# Patient Record
Sex: MALE | Race: BLACK OR AFRICAN AMERICAN | NOT HISPANIC OR LATINO | Employment: UNEMPLOYED | URBAN - METROPOLITAN AREA
[De-identification: names, ages, dates, MRNs, and addresses within clinical notes are randomized per-mention and may not be internally consistent; named-entity substitution may affect disease eponyms.]

---

## 2022-12-06 ENCOUNTER — HOSPITAL ENCOUNTER (EMERGENCY)
Facility: HOSPITAL | Age: 27
Discharge: HOME | End: 2022-12-07
Attending: FAMILY MEDICINE
Payer: MEDICAID

## 2022-12-06 DIAGNOSIS — B34.9 VIRAL ILLNESS: Primary | ICD-10-CM

## 2022-12-06 DIAGNOSIS — K13.79 OTHER LESIONS OF ORAL MUCOSA: ICD-10-CM

## 2022-12-06 DIAGNOSIS — R05.9 COUGH: ICD-10-CM

## 2022-12-06 PROCEDURE — 25000003 PHARM REV CODE 250: Performed by: PHYSICIAN ASSISTANT

## 2022-12-06 PROCEDURE — 0240U COVID/FLU A&B PCR: CPT | Performed by: PHYSICIAN ASSISTANT

## 2022-12-06 PROCEDURE — 99283 EMERGENCY DEPT VISIT LOW MDM: CPT | Mod: 25

## 2022-12-06 RX ORDER — ACETAMINOPHEN 500 MG
1000 TABLET ORAL
Status: COMPLETED | OUTPATIENT
Start: 2022-12-06 | End: 2022-12-06

## 2022-12-06 RX ADMIN — ACETAMINOPHEN 1000 MG: 500 TABLET ORAL at 11:12

## 2022-12-06 NOTE — Clinical Note
Blake Hassan accompanied their child to the emergency department on 12/6/2022. They may return to work on 12/08/2022.      If you have any questions or concerns, please don't hesitate to call.      DAVE Cuba

## 2022-12-07 VITALS
OXYGEN SATURATION: 97 % | SYSTOLIC BLOOD PRESSURE: 123 MMHG | BODY MASS INDEX: 15.81 KG/M2 | WEIGHT: 110.44 LBS | DIASTOLIC BLOOD PRESSURE: 75 MMHG | RESPIRATION RATE: 20 BRPM | HEART RATE: 102 BPM | HEIGHT: 70 IN | TEMPERATURE: 99 F

## 2022-12-07 LAB
FLUAV AG UPPER RESP QL IA.RAPID: NOT DETECTED
FLUBV AG UPPER RESP QL IA.RAPID: NOT DETECTED
SARS-COV-2 RNA RESP QL NAA+PROBE: NOT DETECTED

## 2022-12-07 PROCEDURE — 25000003 PHARM REV CODE 250: Performed by: PHYSICIAN ASSISTANT

## 2022-12-07 RX ORDER — SUCRALFATE 1 G/10ML
1 SUSPENSION ORAL 4 TIMES DAILY
Qty: 560 ML | Refills: 0 | Status: SHIPPED | OUTPATIENT
Start: 2022-12-07 | End: 2022-12-21

## 2022-12-07 RX ORDER — IBUPROFEN 400 MG/1
800 TABLET ORAL
Status: COMPLETED | OUTPATIENT
Start: 2022-12-07 | End: 2022-12-07

## 2022-12-07 RX ADMIN — IBUPROFEN 800 MG: 400 TABLET ORAL at 12:12

## 2022-12-07 NOTE — DISCHARGE INSTRUCTIONS
Take all medications as prescribed.     Use OTC Tylenol or ibuprofen for pain and/or fever.     Use Maalox/Mylanta and liquid benadryl for comfort swallowing.     Follow up with a PCP in 1 week.     Return to ER for any changes or worsening of symptoms.

## 2022-12-07 NOTE — ED PROVIDER NOTES
Encounter Date: 12/6/2022       History     Chief Complaint   Patient presents with    Pleurisy     Chest pain, fever, cough     28 YO AAM in ER with mother at bedside with complaints of cough, sore throat and now he is having pain with eating and drinking X several days. No known sick contacts. No PMHx and takes no meds. Denies chills, SOB, abdominal pain, N/V/D, HA or dizziness. No other complaints.     The history is provided by the patient.   Review of patient's allergies indicates:   Allergen Reactions    Latex, natural rubber      History reviewed. No pertinent past medical history.  History reviewed. No pertinent surgical history.  History reviewed. No pertinent family history.     Review of Systems   Constitutional:  Positive for fever. Negative for chills.   HENT:  Positive for sore throat. Negative for congestion and trouble swallowing (painful).    Respiratory:  Positive for cough. Negative for shortness of breath and wheezing.    Cardiovascular:  Negative for chest pain and leg swelling.   Gastrointestinal:  Negative for abdominal pain, diarrhea, nausea and vomiting.   Musculoskeletal:  Negative for joint swelling.   Skin:  Negative for rash.   Neurological:  Negative for dizziness, weakness and headaches.   All other systems reviewed and are negative.    Physical Exam     Initial Vitals [12/06/22 2249]   BP Pulse Resp Temp SpO2   119/74 (!) 123 20 99.9 °F (37.7 °C) 97 %      MAP       --         Physical Exam    Nursing note and vitals reviewed.  Constitutional: He appears well-developed and well-nourished.   HENT:   Head: Normocephalic and atraumatic.   Nose: Mucosal edema and rhinorrhea present. No epistaxis.   Mouth/Throat: Mucous membranes are normal. Oral lesions present.   Several oral lesions noted to soft palate, bilateral cerumen impactions   Eyes: Conjunctivae are normal.   Neck: Neck supple.   Cardiovascular:  Normal rate and regular rhythm.           Pulmonary/Chest: Breath sounds normal. He  has no wheezes. He has no rhonchi.   Musculoskeletal:         General: Normal range of motion.      Cervical back: Neck supple.     Neurological: He is alert and oriented to person, place, and time.   Skin: Skin is dry.   Psychiatric: He has a normal mood and affect.       ED Course   Procedures  Labs Reviewed   COVID/FLU A&B PCR - Normal    Narrative:     The Xpert Xpress SARS-CoV-2/FLU/RSV plus is a rapid, multiplexed real-time PCR test intended for the simultaneous qualitative detection and differentiation of SARS-CoV-2, Influenza A, Influenza B, and respiratory syncytial virus (RSV) viral RNA in either nasopharyngeal swab or nasal swab specimens.                Imaging Results              X-Ray Chest 1 View (Preliminary result)  Result time 12/06/22 23:46:45      ED Interpretation by DAVE Cuba (12/06/22 23:46:45, [facility], Emergency Medicine)    No acute cardiopulmonary findings                                     Medications   acetaminophen tablet 1,000 mg (1,000 mg Oral Given 12/6/22 2305)   ibuprofen tablet 800 mg (800 mg Oral Given 12/7/22 0038)                 ED Course as of 12/07/22 0139   Wed Dec 07, 2022   0018 VSS, NAD, pt is non-toxic or ill appearing, labs and imaging reviewed with pt, counseled pt on getting tested for HIV at the health unit, he verbalized understanding, will treat at viral illness at this time, treatment plan and discharge instructions including follow up discussed, pt verbalized understanding, all questions answered, pt is stable and ready for discharge  [TT]      ED Course User Index  [TT] DAVE Cuba                 Clinical Impression:   Final diagnoses:  [R05.9] Cough  [B34.9] Viral illness (Primary)  [K13.79] Other lesions of oral mucosa        ED Disposition Condition    Discharge Stable          ED Prescriptions       Medication Sig Dispense Start Date End Date Auth. Provider    sucralfate (CARAFATE) 100 mg/mL suspension Take 10 mLs  (1 g total) by mouth 4 (four) times daily. for 14 days 560 mL 12/7/2022 12/21/2022 DAVE Cuba          Follow-up Information       Follow up With Specialties Details Why Contact Info [facility] Emergency Medicine In 3 days As needed, If symptoms worsen [address] [facility] [phone]    Primary Care Provider  Schedule an appointment as soon as possible for a visit in 5 days  Call a PCP to make an appointment for follow up within 3-5  days.  You can all the phone number on the back of your insurance card for accepting providers as discussed.    [facility]  Go in 1 week for STD testing [address] [place] [address]   [phone]             DAVE Cuba  12/07/22 0139

## 2023-01-18 ENCOUNTER — HOSPITAL ENCOUNTER (INPATIENT)
Facility: HOSPITAL | Age: 28
LOS: 8 days | Discharge: HOME HEALTH | DRG: 975 | End: 2023-01-26
Attending: FAMILY MEDICINE | Admitting: FAMILY MEDICINE
Payer: MEDICAID

## 2023-01-18 DIAGNOSIS — J18.9 PNEUMONIA DUE TO INFECTIOUS ORGANISM, UNSPECIFIED LATERALITY, UNSPECIFIED PART OF LUNG: ICD-10-CM

## 2023-01-18 DIAGNOSIS — B20 SYMPTOMATIC HIV INFECTION: ICD-10-CM

## 2023-01-18 DIAGNOSIS — R07.9 CHEST PAIN: ICD-10-CM

## 2023-01-18 DIAGNOSIS — B20 HIV INFECTION, UNSPECIFIED SYMPTOM STATUS: Primary | ICD-10-CM

## 2023-01-18 DIAGNOSIS — D64.9 NORMOCYTIC ANEMIA: ICD-10-CM

## 2023-01-18 LAB
ABS NEUT CALC (OHS): 0.32 X10(3)/MCL (ref 2.1–9.2)
ALBUMIN SERPL-MCNC: 2.4 G/DL (ref 3.5–5)
ALBUMIN/GLOB SERPL: 0.4 RATIO (ref 1.1–2)
ALP SERPL-CCNC: 308 UNIT/L (ref 40–150)
ALT SERPL-CCNC: 20 UNIT/L (ref 0–55)
AMPHET UR QL SCN: NEGATIVE
ANISOCYTOSIS BLD QL SMEAR: SLIGHT
APPEARANCE UR: ABNORMAL
AST SERPL-CCNC: 28 UNIT/L (ref 5–34)
BACTERIA #/AREA URNS AUTO: ABNORMAL /HPF
BARBITURATE SCN PRESENT UR: NEGATIVE
BASOPHILS # BLD AUTO: 0 X10(3)/MCL (ref 0–0.2)
BASOPHILS NFR BLD AUTO: 0 %
BENZODIAZ UR QL SCN: NEGATIVE
BILIRUB UR QL STRIP.AUTO: ABNORMAL MG/DL
BILIRUBIN DIRECT+TOT PNL SERPL-MCNC: 0.9 MG/DL
BNP BLD-MCNC: 51.8 PG/ML
BUN SERPL-MCNC: 12.7 MG/DL (ref 8.9–20.6)
BURR CELLS (OLG): ABNORMAL
CALCIUM SERPL-MCNC: 8.6 MG/DL (ref 8.4–10.2)
CANNABINOIDS UR QL SCN: POSITIVE
CHLORIDE SERPL-SCNC: 96 MMOL/L (ref 98–107)
CO2 SERPL-SCNC: 21 MMOL/L (ref 22–29)
COCAINE UR QL SCN: NEGATIVE
COLOR UR AUTO: ABNORMAL
CREAT SERPL-MCNC: 1.01 MG/DL (ref 0.73–1.18)
CRYPTOC AG SER QL IA.RAPID: NEGATIVE
CRYPTOC AG TITR CSF IA: NORMAL {TITER}
EOSINOPHIL # BLD AUTO: 0 X10(3)/MCL (ref 0–0.9)
EOSINOPHIL NFR BLD AUTO: 0 %
ERYTHROCYTE [DISTWIDTH] IN BLOOD BY AUTOMATED COUNT: 15.6 % (ref 11.5–17)
ERYTHROCYTE [DISTWIDTH] IN BLOOD BY AUTOMATED COUNT: 15.6 % (ref 11.5–17)
EST. AVERAGE GLUCOSE BLD GHB EST-MCNC: 111.2 MG/DL
FENTANYL UR QL SCN: NEGATIVE
FERRITIN SERPL-MCNC: 2325.58 NG/ML (ref 21.81–274.66)
FLUAV AG UPPER RESP QL IA.RAPID: NOT DETECTED
FLUBV AG UPPER RESP QL IA.RAPID: NOT DETECTED
GFR SERPLBLD CREATININE-BSD FMLA CKD-EPI: >60 MLS/MIN/1.73/M2
GLOBULIN SER-MCNC: 6.3 GM/DL (ref 2.4–3.5)
GLUCOSE SERPL-MCNC: 148 MG/DL (ref 74–100)
GLUCOSE UR QL STRIP.AUTO: NORMAL MG/DL
GRAN CASTS #/AREA URNS LPF: >20 /LPF
HAV IGM SERPL QL IA: NONREACTIVE
HBA1C MFR BLD: 5.5 %
HBV CORE IGM SERPL QL IA: NONREACTIVE
HBV SURFACE AG SERPL QL IA: NONREACTIVE
HCT VFR BLD AUTO: 22.9 % (ref 42–52)
HCT VFR BLD AUTO: 26.2 % (ref 42–52)
HCV AB SERPL QL IA: NONREACTIVE
HGB BLD-MCNC: 7.2 GM/DL (ref 14–18)
HGB BLD-MCNC: 8.3 GM/DL (ref 14–18)
HIV 1+2 AB+HIV1 P24 AG SERPL QL IA: REACTIVE
HYALINE CASTS #/AREA URNS LPF: >20 /LPF
HYPOCHROMIA BLD QL SMEAR: ABNORMAL
IMM GRANULOCYTES # BLD AUTO: 0.59 X10(3)/MCL (ref 0–0.04)
IMM GRANULOCYTES # BLD AUTO: 0.65 X10(3)/MCL (ref 0–0.04)
IMM GRANULOCYTES NFR BLD AUTO: 48.9 %
IMM GRANULOCYTES NFR BLD AUTO: 51.8 %
IRON SATN MFR SERPL: 6 % (ref 20–50)
IRON SERPL-MCNC: 7 UG/DL (ref 65–175)
KETONES UR QL STRIP.AUTO: NEGATIVE MG/DL
LEUKOCYTE ESTERASE UR QL STRIP.AUTO: NEGATIVE UNIT/L
LYMPHOCYTES # BLD AUTO: 0.25 X10(3)/MCL (ref 0.6–4.6)
LYMPHOCYTES NFR BLD AUTO: 18.8 %
LYMPHOCYTES NFR BLD MANUAL: 0.44 X10(3)/MCL
LYMPHOCYTES NFR BLD MANUAL: 40 % (ref 13–40)
MACROCYTES BLD QL SMEAR: SLIGHT
MAGNESIUM SERPL-MCNC: 1.8 MG/DL (ref 1.6–2.6)
MAGNESIUM SERPL-MCNC: 2 MG/DL (ref 1.6–2.6)
MCH RBC QN AUTO: 25.3 PG
MCH RBC QN AUTO: 25.5 PG
MCHC RBC AUTO-ENTMCNC: 31.4 MG/DL (ref 33–36)
MCHC RBC AUTO-ENTMCNC: 31.7 MG/DL (ref 33–36)
MCV RBC AUTO: 80.4 FL (ref 80–94)
MCV RBC AUTO: 80.4 FL (ref 80–94)
MDMA UR QL SCN: NEGATIVE
METAMYELOCYTES NFR BLD MANUAL: 7 %
MONOCYTES # BLD AUTO: 0.34 X10(3)/MCL (ref 0.1–1.3)
MONOCYTES NFR BLD AUTO: 25.6 %
MONOCYTES NFR BLD MANUAL: 0.34 X10(3)/MCL (ref 0.1–1.3)
MONOCYTES NFR BLD MANUAL: 31 % (ref 2–11)
MUCOUS THREADS URNS QL MICRO: ABNORMAL /LPF
MYELOCYTES NFR BLD MANUAL: 6 %
NEUTROPHILS # BLD AUTO: 0.09 X10(3)/MCL (ref 2.1–9.2)
NEUTROPHILS NFR BLD AUTO: 6.7 %
NEUTROPHILS NFR BLD MANUAL: 16 % (ref 47–80)
NITRITE UR QL STRIP.AUTO: NEGATIVE
NRBC BLD AUTO-RTO: 0 %
NRBC BLD AUTO-RTO: 0 %
OPIATES UR QL SCN: NEGATIVE
OSMOLALITY SERPL: 272 MOSM/KG (ref 280–300)
OSMOLALITY UR: 657 MOSM/KG (ref 300–1300)
OVALOCYTES (OLG): ABNORMAL
PCP UR QL: NEGATIVE
PH UR STRIP.AUTO: 6 [PH]
PH UR: 6 [PH] (ref 3–11)
PHOSPHATE SERPL-MCNC: 2.2 MG/DL (ref 2.3–4.7)
PHOSPHATE SERPL-MCNC: 3.2 MG/DL (ref 2.3–4.7)
PLATELET # BLD AUTO: 220 X10(3)/MCL (ref 130–400)
PLATELET # BLD AUTO: 268 X10(3)/MCL (ref 130–400)
PLATELET # BLD EST: NORMAL 10*3/UL
PMV BLD AUTO: 11.8 FL (ref 7.4–10.4)
PMV BLD AUTO: 12.8 FL (ref 7.4–10.4)
POIKILOCYTOSIS BLD QL SMEAR: ABNORMAL
POTASSIUM SERPL-SCNC: 4.6 MMOL/L (ref 3.5–5.1)
PROT SERPL-MCNC: 8.7 GM/DL (ref 6.4–8.3)
PROT UR QL STRIP.AUTO: ABNORMAL MG/DL
RBC # BLD AUTO: 2.85 X10(6)/MCL (ref 4.7–6.1)
RBC # BLD AUTO: 3.26 X10(6)/MCL (ref 4.7–6.1)
RBC #/AREA URNS AUTO: ABNORMAL /HPF
RBC MORPH BLD: ABNORMAL
RBC UR QL AUTO: ABNORMAL UNIT/L
RSV A 5' UTR RNA NPH QL NAA+PROBE: NOT DETECTED
SARS-COV-2 RNA RESP QL NAA+PROBE: NOT DETECTED
SODIUM SERPL-SCNC: 131 MMOL/L (ref 136–145)
SODIUM UR-SCNC: <20 MMOL/L
SP GR UR STRIP.AUTO: 1.03
SPECIFIC GRAVITY, URINE AUTO (.000) (OHS): 1.03 (ref 1–1.03)
SQUAMOUS #/AREA URNS LPF: ABNORMAL /HPF
T PALLIDUM AB SER QL: REACTIVE
TARGETS BLD QL SMEAR: ABNORMAL
TEAR DROP CELL (OLG): ABNORMAL
TIBC SERPL-MCNC: 118 UG/DL (ref 69–240)
TIBC SERPL-MCNC: 125 UG/DL (ref 250–450)
TRANSFERRIN SERPL-MCNC: 109 MG/DL (ref 174–364)
TROPONIN I SERPL-MCNC: <0.01 NG/ML (ref 0–0.04)
TSH SERPL-ACNC: 1.44 UIU/ML (ref 0.35–4.94)
TSH SERPL-ACNC: 1.47 UIU/ML (ref 0.35–4.94)
UROBILINOGEN UR STRIP-ACNC: 4 MG/DL
WBC # SPEC AUTO: 1.1 X10(3)/MCL (ref 4.5–11.5)
WBC # SPEC AUTO: 1.3 X10(3)/MCL (ref 4.5–11.5)
WBC #/AREA URNS AUTO: ABNORMAL /HPF

## 2023-01-18 PROCEDURE — 80053 COMPREHEN METABOLIC PANEL: CPT | Performed by: PHYSICIAN ASSISTANT

## 2023-01-18 PROCEDURE — 81001 URINALYSIS AUTO W/SCOPE: CPT | Performed by: PHYSICIAN ASSISTANT

## 2023-01-18 PROCEDURE — 87184 SC STD DISK METHOD PER PLATE: CPT

## 2023-01-18 PROCEDURE — 63600175 PHARM REV CODE 636 W HCPCS: Performed by: FAMILY MEDICINE

## 2023-01-18 PROCEDURE — 93005 ELECTROCARDIOGRAM TRACING: CPT

## 2023-01-18 PROCEDURE — 86780 TREPONEMA PALLIDUM: CPT

## 2023-01-18 PROCEDURE — 25500020 PHARM REV CODE 255: Performed by: FAMILY MEDICINE

## 2023-01-18 PROCEDURE — 11000001 HC ACUTE MED/SURG PRIVATE ROOM

## 2023-01-18 PROCEDURE — 83735 ASSAY OF MAGNESIUM: CPT

## 2023-01-18 PROCEDURE — 87536 HIV-1 QUANT&REVRSE TRNSCRPJ: CPT

## 2023-01-18 PROCEDURE — 86702 HIV-2 ANTIBODY: CPT | Performed by: PHYSICIAN ASSISTANT

## 2023-01-18 PROCEDURE — 83550 IRON BINDING TEST: CPT

## 2023-01-18 PROCEDURE — 0241U COVID/RSV/FLU A&B PCR: CPT

## 2023-01-18 PROCEDURE — 86920 COMPATIBILITY TEST SPIN: CPT

## 2023-01-18 PROCEDURE — 94761 N-INVAS EAR/PLS OXIMETRY MLT: CPT

## 2023-01-18 PROCEDURE — 86900 BLOOD TYPING SEROLOGIC ABO: CPT

## 2023-01-18 PROCEDURE — 96361 HYDRATE IV INFUSION ADD-ON: CPT

## 2023-01-18 PROCEDURE — 83935 ASSAY OF URINE OSMOLALITY: CPT

## 2023-01-18 PROCEDURE — 85027 COMPLETE CBC AUTOMATED: CPT | Performed by: PHYSICIAN ASSISTANT

## 2023-01-18 PROCEDURE — 63600175 PHARM REV CODE 636 W HCPCS

## 2023-01-18 PROCEDURE — S0039 INJECTION, SULFAMETHOXAZOLE: HCPCS

## 2023-01-18 PROCEDURE — 84443 ASSAY THYROID STIM HORMONE: CPT

## 2023-01-18 PROCEDURE — 25000003 PHARM REV CODE 250: Performed by: FAMILY MEDICINE

## 2023-01-18 PROCEDURE — 25000003 PHARM REV CODE 250: Performed by: PHYSICIAN ASSISTANT

## 2023-01-18 PROCEDURE — 84100 ASSAY OF PHOSPHORUS: CPT

## 2023-01-18 PROCEDURE — 84300 ASSAY OF URINE SODIUM: CPT

## 2023-01-18 PROCEDURE — 87040 BLOOD CULTURE FOR BACTERIA: CPT | Performed by: FAMILY MEDICINE

## 2023-01-18 PROCEDURE — 85060 BLOOD SMEAR INTERPRETATION: CPT | Performed by: INTERNAL MEDICINE

## 2023-01-18 PROCEDURE — 84443 ASSAY THYROID STIM HORMONE: CPT | Performed by: PHYSICIAN ASSISTANT

## 2023-01-18 PROCEDURE — 80074 ACUTE HEPATITIS PANEL: CPT

## 2023-01-18 PROCEDURE — 25000003 PHARM REV CODE 250

## 2023-01-18 PROCEDURE — 87088 URINE BACTERIA CULTURE: CPT | Performed by: PHYSICIAN ASSISTANT

## 2023-01-18 PROCEDURE — 96374 THER/PROPH/DIAG INJ IV PUSH: CPT

## 2023-01-18 PROCEDURE — 80307 DRUG TEST PRSMV CHEM ANLYZR: CPT

## 2023-01-18 PROCEDURE — 86701 HIV-1ANTIBODY: CPT | Performed by: PHYSICIAN ASSISTANT

## 2023-01-18 PROCEDURE — 83930 ASSAY OF BLOOD OSMOLALITY: CPT

## 2023-01-18 PROCEDURE — 85027 COMPLETE CBC AUTOMATED: CPT

## 2023-01-18 PROCEDURE — 87449 NOS EACH ORGANISM AG IA: CPT

## 2023-01-18 PROCEDURE — 84484 ASSAY OF TROPONIN QUANT: CPT | Performed by: PHYSICIAN ASSISTANT

## 2023-01-18 PROCEDURE — 87389 HIV-1 AG W/HIV-1&-2 AB AG IA: CPT | Performed by: PHYSICIAN ASSISTANT

## 2023-01-18 PROCEDURE — 83036 HEMOGLOBIN GLYCOSYLATED A1C: CPT

## 2023-01-18 PROCEDURE — 87899 AGENT NOS ASSAY W/OPTIC: CPT

## 2023-01-18 PROCEDURE — 99285 EMERGENCY DEPT VISIT HI MDM: CPT | Mod: 25

## 2023-01-18 PROCEDURE — 83735 ASSAY OF MAGNESIUM: CPT | Performed by: PHYSICIAN ASSISTANT

## 2023-01-18 PROCEDURE — 86592 SYPHILIS TEST NON-TREP QUAL: CPT

## 2023-01-18 PROCEDURE — 85025 COMPLETE CBC W/AUTO DIFF WBC: CPT | Performed by: PHYSICIAN ASSISTANT

## 2023-01-18 PROCEDURE — 83880 ASSAY OF NATRIURETIC PEPTIDE: CPT | Performed by: PHYSICIAN ASSISTANT

## 2023-01-18 PROCEDURE — 87070 CULTURE OTHR SPECIMN AEROBIC: CPT

## 2023-01-18 PROCEDURE — 36415 COLL VENOUS BLD VENIPUNCTURE: CPT

## 2023-01-18 PROCEDURE — 86713 LEGIONELLA ANTIBODY: CPT

## 2023-01-18 PROCEDURE — 84100 ASSAY OF PHOSPHORUS: CPT | Performed by: PHYSICIAN ASSISTANT

## 2023-01-18 PROCEDURE — 86361 T CELL ABSOLUTE COUNT: CPT

## 2023-01-18 PROCEDURE — 82728 ASSAY OF FERRITIN: CPT

## 2023-01-18 PROCEDURE — 27000207 HC ISOLATION

## 2023-01-18 RX ORDER — ACETAMINOPHEN 500 MG
1000 TABLET ORAL
Status: DISPENSED | OUTPATIENT
Start: 2023-01-18 | End: 2023-01-19

## 2023-01-18 RX ORDER — NYSTATIN 100000 [USP'U]/ML
500000 SUSPENSION ORAL 4 TIMES DAILY
Status: DISCONTINUED | OUTPATIENT
Start: 2023-01-18 | End: 2023-01-20

## 2023-01-18 RX ORDER — SODIUM CHLORIDE 9 MG/ML
1000 INJECTION, SOLUTION INTRAVENOUS
Status: COMPLETED | OUTPATIENT
Start: 2023-01-18 | End: 2023-01-18

## 2023-01-18 RX ORDER — IBUPROFEN 200 MG
24 TABLET ORAL
Status: DISCONTINUED | OUTPATIENT
Start: 2023-01-18 | End: 2023-01-26 | Stop reason: HOSPADM

## 2023-01-18 RX ORDER — SODIUM CHLORIDE, SODIUM LACTATE, POTASSIUM CHLORIDE, CALCIUM CHLORIDE 600; 310; 30; 20 MG/100ML; MG/100ML; MG/100ML; MG/100ML
INJECTION, SOLUTION INTRAVENOUS CONTINUOUS
Status: DISCONTINUED | OUTPATIENT
Start: 2023-01-18 | End: 2023-01-19

## 2023-01-18 RX ORDER — IBUPROFEN 200 MG
16 TABLET ORAL
Status: DISCONTINUED | OUTPATIENT
Start: 2023-01-18 | End: 2023-01-26 | Stop reason: HOSPADM

## 2023-01-18 RX ORDER — SODIUM CHLORIDE 0.9 % (FLUSH) 0.9 %
10 SYRINGE (ML) INJECTION EVERY 12 HOURS PRN
Status: DISCONTINUED | OUTPATIENT
Start: 2023-01-18 | End: 2023-01-26 | Stop reason: HOSPADM

## 2023-01-18 RX ORDER — GLUCAGON 1 MG
1 KIT INJECTION
Status: DISCONTINUED | OUTPATIENT
Start: 2023-01-18 | End: 2023-01-26 | Stop reason: HOSPADM

## 2023-01-18 RX ORDER — ACETAMINOPHEN 500 MG
1000 TABLET ORAL
Status: COMPLETED | OUTPATIENT
Start: 2023-01-18 | End: 2023-01-18

## 2023-01-18 RX ORDER — SULFAMETHOXAZOLE AND TRIMETHOPRIM 800; 160 MG/1; MG/1
2 TABLET ORAL 2 TIMES DAILY
Status: DISCONTINUED | OUTPATIENT
Start: 2023-01-18 | End: 2023-01-18

## 2023-01-18 RX ORDER — ACETAMINOPHEN 325 MG/1
650 TABLET ORAL EVERY 6 HOURS PRN
Status: DISCONTINUED | OUTPATIENT
Start: 2023-01-18 | End: 2023-01-26 | Stop reason: HOSPADM

## 2023-01-18 RX ORDER — NALOXONE HCL 0.4 MG/ML
0.02 VIAL (ML) INJECTION
Status: DISCONTINUED | OUTPATIENT
Start: 2023-01-18 | End: 2023-01-26 | Stop reason: HOSPADM

## 2023-01-18 RX ORDER — ENOXAPARIN SODIUM 100 MG/ML
40 INJECTION SUBCUTANEOUS EVERY 24 HOURS
Status: DISCONTINUED | OUTPATIENT
Start: 2023-01-18 | End: 2023-01-23

## 2023-01-18 RX ADMIN — VANCOMYCIN HYDROCHLORIDE 1250 MG: 1 INJECTION, POWDER, LYOPHILIZED, FOR SOLUTION INTRAVENOUS at 03:01

## 2023-01-18 RX ADMIN — SODIUM CHLORIDE, POTASSIUM CHLORIDE, SODIUM LACTATE AND CALCIUM CHLORIDE: 600; 310; 30; 20 INJECTION, SOLUTION INTRAVENOUS at 06:01

## 2023-01-18 RX ADMIN — SODIUM PHOSPHATE, MONOBASIC, MONOHYDRATE AND SODIUM PHOSPHATE, DIBASIC, ANHYDROUS 20.01 MMOL: 142; 276 INJECTION, SOLUTION INTRAVENOUS at 07:01

## 2023-01-18 RX ADMIN — ACETAMINOPHEN 1000 MG: 500 TABLET ORAL at 12:01

## 2023-01-18 RX ADMIN — SULFAMETHOXAZOLE AND TRIMETHOPRIM 333.44 MG: 80; 16 INJECTION INTRAVENOUS at 05:01

## 2023-01-18 RX ADMIN — SODIUM CHLORIDE 1000 ML: 9 INJECTION, SOLUTION INTRAVENOUS at 02:01

## 2023-01-18 RX ADMIN — PIPERACILLIN AND TAZOBACTAM 4.5 G: 4; .5 INJECTION, POWDER, LYOPHILIZED, FOR SOLUTION INTRAVENOUS; PARENTERAL at 11:01

## 2023-01-18 RX ADMIN — PIPERACILLIN AND TAZOBACTAM 4.5 G: 4; .5 INJECTION, POWDER, LYOPHILIZED, FOR SOLUTION INTRAVENOUS; PARENTERAL at 03:01

## 2023-01-18 RX ADMIN — SODIUM CHLORIDE 1500 ML: 9 INJECTION, SOLUTION INTRAVENOUS at 12:01

## 2023-01-18 RX ADMIN — NYSTATIN 500000 UNITS: 100000 SUSPENSION ORAL at 11:01

## 2023-01-18 RX ADMIN — ENOXAPARIN SODIUM 40 MG: 40 INJECTION SUBCUTANEOUS at 05:01

## 2023-01-18 RX ADMIN — IOPAMIDOL 100 ML: 755 INJECTION, SOLUTION INTRAVENOUS at 02:01

## 2023-01-18 RX ADMIN — NYSTATIN 500000 UNITS: 100000 SUSPENSION ORAL at 05:01

## 2023-01-18 NOTE — ED PROVIDER NOTES
Name: Hanh Hassan   Age: 27 y.o.  Sex: male    Chief complaint:   Chief Complaint   Patient presents with    Fatigue    Weight Loss     Pt reports generalized weakness, fatigue, and weight loss over the past month.       Patient arrived with: Private  History obtained from: Patient    Subjective:   27-year-old male with no previous medical history that presents to emergency department for generalized weakness and fatigue.  Patient said his symptoms have been going on for approximately 1 month, they have not improved which is why he came into the emergency department today.  Complains of generalized weakness and fatigue.  Denies any falls or trauma.  Complains of subjective fevers and sweats.  Has intermittent cough but no current cough.  Denies chest pain or shortness of breath.  Complains of intermittent nausea.  Denies abdominal pain, vomiting, diarrhea, dysuria, hematuria.    History reviewed. No pertinent past medical history.  History reviewed. No pertinent surgical history.  Social History     Socioeconomic History    Marital status: Single   Tobacco Use    Smoking status: Never    Smokeless tobacco: Never   Substance and Sexual Activity    Drug use: Never    Sexual activity: Not Currently   Social History Narrative    ** Merged History Encounter **          Review of patient's allergies indicates:   Allergen Reactions    Latex      Other reaction(s): rash    Latex, natural rubber         Review of Systems   Constitutional:  Positive for malaise/fatigue. Negative for diaphoresis and fever.   HENT:  Negative for congestion and sore throat.    Eyes:  Negative for pain and discharge.   Respiratory:  Negative for cough and shortness of breath.    Cardiovascular:  Negative for chest pain and palpitations.   Gastrointestinal:  Negative for diarrhea and vomiting.   Genitourinary:  Negative for dysuria and hematuria.   Musculoskeletal:  Negative for back pain and myalgias.   Skin:  Negative for itching and rash.    Neurological:  Positive for weakness. Negative for headaches.        Objective:     Vitals:    01/18/23 1412   BP: 112/70   Pulse: (!) 140   Resp: 15   Temp:         Physical Exam  Constitutional:       Appearance: He is underweight. He is not toxic-appearing.   HENT:      Head: Normocephalic and atraumatic.   Cardiovascular:      Rate and Rhythm: Regular rhythm.      Pulses: Normal pulses.   Pulmonary:      Effort: Pulmonary effort is normal.      Breath sounds: Normal breath sounds.   Abdominal:      General: Abdomen is flat. Bowel sounds are normal.      Palpations: Abdomen is soft.   Musculoskeletal:         General: No deformity. Normal range of motion.      Cervical back: Normal range of motion and neck supple.   Skin:     General: Skin is warm and dry.   Neurological:      General: No focal deficit present.      Mental Status: He is alert and oriented to person, place, and time.   Psychiatric:         Mood and Affect: Mood normal.         Behavior: Behavior normal.        Records:  Nursing records and triage records reviewed  Prior records reviewed    Medical decision making:   Presents to emergency department for generalized weakness and fatigue.  Patient is nontoxic appearing.  Febrile 39.3, will receive acetaminophen.  Tachycardic 168.  Normotensive, saturating well on room air.  Physical exam within normal limits.  Patient triggered SIRS criteria, will order lactic acid.  Patient received 30 cc/kg of IV fluids.    ED Course as of 01/18/23 1453   Wed Jan 18, 2023   1218 Nurse dropped the Tylenol by mistake.  Will reorder.    EKG shows sinus tachycardia, no signs of ischemia [RK]   1244 Leukopenia with a glucose of 1.3.  Anemia with hemoglobin 8.3.  No thrombocytopenia.  Patient has mild electrolyte abnormalities but no severe changes.  No AYDEN hyperglycemia.  Alk-phos elevated at 308, otherwise liver enzymes are within normal limits.  Phosphorus 2.2, magnesium normal. [RK]   1250 Troponin negative [RK]    1330 UA shows no obvious signs of urinary tract infection.  Does have occult blood.  With right CVA tenderness will get CT abdomen pelvis for further evaluation. [RK]   1356 My interpretation of chest x-ray shows signs of pneumonia in the right lower lobe, will wait for CT abd/pelvis prior to starting abx [RK]   1453 CT abdomen pelvis shows no acute intra-abdominal process, confirms pneumonia.  Patient will be started on antibiotics.  Will be admitted to the hospital for further management.  Discussed with internal medicine team. [RK]      ED Course User Index  [RK] Delfino Mishra MD          EKG:  ECG Results              EKG 12-lead (Chest Pain) Age >30 (Preliminary result)  Result time 01/18/23 12:18:37      ED Interpretation by Delfino Mishra MD (01/18/23 12:18:37, [facility], Emergency Medicine)    Sinus tachycardia rate of 168, no signs of ST elevation or depression, right axis deviation, normal intervals with a QTC of 484                      In process by Interface, Lab In [facility] (01/18/23 11:41:35)                   Narrative:    Test Reason : R07.9,    Vent. Rate : 168 BPM     Atrial Rate : 166 BPM     P-R Int : 000 ms          QRS Dur : 066 ms      QT Int : 290 ms       P-R-T Axes : 000 090 049 degrees     QTc Int : 484 ms    Supraventricular tachycardia  Rightward axis  T wave abnormality, consider anterolateral ischemia  Abnormal ECG  No previous ECGs available    Referred By:             Confirmed By:                                      Critical Care    Date/Time: 1/18/2023 2:53 PM  Performed by: Delfino Mishra MD  Authorized by: Delfino Mishra MD   Total critical care time (exclusive of procedural time) : 35 minutes  Comments: Upon my evaluation, this patient had a high probability of imminent or life-threatening deterioration due to SIRS, which required my direct attention, intervention, and personal management.    I have personally provided 35  minutes of critical care time exclusive of time spent on separately billed procedures. Time includes review of chart, history and physical exam of the patient, review of laboratory data, review of radiology results, discussion with consultants, and monitoring for potential decompensation. Interventions were performed as documented above.              Diagnosis:  Final diagnoses:  [R07.9] Chest pain  [B20] HIV infection, unspecified symptom status (Primary)  [J18.9] Pneumonia due to infectious organism, unspecified laterality, unspecified part of lung          Delfino Mishra M.D.  Emergency Medicine Physician     (Please note that this chart was completed via voice to text dictation. There may be typographical errors or substitutions that are unintentional, or uncorrected. Every attempt was made to proofread the chart prior to completion. If there are any questions, please contact the physician for final clarification).         Delfino Mishra MD  01/18/23 7417

## 2023-01-18 NOTE — H&P
[facility] Medicine Wards History & Physical Note     Resident Team: [facility] Medicine List 1  Attending Physician: Evelyn Kc MD  Resident: Will   Intern: Hema      Date of Admit: 1/18/2023    Chief Complaint     Fatigue and Weight Loss (Pt reports generalized weakness, fatigue, and weight loss over the past month. )   for 1 month     Subjective:      History of Present Illness:  Hanh Hassan is a 27 y.o. male with no significant medical history who presented to [facility] ED on 1/18/2023  for blood work following recent ED visit in December.  In December, patient was seen in [facility] ED for sore throat, cough, and pain when swallowing solids and liquids.  He was also noted to have several oral lesions on soft palate. At that time, he was treated as viral illness with supportive care and discharged home  from ED with Carafate.  He was also advised to get tested for HIV at the health unit, which he was unable to do so. During this time, he started experiencing weight loss, decreased appetite, nausea, vomiting, fatigue, and night sweats.  Within the last 2 weeks, patient reports fevers, rhinorrhea, and cough productive of white sputum.  Patient denies hemoptysis, chest pain, palpitations, abdominal pain, shortness of breath.  He denies constipation or diarrhea however reports occasional mucus and bright red blood in stools.  He also reports recent bilateral lower extremity weakness requiring assistance ambulating.     His last sexual encounter was in July 2022.  He has multiple sexual partners (men) and he is the receptive partner.  He denies any dysuria, urinary frequency, discharge, lesions, or rash.  He denies tobacco use, current alcohol use, or drug use.  He does not take any daily medications and he does not have a PCP.     In the ED, he is febrile, normotensive, tachycardic (-168), and satting 97% on room air.  Lab significant for leukopenia WBC 1.3, normocytic anemia, hyponatremia, hypophosphatemia,  "elevated ALP, and positive HIV.  Chest x-ray revealed patchy bilateral lung infiltrates, with dense consolidations in right middle lobe.  In the ED, he was given 1.5 L of normal saline, Zosyn, and Vancomycin.  Internal Medicine was consulted for new HIV diagnosis and pneumonia.    Past Medical History:  History reviewed. No pertinent past medical history.    Past Surgical History:  History reviewed. No pertinent surgical history.    Family History:  History reviewed. No pertinent family history.    Social History:  Social History     Tobacco Use    Smoking status: Never    Smokeless tobacco: Never   Substance Use Topics    Drug use: Never       Allergies:  Review of patient's allergies indicates:   Allergen Reactions    Latex      Other reaction(s): rash    Latex, natural rubber        Home Medications:  Prior to Admission medications    Not on File         Review of Systems:  Constitutional: fever, fatigue, weakness  Eye: no vision loss, eye redness, drainage, or pain  ENMT: rhinorrhea sore throat, no ear pain, no sinus pain/congestion  Respiratory: cough, no wheezing, no shortness of breath  Cardiovascular: no chest pain, no palpitations, no edema  Gastrointestinal: nausea, vomiting, no diarrhea. No abdominal pain  Genitourinary: no dysuria, no urinary frequency or urgency, no hematuria  Hema/Lymph: no abnormal bruising or bleeding  Endocrine: no heat or cold intolerance, no excessive thirst or excessive urination  Musculoskeletal: no muscle or joint pain, no joint swelling  Integumentary: no skin rash or abnormal lesion  Neurologic: weakness, no headache, no dizziness           Objective:   Last 24 Hour Vital Signs:  BP  Min: 112/70  Max: 142/98  Temp  Av.3 °F (38.5 °C)  Min: 98.2 °F (36.8 °C)  Max: 103.1 °F (39.5 °C)  Pulse  Av.2  Min: 138  Max: 168  Resp  Av.8  Min: 15  Max: 29  SpO2  Av.8 %  Min: 97 %  Max: 100 %  Height  Av' 6.14" (168 cm)  Min: 5' 6.14" (168 cm)  Max: 5' 6.14" (168 " cm)  Weight  Av kg (110 lb 3.7 oz)  Min: 50 kg (110 lb 3.7 oz)  Max: 50 kg (110 lb 3.7 oz)  Body mass index is 17.72 kg/m².  No intake/output data recorded.    Physical Examination:  General: Patient resting comfortably in bed, ill-appearing, cachectic   Eye: PERRLA, EOMI, clear conjunctiva, eyelids normal  HENT: Head-normocephalic and atraumatic. Oral well defined white plaques located on soft palate.   Neck: full range of motion, no thyromegaly or lymphadenopathy, trachea midline, supple, no palpable thyroid nodules  Respiratory: crackles in bilateral lung bases, without wheezing, rales, rhonchi   Cardiovascular: tachycardic and regular rhythm without murmurs.  No gallops or rubs no JVD.  Capillary refill within normal limits.  Gastrointestinal: soft, non-tender, non-distended with normal bowel sounds, without masses to palpation  Genitourinary: no CVA tenderness to palpation  Musculoskeletal: full range of motion of all extremities/spine without limitation or discomfort  Integumentary: hypopigmented patches located on face/scalp. Diffuse non-scarring hair loss.   Neurologic: no signs of peripheral neurological deficit, motor/sensory function intact  Psychiatric:  alert and oriented, cognitive function intact, cooperative with exam, good eye contact, judgement and insight altered, mood and affect flat.      Laboratory:  Most Recent Data:  CBC:   Lab Results   Component Value Date    WBC 1.3 (LL) 2023    HGB 8.3 (L) 2023    HCT 26.2 (L) 2023     2023    MCV 80.4 2023    RDW 15.6 2023     WBC Differential:   Recent Labs   Lab 23  1216   WBC 1.3*   HGB 8.3*   HCT 26.2*      MCV 80.4     BMP:   Lab Results   Component Value Date     (L) 2023    K 4.6 2023    CO2 21 (L) 2023    BUN 12.7 2023    CREATININE 1.01 2023    CALCIUM 8.6 2023    MG 2.00 2023    PHOS 2.2 (L) 2023     LFTs:   Lab Results   Component  Value Date    ALBUMIN 2.4 (L) 01/18/2023    BILITOT 0.9 01/18/2023    AST 28 01/18/2023    ALKPHOS 308 (H) 01/18/2023    ALT 20 01/18/2023     Coags: No results found for: INR, PROTIME, PTT  FLP: No results found for: CHOL, HDL, LDLCALC, TRIG, CHOLHDL  DM:   Lab Results   Component Value Date    CREATININE 1.01 01/18/2023     Thyroid:   Lab Results   Component Value Date    TSH 1.465 01/18/2023      Anemia: No results found for: IRON, TIBC, FERRITIN, SATURATEDIRO  No results found for: [id number]  No results found for: FOLATE     Cardiac:   Lab Results   Component Value Date    TROPONINI <0.010 01/18/2023    BNP 51.8 01/18/2023     Urinalysis:   Lab Results   Component Value Date    PHUA 6.0 01/18/2023    UROBILINOGEN +4 (A) 01/18/2023    WBCUA 11-20 (A) 01/18/2023       Trended Lab Data:  Recent Labs   Lab 01/18/23  1216   WBC 1.3*   HGB 8.3*   HCT 26.2*      MCV 80.4   RDW 15.6   *   K 4.6   CO2 21*   BUN 12.7   CREATININE 1.01   ALBUMIN 2.4*   BILITOT 0.9   AST 28   ALKPHOS 308*   ALT 20       Trended Cardiac Data:  Recent Labs   Lab 01/18/23  1216   TROPONINI <0.010   BNP 51.8       Microbiology Data:  Microbiology Results (last 7 days)       Procedure Component Value Units Date/Time    Respiratory Culture [[id number]] Collected: 01/18/23 1608    Order Status: Sent Specimen: Sputum, Expectorated Updated: 01/18/23 1627    Blood Culture (site 1) [[id number]]     Order Status: Sent Specimen: Blood     Blood Culture (site 2) [[id number]]     Order Status: Sent Specimen: Blood     Stool Culture [[id number]]     Order Status: Sent Specimen: Stool     Blood Culture [[id number]] Collected: 01/18/23 1425    Order Status: Sent Specimen: Blood from Forearm, Right Updated: 01/18/23 1427    Blood Culture [[id number]] Collected: 01/18/23 1402    Order Status: Sent Specimen: Blood from Arm, Right Updated: 01/18/23 1403    Urine culture [[id number]] Collected: 01/18/23 1232    Order Status: Sent Specimen: Urine Updated:  01/18/23 1314             Other Results:  EKG (my interpretation): sinus tachycardia     Radiology:  Imaging Results              CT Abdomen Pelvis With Contrast (Final result)  Result time 01/18/23 14:34:11      Final result by Amairnai Cruz MD (01/18/23 14:34:11)                   Impression:      1. Findings concerning for multifocal pneumonia in the lung bases.  2. No acute abnormality of the abdomen or pelvis.      Electronically signed by: Amairani Cruz  Date:    01/18/2023  Time:    14:34               Narrative:    EXAMINATION:  CT ABDOMEN PELVIS WITH CONTRAST    CLINICAL HISTORY:  Flank pain, kidney stone suspected;    TECHNIQUE:  CT imaging was performed of the abdomen and pelvis after the administration of intravenous contrast. Dose length product is 157 mGycm. Automatic exposure control, adjustment of mA/kV or iterative reconstruction technique was used to limit radiation dose.    COMPARISON:  None    FINDINGS:  Liver: Normal.    Gallbladder and biliary tree: No calcified gallstones. No intra or extrahepatic biliary ductal dilation.    Pancreas: Normal.    Spleen: Normal.    Adrenals: Normal.    Kidneys and ureters: There is no obstructing urinary calculus.  There is no hydronephrosis.    Bladder: Normal.    Reproductive organs: No pelvic masses.    Stomach/bowel: No evidence of bowel obstruction. The appendix is not clearly identified.  No discernible bowel inflammation.    Lymph nodes: No pathologically enlarged lymph node identified.    Peritoneum: No ascites or free air. No fluid collection.    Vessels: No abdominal aortic aneurysm.    Abdominal wall: Normal.    Lung bases: There are consolidative changes in the right lower and middle lobes with additional scattered bibasilar airspace opacities.    Bones: No acute osseous findings.                                       X-Ray Chest PA And Lateral (Final result)  Result time 01/18/23 13:59:35      Final result by Tom Quezada MD (01/18/23  13:59:35)                   Impression:      Bilateral lungs infiltrates.      Electronically signed by: Tom Quezada  Date:    01/18/2023  Time:    13:59               Narrative:    EXAMINATION:  XR CHEST PA AND LATERAL    CLINICAL HISTORY:  SV;    TECHNIQUE:  Two views    COMPARISON:  December 6, 2022.    FINDINGS:  Cardiopericardial silhouette is within normal limits.  There is dense consolidation which involves the right middle lung lobe.  There are additional patchy infiltrates which involve the right lower lung lobe and to a lesser degree the left lower lung lobe.  No pulmonary edema or pneumothorax.  No fluid accumulation within the pleural spaces.                        ED Interpretation by Delfino Mishra MD (01/18/23 13:56:26, [facility], Emergency Medicine)    Signs of pneumonia in the right lower lobe.  No cardiomegaly or pneumothorax                                         Assessment & Plan:     HIV   Oral candidiasis   - HIV 1/2 Ag/Ab reactive   - Ordered CD4 count, HIV PCR, HIV 1/2 Antibody Diff   - Ordered PJP PCR, fungitell, Syphilis Ab, hepatitis panel  - Started nystatin suspension 100 mg qid   - Will consider opportunistic infection prophylaxis pending CD4 count     Community Acquired PNA   Suspected PJP   Sepsis   - SIRS 3/4: leukopenia, tachycardia, fever with respiratory source   - WBC 1.3   - Chest xray revealed patchy bilateral infiltrates with dense consolidation  in right middle lobe   - Given 1.5 L NS in ED and started on IV Zosyn and Vancomycin   - Will continue broad spectrum antibiotics with Zosyn and Vancomycin   - Will start Bactrim for suspected PJP infection   - Continue IVF with  mL/hr   - Ordered blood cultures   - Ordered respiratory cultures, COVID/FLU PCR   - Ordered PJP PCR, Fungitell      Hyponatremia   Hypophosphatemia   - Sodium 131   - Ordered serum osmolality, urine osmolality, and urine Na   - Stool studies,  Legoinella,Giardia/Cryptosporidium ordered   - Repleted with NaPhos   - Will continue to monitor and replete as necessary     Normocytic anemia   - H/H of 8.3/ 26.2   - Ordered iron studies and ferritin   - Type & screen ordered   - Continue to monitor H/H for further drop and need for intervention    CODE STATUS: FULL   Access: PIV   Antibiotics: zosyn, vancomycin, bactrim   Diet: regular   DVT Prophylaxis: lovenox   GI Prophylaxis:   Fluids:  ml/hr       Disposition: Admitted to IM for pneumonia and new HIV dx. Will continue treatment with IV abx and further work-up          Ronel Garrido MD  [facility] Internal Medicine HO-1

## 2023-01-18 NOTE — MEDICAL/APP STUDENT
[facility] Medicine Wards History & Physical Note     Resident Team: [facility] Medicine List 1  Attending Physician: Delfino Mishra MD  Resident: ***  Intern: ***     Date of Admit: 1/18/2023    Chief Complaint     Fatigue and Weight Loss (Pt reports generalized weakness, fatigue, and weight loss over the past month. )    Subjective:      History of Present Illness:  Hanh Hassan is a 27 y.o. male with no PMHx who presented to the ED on 1/18/2023  with a primary complaint of Fatigue and Weight Loss (Pt reports generalized weakness, fatigue, and weight loss over the past month. )    Pt last evaluated in ED (12/6/22) for oral lesions (tx w/sulfacrate) and counseled to acquire HIV testing. He states he was prompted by family members to come to the ED today for blood-work. He reports subjective fevers (pt did not use thermometer), night sweats, fatigue, weight loss (pt unsure of exact amount), vomiting of liquid (gatorade) NBNB, BL leg weakness (needs family members to help him walk), occasional blood and mucus in toilet bowl after BMs, rhinorrhea, nonproductive cough, and sore throat x1 m.o.    In the ED, patient febrile to 39.3, tachycardic to 140, all other VSS. CBC revealed H/H 8.3/26.2. BNP revealed Phos 2.2, Na 131. EKG showed sinus tachycardia. CXR revealed bilateral lung infiltrates. Pt received zosyn x1, vancomycin x1, tylenol x1, 30 cc/kg IV NS. Admit to floor.           Past Medical History:  History reviewed. No pertinent past medical history.    Past Surgical History:  History reviewed. No pertinent surgical history.    Family History:  History reviewed. No pertinent family history.    Social History:  Social History     Tobacco Use    Smoking status: Never    Smokeless tobacco: Never   Substance Use Topics    Drug use: Never     Sexual Activity: Last encounter (07/2022); multiple male partners    Allergies:  Review of patient's allergies indicates:   Allergen Reactions    Latex      Other reaction(s): rash     "Latex, natural rubber        Home Medications:  Prior to Admission medications    Not on File       Review of Systems:  Constitutional: subjective fever, fatigue  Eye: no vision loss, eye redness, drainage, or pain  ENMT: sore throat, rhinorrhea  Respiratory: cough, shortness of breath, no wheezing  Cardiovascular: no chest pain, no palpitations, no edema  Gastrointestinal: vomiting, no diarrhea, no abdominal pain  Genitourinary: no dysuria, no urinary frequency or urgency, no hematuria  Hema/Lymph: no abnormal bruising or bleeding  Musculoskeletal: no muscle or joint pain, no joint swelling  Integumentary: no skin rash or abnormal lesion  Neurologic: BL leg weakness, no headache, no dizziness, no numbness       Objective:   Last 24 Hour Vital Signs:  BP  Min: 112/70  Max: 142/98  Temp  Av.3 °F (38.5 °C)  Min: 98.2 °F (36.8 °C)  Max: 103.1 °F (39.5 °C)  Pulse  Av.2  Min: 138  Max: 168  Resp  Av.8  Min: 15  Max: 29  SpO2  Av.8 %  Min: 97 %  Max: 100 %  Height  Av' 6.14" (168 cm)  Min: 5' 6.14" (168 cm)  Max: 5' 6.14" (168 cm)  Weight  Av kg (110 lb 3.7 oz)  Min: 50 kg (110 lb 3.7 oz)  Max: 50 kg (110 lb 3.7 oz)  Body mass index is 17.72 kg/m².  No intake/output data recorded.    Physical Examination:  General: underweight, in no acute distress  Eye: clear conjunctiva, eyelids normal  HENT: several white plaques present in mouth, moist mucus membranes  Respiratory: crackles in LLs BL, respirations non-labored  Cardiovascular: fast rate, regular rhythm without murmurs, gallops or rubs  Gastrointestinal: soft, non-tender, non-distended with normal bowel sounds, without masses to palpation  Musculoskeletal: no obvious deformities  Integumentary: no rashes or skin lesions present  Extremities: no LE edema    Laboratory:  Most Recent Data:  CBC:   Lab Results   Component Value Date    WBC 1.3 (LL) 2023    HGB 8.3 (L) 2023    HCT 26.2 (L) 2023     2023    MCV " 80.4 01/18/2023    RDW 15.6 01/18/2023     WBC Differential:   Recent Labs   Lab 01/18/23  1216   WBC 1.3*   HGB 8.3*   HCT 26.2*      MCV 80.4     BMP:   Lab Results   Component Value Date     (L) 01/18/2023    K 4.6 01/18/2023    CO2 21 (L) 01/18/2023    BUN 12.7 01/18/2023    CREATININE 1.01 01/18/2023    CALCIUM 8.6 01/18/2023    MG 2.00 01/18/2023    PHOS 2.2 (L) 01/18/2023     LFTs:   Lab Results   Component Value Date    ALBUMIN 2.4 (L) 01/18/2023    BILITOT 0.9 01/18/2023    AST 28 01/18/2023    ALKPHOS 308 (H) 01/18/2023    ALT 20 01/18/2023     Coags: No results found for: INR, PROTIME, PTT  FLP: No results found for: CHOL, HDL, LDLCALC, TRIG, CHOLHDL  DM:   Lab Results   Component Value Date    CREATININE 1.01 01/18/2023     Thyroid:   Lab Results   Component Value Date    TSH 1.465 01/18/2023     Anemia: No results found for: IRON, TIBC, FERRITIN, [id number], FOLATE  Cardiac:   Lab Results   Component Value Date    TROPONINI <0.010 01/18/2023    BNP 51.8 01/18/2023     Urinalysis:   Lab Results   Component Value Date    PHUA 6.0 01/18/2023    UROBILINOGEN +4 (A) 01/18/2023    WBCUA 11-20 (A) 01/18/2023       Trended Lab Data:  Recent Labs   Lab 01/18/23  1216   WBC 1.3*   HGB 8.3*   HCT 26.2*      MCV 80.4   RDW 15.6   *   K 4.6   CO2 21*   BUN 12.7   CREATININE 1.01   ALBUMIN 2.4*   BILITOT 0.9   AST 28   ALKPHOS 308*   ALT 20       Trended Cardiac Data:  Recent Labs   Lab 01/18/23  1216   TROPONINI <0.010   BNP 51.8       Microbiology Data:   BCx (1/18/23) - Pending   UCx (1/18/23) - Pending    Other Results:  EKG (my interpretation): there are no previous tracings available for comparison, normal sinus rhythm, sinus tachycardia.    Radiology:  Imaging Results              CT Abdomen Pelvis With Contrast (Final result)  Result time 01/18/23 14:34:11      Final result by Amairani Cruz MD (01/18/23 14:34:11)                   Impression:      1. Findings concerning for  multifocal pneumonia in the lung bases.  2. No acute abnormality of the abdomen or pelvis.      Electronically signed by: Amairani Cruz  Date:    01/18/2023  Time:    14:34               Narrative:    EXAMINATION:  CT ABDOMEN PELVIS WITH CONTRAST    CLINICAL HISTORY:  Flank pain, kidney stone suspected;    TECHNIQUE:  CT imaging was performed of the abdomen and pelvis after the administration of intravenous contrast. Dose length product is 157 mGycm. Automatic exposure control, adjustment of mA/kV or iterative reconstruction technique was used to limit radiation dose.    COMPARISON:  None    FINDINGS:  Liver: Normal.    Gallbladder and biliary tree: No calcified gallstones. No intra or extrahepatic biliary ductal dilation.    Pancreas: Normal.    Spleen: Normal.    Adrenals: Normal.    Kidneys and ureters: There is no obstructing urinary calculus.  There is no hydronephrosis.    Bladder: Normal.    Reproductive organs: No pelvic masses.    Stomach/bowel: No evidence of bowel obstruction. The appendix is not clearly identified.  No discernible bowel inflammation.    Lymph nodes: No pathologically enlarged lymph node identified.    Peritoneum: No ascites or free air. No fluid collection.    Vessels: No abdominal aortic aneurysm.    Abdominal wall: Normal.    Lung bases: There are consolidative changes in the right lower and middle lobes with additional scattered bibasilar airspace opacities.    Bones: No acute osseous findings.                                       X-Ray Chest PA And Lateral (Final result)  Result time 01/18/23 13:59:35      Final result by Tom Quezada MD (01/18/23 13:59:35)                   Impression:      Bilateral lungs infiltrates.      Electronically signed by: Tom Quezada  Date:    01/18/2023  Time:    13:59               Narrative:    EXAMINATION:  XR CHEST PA AND LATERAL    CLINICAL HISTORY:  SV;    TECHNIQUE:  Two views    COMPARISON:  December 6,  2022.    FINDINGS:  Cardiopericardial silhouette is within normal limits.  There is dense consolidation which involves the right middle lung lobe.  There are additional patchy infiltrates which involve the right lower lung lobe and to a lesser degree the left lower lung lobe.  No pulmonary edema or pneumothorax.  No fluid accumulation within the pleural spaces.                        ED Interpretation by Delfino Mishra MD (01/18/23 13:56:26, [facility], Emergency Medicine)    Signs of pneumonia in the right lower lobe.  No cardiomegaly or pneumothorax                                     Assessment & Plan:     1) HIV       - HIV 1/2 Ag/Ab - Reactive in ED       - CD4, HIV PCR, HIV - 1/2 Antibody Differentiation pending       - Stool O&P, Syphilis Ab pending    2) Oral thrush       - Scattered white plaques present in oral cavity on physical exam       - Nystatin 4x daily    3) PNA       - CXR revealed bilateral lung infiltrates in ED       - Started on broad spectrum Abx with zosyn and vancomycin in ED       - Will continue IV zosyn 4.5 g TID       - Will continue IV vancomycin 750 mg BID       - Started on IV bactrim TID for presumed PJP       - Respiratory cultures, BCx, Fungitell, P. jirovecii PCR pending       - COVID/Flu PCR ordered    4) Normocytic anemia       - H/H 8.3/26.2 in ED       - Ordered Type & Screen        - Ordered iron studies and ferritin       - Will continue to monitor    5) Hypophosphatemia       - Phos 2.2 in ED       - Ordered IV sodium phosphate x1       - Will continue to monitor and replete as necessary    4) Hyponatremia       - Na 131 in ED       - Legionella Ab pending       - Ordered serum osmolality, urine osmolality, and urine sodium       - Will continue to monitor      CODE STATUS: Full Code  Access: PIV  Antibiotics: IV zosyn 4.5 g TID, IV vancomycin 750 mg BID, IV bactrim TID  Diet: Regular Diet  DVT Prophylaxis: SubQ enoxaparin 40mg QD  GI  Prophylaxis: ***  Fluids: IV  mL/hr      Disposition: Admitted to Internal Medicine for pneumonia and new HIV diagnosis. Will continue treatment with IV antibiotics and further work-up.        Phoenix Hwaung, MS4  [facility] Medical Student

## 2023-01-19 LAB
ABO + RH BLD: NORMAL
ABO + RH BLD: NORMAL
ABS NEUT CALC (OHS): 0.64 X10(3)/MCL (ref 2.1–9.2)
ABS NEUT CALC (OHS): 0.76 X10(3)/MCL (ref 2.1–9.2)
ALBUMIN SERPL-MCNC: 1.6 G/DL (ref 3.5–5)
ALBUMIN/GLOB SERPL: 0.3 RATIO (ref 1.1–2)
ALP SERPL-CCNC: 215 UNIT/L (ref 40–150)
ALT SERPL-CCNC: 13 UNIT/L (ref 0–55)
ANISOCYTOSIS BLD QL SMEAR: ABNORMAL
AST SERPL-CCNC: 21 UNIT/L (ref 5–34)
BILIRUBIN DIRECT+TOT PNL SERPL-MCNC: 0.4 MG/DL
BLD PROD TYP BPU: NORMAL
BLD PROD TYP BPU: NORMAL
BLOOD UNIT EXPIRATION DATE: NORMAL
BLOOD UNIT EXPIRATION DATE: NORMAL
BLOOD UNIT TYPE CODE: 7300
BLOOD UNIT TYPE CODE: 7300
BUN SERPL-MCNC: 8.2 MG/DL (ref 8.9–20.6)
BURR CELLS (OLG): ABNORMAL
CALCIUM SERPL-MCNC: 7.6 MG/DL (ref 8.4–10.2)
CHLORIDE SERPL-SCNC: 100 MMOL/L (ref 98–107)
CHOLEST SERPL-MCNC: 56 MG/DL
CHOLEST/HDLC SERPL: 5 {RATIO} (ref 0–5)
CO2 SERPL-SCNC: 24 MMOL/L (ref 22–29)
CREAT SERPL-MCNC: 0.67 MG/DL (ref 0.73–1.18)
CROSSMATCH INTERPRETATION: NORMAL
CROSSMATCH INTERPRETATION: NORMAL
DISPENSE STATUS: NORMAL
DISPENSE STATUS: NORMAL
EOSINOPHIL NFR BLD MANUAL: 0.04 X10(3)/MCL (ref 0–0.9)
EOSINOPHIL NFR BLD MANUAL: 4 % (ref 0–8)
ERYTHROCYTE [DISTWIDTH] IN BLOOD BY AUTOMATED COUNT: 16.1 % (ref 11.5–17)
ERYTHROCYTE [DISTWIDTH] IN BLOOD BY AUTOMATED COUNT: 16.5 % (ref 11.5–17)
GFR SERPLBLD CREATININE-BSD FMLA CKD-EPI: >60 MLS/MIN/1.73/M2
GLOBULIN SER-MCNC: 5.1 GM/DL (ref 2.4–3.5)
GLUCOSE SERPL-MCNC: 126 MG/DL (ref 74–100)
GROUP & RH: NORMAL
HCT VFR BLD AUTO: 20.7 % (ref 42–52)
HCT VFR BLD AUTO: 32.7 % (ref 42–52)
HDLC SERPL-MCNC: 11 MG/DL (ref 35–60)
HGB BLD-MCNC: 11 GM/DL (ref 14–18)
HGB BLD-MCNC: 6.7 GM/DL (ref 14–18)
HYPOCHROMIA BLD QL SMEAR: SLIGHT
IMM GRANULOCYTES # BLD AUTO: 0.42 X10(3)/MCL (ref 0–0.04)
IMM GRANULOCYTES # BLD AUTO: 0.59 X10(3)/MCL (ref 0–0.04)
IMM GRANULOCYTES NFR BLD AUTO: 20.8 %
IMM GRANULOCYTES NFR BLD AUTO: 56.2 %
INDIRECT COOMBS GEL: NORMAL
LDLC SERPL CALC-MCNC: 31 MG/DL (ref 50–140)
LYMPHOCYTES NFR BLD MANUAL: 0.32 X10(3)/MCL
LYMPHOCYTES NFR BLD MANUAL: 0.68 X10(3)/MCL
LYMPHOCYTES NFR BLD MANUAL: 29 % (ref 13–40)
LYMPHOCYTES NFR BLD MANUAL: 34 % (ref 13–40)
MAGNESIUM SERPL-MCNC: 2 MG/DL (ref 1.6–2.6)
MCH RBC QN AUTO: 25.5 PG
MCH RBC QN AUTO: 26.6 PG
MCHC RBC AUTO-ENTMCNC: 32.4 MG/DL (ref 33–36)
MCHC RBC AUTO-ENTMCNC: 33.6 MG/DL (ref 33–36)
MCV RBC AUTO: 78.7 FL (ref 80–94)
MCV RBC AUTO: 79.2 FL (ref 80–94)
METAMYELOCYTES NFR BLD MANUAL: 7 %
METAMYELOCYTES NFR BLD MANUAL: 8 %
MICROCYTES BLD QL SMEAR: ABNORMAL
MONOCYTES NFR BLD MANUAL: 0.1 X10(3)/MCL (ref 0.1–1.3)
MONOCYTES NFR BLD MANUAL: 0.56 X10(3)/MCL (ref 0.1–1.3)
MONOCYTES NFR BLD MANUAL: 28 % (ref 2–11)
MONOCYTES NFR BLD MANUAL: 9 % (ref 2–11)
NEUTROPHILS NFR BLD MANUAL: 31 % (ref 47–80)
NEUTROPHILS NFR BLD MANUAL: 49 % (ref 47–80)
NRBC BLD AUTO-RTO: 0 %
NRBC BLD AUTO-RTO: 0 %
OVALOCYTES (OLG): ABNORMAL
PHOSPHATE SERPL-MCNC: 3.2 MG/DL (ref 2.3–4.7)
PLATELET # BLD AUTO: 192 X10(3)/MCL (ref 130–400)
PLATELET # BLD AUTO: 199 X10(3)/MCL (ref 130–400)
PLATELET # BLD EST: NORMAL 10*3/UL
PLATELET # BLD EST: NORMAL 10*3/UL
PMV BLD AUTO: 11.7 FL (ref 7.4–10.4)
PMV BLD AUTO: 12 FL (ref 7.4–10.4)
POIKILOCYTOSIS BLD QL SMEAR: ABNORMAL
POLYCHROMASIA BLD QL SMEAR: SLIGHT
POTASSIUM SERPL-SCNC: 3.1 MMOL/L (ref 3.5–5.1)
PROMYELOCYTES # BLD MANUAL: 1 %
PROT SERPL-MCNC: 6.7 GM/DL (ref 6.4–8.3)
RBC # BLD AUTO: 2.63 X10(6)/MCL (ref 4.7–6.1)
RBC # BLD AUTO: 4.13 X10(6)/MCL (ref 4.7–6.1)
RBC MORPH BLD: ABNORMAL
RBC MORPH BLD: NORMAL
RPR SER QL: REACTIVE
RPR SER-TITR: ABNORMAL {TITER}
SODIUM SERPL-SCNC: 133 MMOL/L (ref 136–145)
TARGETS BLD QL SMEAR: SLIGHT
TEAR DROP CELL (OLG): ABNORMAL
TRIGL SERPL-MCNC: 72 MG/DL (ref 34–140)
UNIT NUMBER: NORMAL
UNIT NUMBER: NORMAL
VANCOMYCIN TROUGH SERPL-MCNC: 12.8 UG/ML (ref 15–20)
VLDLC SERPL CALC-MCNC: 14 MG/DL
WBC # SPEC AUTO: 1.1 X10(3)/MCL (ref 4.5–11.5)
WBC # SPEC AUTO: 2 X10(3)/MCL (ref 4.5–11.5)

## 2023-01-19 PROCEDURE — 36415 COLL VENOUS BLD VENIPUNCTURE: CPT

## 2023-01-19 PROCEDURE — S0039 INJECTION, SULFAMETHOXAZOLE: HCPCS

## 2023-01-19 PROCEDURE — 86900 BLOOD TYPING SEROLOGIC ABO: CPT

## 2023-01-19 PROCEDURE — 11000001 HC ACUTE MED/SURG PRIVATE ROOM

## 2023-01-19 PROCEDURE — 85027 COMPLETE CBC AUTOMATED: CPT

## 2023-01-19 PROCEDURE — 25000003 PHARM REV CODE 250

## 2023-01-19 PROCEDURE — 97165 OT EVAL LOW COMPLEX 30 MIN: CPT

## 2023-01-19 PROCEDURE — 25000003 PHARM REV CODE 250: Performed by: FAMILY MEDICINE

## 2023-01-19 PROCEDURE — 80053 COMPREHEN METABOLIC PANEL: CPT

## 2023-01-19 PROCEDURE — 83735 ASSAY OF MAGNESIUM: CPT

## 2023-01-19 PROCEDURE — 63600175 PHARM REV CODE 636 W HCPCS: Performed by: FAMILY MEDICINE

## 2023-01-19 PROCEDURE — 63600175 PHARM REV CODE 636 W HCPCS

## 2023-01-19 PROCEDURE — 84100 ASSAY OF PHOSPHORUS: CPT

## 2023-01-19 PROCEDURE — A4216 STERILE WATER/SALINE, 10 ML: HCPCS

## 2023-01-19 PROCEDURE — 97162 PT EVAL MOD COMPLEX 30 MIN: CPT

## 2023-01-19 PROCEDURE — 27000207 HC ISOLATION

## 2023-01-19 PROCEDURE — 80202 ASSAY OF VANCOMYCIN: CPT | Performed by: FAMILY MEDICINE

## 2023-01-19 PROCEDURE — 36415 COLL VENOUS BLD VENIPUNCTURE: CPT | Performed by: FAMILY MEDICINE

## 2023-01-19 PROCEDURE — 94761 N-INVAS EAR/PLS OXIMETRY MLT: CPT

## 2023-01-19 PROCEDURE — 80061 LIPID PANEL: CPT

## 2023-01-19 PROCEDURE — 36430 TRANSFUSION BLD/BLD COMPNT: CPT

## 2023-01-19 PROCEDURE — 21400001 HC TELEMETRY ROOM

## 2023-01-19 PROCEDURE — 87591 N.GONORRHOEAE DNA AMP PROB: CPT

## 2023-01-19 PROCEDURE — 85007 BL SMEAR W/DIFF WBC COUNT: CPT

## 2023-01-19 PROCEDURE — P9016 RBC LEUKOCYTES REDUCED: HCPCS

## 2023-01-19 RX ORDER — POTASSIUM CHLORIDE 750 MG/1
30 CAPSULE, EXTENDED RELEASE ORAL ONCE
Status: COMPLETED | OUTPATIENT
Start: 2023-01-19 | End: 2023-01-19

## 2023-01-19 RX ORDER — ALUMINUM HYDROXIDE, MAGNESIUM HYDROXIDE, AND SIMETHICONE 2400; 240; 2400 MG/30ML; MG/30ML; MG/30ML
10 SUSPENSION ORAL EVERY 6 HOURS PRN
COMMUNITY
End: 2023-02-23

## 2023-01-19 RX ORDER — ONDANSETRON 4 MG/1
8 TABLET, ORALLY DISINTEGRATING ORAL EVERY 8 HOURS PRN
Status: DISCONTINUED | OUTPATIENT
Start: 2023-01-19 | End: 2023-01-22

## 2023-01-19 RX ORDER — HYDROCODONE BITARTRATE AND ACETAMINOPHEN 500; 5 MG/1; MG/1
TABLET ORAL
Status: DISCONTINUED | OUTPATIENT
Start: 2023-01-19 | End: 2023-01-26 | Stop reason: HOSPADM

## 2023-01-19 RX ADMIN — NYSTATIN 500000 UNITS: 100000 SUSPENSION ORAL at 05:01

## 2023-01-19 RX ADMIN — PIPERACILLIN AND TAZOBACTAM 4.5 G: 4; .5 INJECTION, POWDER, LYOPHILIZED, FOR SOLUTION INTRAVENOUS; PARENTERAL at 04:01

## 2023-01-19 RX ADMIN — POTASSIUM CHLORIDE 30 MEQ: 10 CAPSULE, COATED, EXTENDED RELEASE ORAL at 08:01

## 2023-01-19 RX ADMIN — PIPERACILLIN AND TAZOBACTAM 4.5 G: 4; .5 INJECTION, POWDER, LYOPHILIZED, FOR SOLUTION INTRAVENOUS; PARENTERAL at 11:01

## 2023-01-19 RX ADMIN — ONDANSETRON 8 MG: 4 TABLET, ORALLY DISINTEGRATING ORAL at 05:01

## 2023-01-19 RX ADMIN — SULFAMETHOXAZOLE AND TRIMETHOPRIM 333.44 MG: 80; 16 INJECTION INTRAVENOUS at 02:01

## 2023-01-19 RX ADMIN — VANCOMYCIN HYDROCHLORIDE 750 MG: 750 INJECTION, POWDER, LYOPHILIZED, FOR SOLUTION INTRAVENOUS at 04:01

## 2023-01-19 RX ADMIN — PIPERACILLIN AND TAZOBACTAM 4.5 G: 4; .5 INJECTION, POWDER, LYOPHILIZED, FOR SOLUTION INTRAVENOUS; PARENTERAL at 06:01

## 2023-01-19 RX ADMIN — NYSTATIN 500000 UNITS: 100000 SUSPENSION ORAL at 12:01

## 2023-01-19 RX ADMIN — ONDANSETRON 8 MG: 4 TABLET, ORALLY DISINTEGRATING ORAL at 04:01

## 2023-01-19 RX ADMIN — VANCOMYCIN HYDROCHLORIDE 750 MG: 750 INJECTION, POWDER, LYOPHILIZED, FOR SOLUTION INTRAVENOUS at 05:01

## 2023-01-19 RX ADMIN — SULFAMETHOXAZOLE AND TRIMETHOPRIM 333.44 MG: 80; 16 INJECTION INTRAVENOUS at 10:01

## 2023-01-19 RX ADMIN — SODIUM CHLORIDE, POTASSIUM CHLORIDE, SODIUM LACTATE AND CALCIUM CHLORIDE: 600; 310; 30; 20 INJECTION, SOLUTION INTRAVENOUS at 06:01

## 2023-01-19 RX ADMIN — Medication 10 ML: at 10:01

## 2023-01-19 RX ADMIN — SULFAMETHOXAZOLE AND TRIMETHOPRIM 333.44 MG: 80; 16 INJECTION INTRAVENOUS at 06:01

## 2023-01-19 RX ADMIN — NYSTATIN 500000 UNITS: 100000 SUSPENSION ORAL at 09:01

## 2023-01-19 RX ADMIN — NYSTATIN 500000 UNITS: 100000 SUSPENSION ORAL at 08:01

## 2023-01-19 NOTE — PT/OT/SLP EVAL
Occupational Therapy   Evaluation    Name: Hanh Hassan  MRN: [id number]  Admitting Diagnosis: HIV infection, pneumonia, tachycardia  Recent Surgery: * No surgery found *      Recommendations:     Discharge Recommendations: home, home health PT  Discharge Equipment Recommendations:  none  Barriers to discharge:  None    Assessment:     Hanh Hassan is a 27 y.o. male with a medical diagnosis as noted above.  He presents with functional decline, recent nausea/vomiting limiting appetite. Performance deficits affecting function: weakness, impaired endurance, impaired functional mobility, impaired balance, impaired cognition, decreased coordination.      Rehab Prognosis: Good; patient would benefit from acute skilled OT services to address these deficits and reach maximum level of function.       Plan:     Patient to be seen 2 x/week, 5 x/week (M-F) to address the above listed problems via self-care/home management, therapeutic activities, therapeutic exercises  Plan of Care Expires:    Plan of Care Reviewed with: patient, family    Subjective     Chief Complaint: weakness, nausea/vomiting, deconditioning, weight loss  Patient/Family Comments/goals: regain strength, return home at PLOF (independent)    Occupational Profile:  Living Environment: [facility] with his mother, 1 step to enter  Previous level of function: independent with ADLs and mobility  Roles and Routines: none stated, pt does not drive or work  Equipment Used at Home: none  Assistance upon Discharge: no anticipated need    Pain/Comfort:  Pain Rating 1: 0/10    Patients cultural, spiritual, Jainism conflicts given the current situation: no    Objective:     Communicated with: nurse Carroll prior to session.  Patient found HOB elevated with peripheral IV, pulse ox (continuous), telemetry, blood pressure cuff upon OT entry to room.    General Precautions: Standard, contact, fall, neutropenic  Orthopedic Precautions:    Braces:    Respiratory Status: Room  air  Initial vital signs:  139/75, O2 sat 95%, HR 94  Final vital signs:  119/73, O2 sat 98%,     Occupational Performance:    Bed Mobility:    Patient completed Supine to Sit with contact guard assistance  Patient completed Sit to Supine with contact guard assistance    Functional Mobility/Transfers:  Patient completed Sit <> Stand Transfer with contact guard assistance and minimum assistance  with  hand-held assist   Functional Mobility: CGA sidestepping 6 steps using RW    Activities of Daily Living:  Feeding:  NT d/t pt not eating d/t c/o nausea/vomiting this am, improved at this time but pt has not yet eaten today  Grooming: contact guard assistance in bed with HOB elevated to brush teeth  Bathing: maximal assistance .  Upper Body Dressing: minimum assistance seated EOB  Lower Body Dressing: minimum assistance seated EOB for assist to maintain sitting balance during task  Toileting: minimum assistance using urinal    Cognitive/Visual Perceptual:  Cognitive/Psychosocial Skills:     -       Oriented to: Person, Place, and Situation   -       Follows Commands/attention:Follows one-step commands  -       Safety awareness/insight to disability: intact     Physical Exam:  BUE AROM WFL, strength 3+/5    AMPAC 6 Click ADL:  AMPAC Total Score:      Treatment & Education:  OT goals and POC, use of call bell for assist with all mobility OOB d/t fall risk    Patient left HOB elevated with all lines intact, call button in reach, and pt's aunt present    GOALS:   Multidisciplinary Problems       Occupational Therapy Goals          Problem: Occupational Therapy    Goal Priority Disciplines Outcome Interventions   Occupational Therapy Goal     OT, PT/OT Ongoing, Progressing    Description: Patient will perform feeding with mod I      Patient will perform grooming with mod I while standing at sink    Patient will perform toileting with mod I    Patient will perform toilet transfer with mod I with use of RW or grab  bars    Patient will perform upper body dressing with mod I while seated EOB     Patient will perform lower body dressing with mod I while seated EOB                          History:     History reviewed. No pertinent past medical history.    History reviewed. No pertinent surgical history.    Time Tracking:     OT Date of Treatment: 01/19/23  OT Start Time: 1037  OT Stop Time: 1105  OT Total Time (min): 28 min    Billable Minutes:Evaluation 28    1/19/2023

## 2023-01-19 NOTE — PROGRESS NOTES
Pharmacokinetic Assessment Follow Up: IV Vancomycin    Vancomycin serum concentration assessment(s):    The trough level was drawn correctly and can be used to guide therapy at this time. The measurement is below the desired definitive target range of 15 to 20 mcg/mL.    Vancomycin Regimen Plan:    Continue regimen to Vancomycin 750 mg IV every 12 hours with next serum trough concentration measured at 1500 prior to 3rd dose on 1/20/2023    Drug levels (last 3 results):  Recent Labs   Lab Result Units 01/19/23  1509   Vancomycin Trough ug/ml 12.8*       Pharmacy will continue to follow and monitor vancomycin.    Please contact pharmacy at extension [phone] for questions regarding this assessment.    Thank you for the consult,   Lacy Magallanes       Patient brief summary:  Hanh Hassan is a 27 y.o. male initiated on antimicrobial therapy with IV Vancomycin for treatment of lower respiratory infection    The patient's current regimen is 750 mg IV q12h    Drug Allergies:   Review of patient's allergies indicates:   Allergen Reactions    Latex      Other reaction(s): rash    Latex, natural rubber        Actual Body Weight:   50 kg    Renal Function:   Estimated Creatinine Clearance: 117.1 mL/min (A) (based on SCr of 0.67 mg/dL (L)).,     Dialysis Method (if applicable):  N/A    CBC (last 72 hours):  Recent Labs   Lab Result Units 01/18/23  1216 01/18/23  1643 01/18/23  1804 01/19/23  0251   WBC x10(3)/mcL 1.3*  --  1.1* 1.1*   Hgb gm/dL 8.3*  --  7.2* 6.7*   Hemoglobin A1c %  --  5.5  --   --    Hct % 26.2*  --  22.9* 20.7*   Platelet x10(3)/mcL 268  --  220 192   Mono % % 25.6  --   --   --    Monocyte Man %  --   --  31* 9   Eos % % 0.0  --   --   --    Eos Man %  --   --   --  4   Basophil % % 0.0  --   --   --        Metabolic Panel (last 72 hours):  Recent Labs   Lab Result Units 01/18/23  1216 01/18/23  1232 01/18/23  1643 01/19/23  0251   Sodium Level mmol/L 131*  --   --  133*   Urine Sodium mmol/L  --  <20.0  --   --     Potassium Level mmol/L 4.6  --   --  3.1*   Chloride mmol/L 96*  --   --  100   Carbon Dioxide mmol/L 21*  --   --  24   Glucose Level mg/dL 148*  --   --  126*   Glucose, UA mg/dL  --  Normal  --   --    Blood Urea Nitrogen mg/dL 12.7  --   --  8.2*   Creatinine mg/dL 1.01  --   --  0.67*   Albumin Level g/dL 2.4*  --   --  1.6*   Bilirubin Total mg/dL 0.9  --   --  0.4   Alkaline Phosphatase unit/L 308*  --   --  215*   Aspartate Aminotransferase unit/L 28  --   --  21   Alanine Aminotransferase unit/L 20  --   --  13   Magnesium Level mg/dL 2.00  --  1.80 2.00   Phosphorus Level mg/dL 2.2*  --  3.2 3.2       Vancomycin Administrations:  vancomycin given in the last 96 hours                     vancomycin 750 mg in sodium chloride 0.9% 250 mL IVPB (mg) 750 mg New Bag 01/19/23 0430    vancomycin (VANCOCIN) 1,250 mg in sodium chloride 0.9% 250 mL IVPB (mg) 1,250 mg New Bag 01/18/23 1551                    Microbiologic Results:  Microbiology Results (last 7 days)       Procedure Component Value Units Date/Time    Respiratory Culture [[id number]] Collected: 01/18/23 1608    Order Status: Completed Specimen: Sputum, Expectorated Updated: 01/19/23 0709     Respiratory Culture No Growth At 24 Hours    Urine culture [[id number]] Collected: 01/18/23 1232    Order Status: Completed Specimen: Urine Updated: 01/19/23 0641     Urine Culture No Growth At 24 Hours    Cryptococcal antigen, blood [[id number]] Collected: 01/18/23 1916    Order Status: Completed Specimen: Blood, Venous Updated: 01/18/23 1937     CRYPTOCOCCAL ANTIGEN, SERUM ([facility]) Negative     CRYPTOCOCCAL ANTIGEN TITER ([facility]) --    Blood Culture (site 1) [[id number]]     Order Status: Canceled Specimen: Blood     Blood Culture (site 2) [[id number]]     Order Status: Canceled Specimen: Blood     Stool Culture [[id number]]     Order Status: Sent Specimen: Stool     Blood Culture [[id number]] Collected: 01/18/23 1425    Order Status: Resulted Specimen: Blood from Forearm,  Right Updated: 01/18/23 1427    Blood Culture [[id number]] Collected: 01/18/23 1402    Order Status: Resulted Specimen: Blood from Arm, Right Updated: 01/18/23 4141

## 2023-01-19 NOTE — PLAN OF CARE
Problem: Occupational Therapy  Goal: Occupational Therapy Goal  Description: Patient will perform feeding with mod I      Patient will perform grooming with mod I while standing at sink    Patient will perform toileting with mod I    Patient will perform toilet transfer with mod I with use of RW or grab bars    Patient will perform upper body dressing with mod I while seated EOB     Patient will perform lower body dressing with mod I while seated EOB     Outcome: Ongoing, Progressing

## 2023-01-19 NOTE — PT/OT/SLP EVAL
"Physical Therapy Evaluation    Patient Name:  Hanh Hassan   MRN:  [id number]    Recommendations:     Discharge Recommendations: home with home health   Discharge Equipment Recommendations: walker, rolling   Barriers to discharge:  fall risk, increased comorbidities, increased weakness, decreased ambulation distances, decreased strength    Assessment:     Hanh Hassan is a 27 y.o. male admitted with a medical diagnosis of: HIV infection, enhanced respiratory, unspecified symptoms. He presents with the following impairments/functional limitations: weakness, impaired endurance, impaired self care skills, impaired functional mobility, gait instability, decreased coordination, decreased lower extremity function, impaired cardiopulmonary response to activity. Pt currently receiving blood during evaluation, Pt displayed increased mild tremors in B Ue's and BLE's throughout session. Performed Co-eval with OT. Pt's aunt present during session.     Rehab Prognosis: Fair; patient would benefit from acute skilled PT services to address these deficits and reach maximum level of function.    Recent Surgery: * No surgery found *      Plan:     During this hospitalization, patient to be seen  (3-5 times per week) to address the identified rehab impairments via gait training, therapeutic activities, therapeutic exercises, neuromuscular re-education and progress toward the following goals:    Plan of Care Expires:  02/16/23    Subjective     Chief Complaint: "I am kind of cold."  Patient/Family Comments/goals: "to return home."  Pain/Comfort:  Pain Rating 1: 0/10  Pain Rating Post-Intervention 1: 0/10    Patients cultural, spiritual, Denominational conflicts given the current situation: no    Living Environment:  Pt states that he lives with   Prior to admission, patients level of function was Pt notes that he lives with his mother in single story home with 1 step with no hand rails. Pt notes that he does not drive and does not work " at this time. He states that he was previously independent except for the past 2 weeks he noticed increased weakness in B LE's.  Equipment used at home: none.  DME owned (not currently used): none.  Upon discharge, patient will have assistance from Mother.    Objective:     Communicated with RN prior to session.  Patient found HOB elevated with peripheral IV, PICC line, telemetry, blood pressure cuff, pulse ox (continuous)  upon PT entry to room.    General Precautions: Standard, fall; contact  Orthopedic Precautions:N/A   Braces: N/A  Respiratory Status: Room air    Exams:  Cognitive Exam:  Patient is oriented to Person, Place, Time, and Situation  Gross Motor Coordination:  WFL  Sensation:    -       Intact  light/touch B LE's grossly  RLE ROM: WFL  RLE Strength: Deficits: 4/5 MMT strength grossly; 3+/5 Mmt strength hip flexion  LLE ROM: WFL  LLE Strength: Deficits: 4/5 MMT strength grossly; 3+/5 MMT strength hip flexion     Functional Mobility:  Bed Mobility:     Supine to Sit: contact guard assistance  Sit to Supine: contact guard assistance  Transfers:     Sit to Stand:  contact guard assistance with rolling walker (Sp02 87-93%)  Gait: R and L side steps along bedside with RW at CGA-Min A (mild decreased balance upon standing) decreased R and L hip flexion and decreased HEIDI)  Balance: static standing: CGA (increased postural sway; use of RW (Fair-);   Dynamic standing: CGA-Min A (increased postural sway possibly due to weakness) (Poor + to Fair-)      Treatment & Education:  Pt educated about pursed lips breathing and decreasing valsalva maneuver to increase SP02 during activities.     Patient left HOB elevated with all lines intact, call button in reach, RN notified, and Pt's aunt present.     Vitals   Vitals at Rest  BP  139/75   HR  94   O2 Sat  95%   Pain        Vitals after activity: in long sitting  BP  119/73   HR  117   O2 Sat  98%   Pain           GOALS:   Multidisciplinary Problems       Physical  Therapy Goals          Problem: Physical Therapy    Goal Priority Disciplines Outcome Goal Variances Interventions   Physical Therapy Goal     PT, PT/OT Ongoing, Progressing     Description: Goals to be met by: Discharge     Patient will increase functional independence with mobility by performing:    -. Supine to sit with Modified [place]  -. Sit to supine with Modified [place]  -. Sit to stand transfer with Supervision   -. Gait  x 260 feet with Stand-by Assistance using Rolling Walker.                          History:     History reviewed. No pertinent past medical history.    History reviewed. No pertinent surgical history.    Time Tracking:     PT Received On: 01/19/23  PT Start Time: 1037     PT Stop Time: 1101  PT Total Time (min): 24 min     Billable Minutes: Evaluation 24 01/19/2023

## 2023-01-19 NOTE — MEDICAL/APP STUDENT
[facility] Medicine Wards Progress Note     Resident Team: [facility] Medicine List 1  Attending Physician: Evelyn Kc MD  Resident: ***  Intern: ***       Subjective:      Brief HPI:  Latasha Hassan is a 27 y.o. M pt with no significant PMHx who presented to [facility] ED on 23 for blood-work following recent ED visit in December for sore throat, cough, and pain when swallowing both solids and liquids. He was also noted to have several oral lesions on soft palate and was advised to get tested for HIV at the health unit. Over the last month, patient reports experiencing fevers, rhinorrhea, productive cough, weight loss, decreased appetite, nausea, vomiting, fatigue, and night sweats. He also reports recent bilateral lower extremity weakness requiring assistance ambulating.    In the ED, he was febrile (Tmax 103), normotensive, tachycardic (140-168), and satting 97% on room air. Labs were significant for leukopenia (WBC 1.3), anemia, hyponatremia, hypophosphatemia, elevated ALP, and positive HIV. Chest XR revealed patchy bilateral lung infiltrates with dense consolidations in the right middle lobe. Internal Medicine was consulted for new HIV diagnosis and pneumonia.    Interval History: AFVSS. Patient ate around 7AM and reports feeling nauseated later and vomiting liquid x1, NBNB. Otherwise feeling well.      Review of Systems:  ROS completed and negative except as indicated above.     Objective:     Last 24 Hour Vital Signs:  BP  Min: 100/72  Max: 142/98  Temp  Av.5 °F (37.5 °C)  Min: 97.2 °F (36.2 °C)  Max: 103.1 °F (39.5 °C)  Pulse  Av.2  Min: 63  Max: 144  Resp  Av.7  Min: 15  Max: 29  SpO2  Av %  Min: 96 %  Max: 100 %  I/O last 3 completed shifts:  In: 1487.7 [I.V.:966.7; IV Piggyback:521]  Out: 350 [Urine:350]    Physical Examination:  General: underweight, in no acute distress  Eye: clear conjunctiva, eyelids normal  HENT: well-defined white plaques located on soft palate of oral cavity  Neck:  full range of motion, no thyromegaly or lymphadenopathy  Respiratory: crackles in bilateral lung bases, respirations non-labored  Cardiovascular: regular rate and rhythm without murmurs, gallops or rubs  Gastrointestinal: soft, non-tender, non-distended with normal bowel sounds, without masses to palpation  Musculoskeletal: no obvious deformities, delayed movement of extremities  Integumentary: no rashes or skin lesions present  Extremities: no LE edema  Neurologic: no signs of peripheral neurological deficit    Laboratory:  Most Recent Data:  CBC:   Lab Results   Component Value Date    WBC 1.1 (LL) 01/19/2023    HGB 6.7 (L) 01/19/2023    HCT 20.7 (L) 01/19/2023     01/19/2023    MCV 78.7 (L) 01/19/2023    RDW 16.1 01/19/2023     WBC Differential:   Recent Labs   Lab 01/18/23  1216 01/18/23  1804 01/19/23  0251   WBC 1.3* 1.1* 1.1*   HGB 8.3* 7.2* 6.7*   HCT 26.2* 22.9* 20.7*    220 192   MCV 80.4 80.4 78.7*     BMP:   Lab Results   Component Value Date     (L) 01/19/2023    K 3.1 (L) 01/19/2023    CO2 24 01/19/2023    BUN 8.2 (L) 01/19/2023    CREATININE 0.67 (L) 01/19/2023    CALCIUM 7.6 (L) 01/19/2023    MG 2.00 01/19/2023    PHOS 3.2 01/19/2023     LFTs:   Lab Results   Component Value Date    ALBUMIN 1.6 (L) 01/19/2023    BILITOT 0.4 01/19/2023    AST 21 01/19/2023    ALKPHOS 215 (H) 01/19/2023    ALT 13 01/19/2023     Coags: No results found for: INR, PROTIME, PTT  FLP:   Lab Results   Component Value Date    CHOL 56 01/19/2023    HDL 11 (L) 01/19/2023    TRIG 72 01/19/2023     DM:   Lab Results   Component Value Date    HGBA1C 5.5 01/18/2023    CREATININE 0.67 (L) 01/19/2023     Thyroid:   Lab Results   Component Value Date    TSH 1.435 01/18/2023     Anemia:   Lab Results   Component Value Date    IRON 7 (L) 01/18/2023    TIBC 125 (L) 01/18/2023    FERRITIN 2,325.58 (H) 01/18/2023     Cardiac:   Lab Results   Component Value Date    TROPONINI <0.010 01/18/2023    BNP 51.8 01/18/2023        Microbiology Data Reviewed: yes  Pertinent Findings:  1/18/23 Cryptococcal antigen, serum - Negative  1/18/23 Respiratory Culture - No growth at 24 hrs  1/18/23 Stool Culture - Needs to be collected  1/18/23 Blood Culture x2 - Pending  1/18/23 Urine Culture - No growth at 24 hrs    Other Results:  Radiology:  Imaging Results              CT Abdomen Pelvis With Contrast (Final result)  Result time 01/18/23 14:34:11      Final result by Amairani Cruz MD (01/18/23 14:34:11)                   Impression:      1. Findings concerning for multifocal pneumonia in the lung bases.  2. No acute abnormality of the abdomen or pelvis.      Electronically signed by: Amairani Cruz  Date:    01/18/2023  Time:    14:34               Narrative:    EXAMINATION:  CT ABDOMEN PELVIS WITH CONTRAST    CLINICAL HISTORY:  Flank pain, kidney stone suspected;    TECHNIQUE:  CT imaging was performed of the abdomen and pelvis after the administration of intravenous contrast. Dose length product is 157 mGycm. Automatic exposure control, adjustment of mA/kV or iterative reconstruction technique was used to limit radiation dose.    COMPARISON:  None    FINDINGS:  Liver: Normal.    Gallbladder and biliary tree: No calcified gallstones. No intra or extrahepatic biliary ductal dilation.    Pancreas: Normal.    Spleen: Normal.    Adrenals: Normal.    Kidneys and ureters: There is no obstructing urinary calculus.  There is no hydronephrosis.    Bladder: Normal.    Reproductive organs: No pelvic masses.    Stomach/bowel: No evidence of bowel obstruction. The appendix is not clearly identified.  No discernible bowel inflammation.    Lymph nodes: No pathologically enlarged lymph node identified.    Peritoneum: No ascites or free air. No fluid collection.    Vessels: No abdominal aortic aneurysm.    Abdominal wall: Normal.    Lung bases: There are consolidative changes in the right lower and middle lobes with additional scattered bibasilar  airspace opacities.    Bones: No acute osseous findings.                                       X-Ray Chest PA And Lateral (Final result)  Result time 01/18/23 13:59:35      Final result by Tom Quezada MD (01/18/23 13:59:35)                   Impression:      Bilateral lungs infiltrates.      Electronically signed by: Tom Quezada  Date:    01/18/2023  Time:    13:59               Narrative:    EXAMINATION:  XR CHEST PA AND LATERAL    CLINICAL HISTORY:  SV;    TECHNIQUE:  Two views    COMPARISON:  December 6, 2022.    FINDINGS:  Cardiopericardial silhouette is within normal limits.  There is dense consolidation which involves the right middle lung lobe.  There are additional patchy infiltrates which involve the right lower lung lobe and to a lesser degree the left lower lung lobe.  No pulmonary edema or pneumothorax.  No fluid accumulation within the pleural spaces.                        ED Interpretation by Delfino Mishra MD (01/18/23 13:56:26, [facility], Emergency Medicine)    Signs of pneumonia in the right lower lobe.  No cardiomegaly or pneumothorax                                    Current Medications:     Infusions: None         Scheduled:   enoxaparin  40 mg Subcutaneous Daily    nystatin  500,000 Units Oral QID    piperacillin-tazobactam (ZOSYN) IVPB  4.5 g Intravenous Q8H    trimethoprim-sulfamethoxasole (BACTRIM) IVPB (for non fluid restricted pts) (fixed ratio)  20 mg/kg/day Intravenous Q8H    vancomycin (VANCOCIN) IVPB  750 mg Intravenous Q12H        PRN:  sodium chloride, acetaminophen, dextrose 10%, dextrose 10%, glucagon (human recombinant), glucose, glucose, naloxone, ondansetron, sodium chloride 0.9%, Pharmacy to dose Vancomycin consult **AND** vancomycin - pharmacy to dose    Antibiotics and Day Number of Therapy:  IV zosyn 4.5 g TID - Day 2  IV vancomycin 750 mg BID - Day 2  IV bactrim 20 mg/kg/d TID - Day 2  Nystatin suspension QID - Day 2    Lines and Day  Number of Therapy:  None    Assessment & Plan:     1) HIV      Oral candidiasis       - 1/18/23 - HIV 1/2 Ag/Ab reactive       - 1/19/23 - Hepatitis panel negative       - CD4 count, HIV PCR, HIV 1/2 Ab Diff pending       - Continue nystatin suspension 100mg QID    2) CAP      Sepsis       - SIRS 3/4: leukopenia, tachycardia, fever with respiratory source       - 1/19/23 - WBC 1.1; 1/18/23 - WBC 1.3       - 1/18/23 - CXR revealed patchy bilateral infiltrates with dense consolidation in right middle lobe       - 1/19/23 - COVID/RSV/Flu A&B PCR negative       - PJP PCR, fungitell pending       - BCx x2, Respiratory Cx pending       - Continue IV bactrim for suspected PJP infection       - Continue IV zosyn       - Continue IV vancomycin       - Continue IVF with  mL/hr         3) Syphilis       - 1/19/23 - Syphilis Ab reactive       - 1/19/23 - RPR reactive; 1/19/23 RPR Titer - 64 dils; no previous RPR titer in chart       - F/u health clinic    4)  Microcytic anemia       AoCD       - 1/19/23 H/H - 6.7/20.7; 1/18/23 H/H - 8.3/26.2       - 1/18/23 Ferritin - 2325.58, 1/18/23 Iron - 7, 1/18/23 Transferrin - 109, 1/18/23 TIBC - 125, 1/18/23 - Iron Saturation - 6       - 1/19/23 Type & Screen - B Pos       - Transfused RBC 2U       - Ordered Occult Blood Stool, CA Screen       - Ordered Occult Blood, Stool Screening (1-3)       - Continue to monitor H/H    5)  Hyponatremia       Hypokalemia       - 1/19/23 Na - 133; 1/18/23 Na - 131       - 1/19/23 K - 3.1       - 1/18/23 Serum osmolality - 272, Urine osmolality - 657, Urine Na - <20       - 1/19/23 Phos - 3.2 normalized from 2.2 (1/18/23)       - Stool O&P, giardia/cryptosporidium antigen pending       - Continue to monitor and replete as necessary      CODE STATUS: Full Code  Access: PIV  Antibiotics: zosyn, vancomycin, bactrim  Diet: Neutropenic diet  DVT Prophylaxis: lovenox  GI Prophylaxis: N/A  Fluids:  mL/hr      Disposition: Admitted to IM for  pneumonia and new HIV dx. Will continue tx with IV abx and further work-up.    Phoenix Hwaung, MS4  [facility] [employer]

## 2023-01-19 NOTE — CONSULTS
Inpatient Nutrition Assessment    Admit Date: 1/18/2023   Total duration of encounter: 1 day     Nutrition Recommendation/Prescription     Continue Neutropenic diet (encourage oral intake)  Will send chocolate boost plus tid--360kcal; 14 gm protein per serving  Suggest megace to stimulate appetite  MVI/fe  Biweekly wt  If pt remains with poor po intake--? Need supplemental PPN to help meet nutrient needs: Clinimix 4.25/5 @ 70ml/hr with lipids 20% 250 ml every other day to provide 821 calories, 71 gm protein.     Communication of Recommendations: reviewed with patient/caregiver and reviewed with nurse    Nutrition Assessment     Malnutrition Assessment/Nutrition-Focused Physical Exam    Malnutrition in the context of acute illness or injury  Degree of Malnutrition: severe malnutrition  Energy Intake: </= 50% of estimated energy requirement for >/= 5 days  Interpretation of Weight Loss: >7.5% in 3 months  Body Fat:moderate depletion  Area of Body Fat Loss: orbital region  and upper arm region - triceps / biceps  Muscle Mass Loss: moderate depletion  Area of Muscle Mass Loss: temple region - temporalis muscle, clavicle bone region - pectoralis major, deltoid, trapezius muscles, clavicle and acromion bone region - deltoid muscle, and scapular bone region - trapezius, supraspinus, infraspinus muscles  Fluid Accumulation: does not meet criteria  Edema: no edema present   Reduced  Strength: unable to obtain  A minimum of two characteristics is recommended for diagnosis of either severe or non-severe malnutrition.    Chart Review    Reason Seen: continuous nutrition monitoring and physician consult for eval/treat     Malnutrition Screening Tool Results                Diagnosis:  HIV, oral candidiasis, PNA, suspected PJP, sepsis, hyponatremia, hypophosphatemia, anemia     Relevant Medical History: none     Nutrition-Related Medications: IVF LR @ 100ml/hr, nystatin, zosyn, bactrim, vancomycin   Calorie Containing IV  "Medications: no significant kcals from medications at this time    Nutrition-Related Labs:  (1-19) H/H 6.7/20.7(L) Gluc 126(H) Bun 8.2 Cr 0.6 Alk Phos 215(H) K 3.1(L)     Diet/PN Order: Diet Neutropenic  Oral Supplement Order: none  Tube Feeding Order: none  Appetite/Oral Intake: poor/25-50% of meals  Factors Affecting Nutritional Intake: decreased appetite, nausea, sore mouth, and vomiting  Food/[employer]/Cultural Preferences:  chocolate boost   Food Allergies: none reported    Skin Integrity: abrasion  Wound(s):   as above     Comments    Received  consult for eval; pt reported has not been eating well approx month; had mouth/throat ulcers; currently on nystatin for candidiasis; pt with + reported wt loss--approx 35# over couple months; pt with + severe muscle/fat wasting; new Dx HIV. Will order oral supplement; also suggest megace to stimulate appetite; PPN recs included if pt unable to tolerate oral diet.     Anthropometrics    Height: 5' 6.14" (168 cm) Height Method: Stated  Last Weight: 50 kg (110 lb 3.7 oz) (23) Weight Method: Standard Scale  BMI (Calculated): 17.7  BMI Classification: underweight (BMI less than 18.5)        Ideal Body Weight (IBW), Male: 142.84 lb     % Ideal Body Weight, Male (lb): 77.17 %                 Usual Body Weight (UBW), k.9 kg (pt reported UBW 65.9kg; + wt loss --unsure exact timeframe--? couple months)  % Usual Body Weight: 76.03     Usual Weight Provided By: patient and EMR weight history    Wt Readings from Last 5 Encounters:   23 50 kg (110 lb 3.7 oz)   22 50.1 kg (110 lb 7.2 oz)     Weight Change(s) Since Admission:  Admit Weight: 50 kg (110 lb 3.7 oz) (23 112)  Pt stated #     Estimated Needs    Weight Used For Calorie Calculations: 50 kg (110 lb 3.7 oz)  Energy Calorie Requirements (kcal): 1750 kcal/d; 35 arpit/kg wt gain  Energy Need Method: Kcal/kg  Weight Used For Protein Calculations: 50 kg (110 lb 3.7 oz)  Protein Requirements: " 70 gm protein/d; 1.4 gm/kg  Fluid Requirements (mL): 1750ml/d; 1ml/arpit  Temp: 97.7 °F (36.5 °C)       Enteral Nutrition    Patient not receiving enteral nutrition at this time.    Parenteral Nutrition    Patient not receiving parenteral nutrition support at this time.    Evaluation of Received Nutrient Intake    Calories: not meeting estimated needs  Protein: not meeting estimated needs    Patient Education    Not applicable.    Nutrition Diagnosis     PES: Malnutrition related to new Dx HIV  as evidenced by eating < 50% meals several weeks; 45# reported wt loss; + severe muscle/fat wasting; BMI 17. . (new)    Interventions/Goals     Intervention(s): general/healthful diet, commercial beverage, multivitamin/mineral supplement therapy, prescription medication, and collaboration with other providers  Goal: Meet greater than 75% of nutritional needs by follow-up. (new)    Monitoring & Evaluation     Dietitian will monitor food and beverage intake, weight, and glucose/endocrine profile.  Nutrition Risk/Follow-Up: high (follow-up in 1-4 days)   Please consult if re-assessment needed sooner.

## 2023-01-20 LAB
AGE: 27
ALBUMIN SERPL-MCNC: 1.7 G/DL (ref 3.5–5)
ALBUMIN/GLOB SERPL: 0.3 RATIO (ref 1.1–2)
ALP SERPL-CCNC: 208 UNIT/L (ref 40–150)
ALT SERPL-CCNC: 13 UNIT/L (ref 0–55)
AST SERPL-CCNC: 20 UNIT/L (ref 5–34)
BACTERIA UR CULT: NORMAL
BASOPHILS # BLD AUTO: 0.01 X10(3)/MCL (ref 0–0.2)
BASOPHILS NFR BLD AUTO: 0.7 %
BILIRUBIN DIRECT+TOT PNL SERPL-MCNC: 0.3 MG/DL
BUN SERPL-MCNC: 6 MG/DL (ref 8.9–20.6)
C TRACH DNA SPEC QL NAA+PROBE: NOT DETECTED
CALCIUM SERPL-MCNC: 8 MG/DL (ref 8.4–10.2)
CD3+CD4+ CELLS # BLD: 29 % (ref 28–48)
CD3+CD4+ CELLS # SPEC: 7.97 UNIT/L (ref 589–1505)
CD3+CD4+ CELLS NFR BLD: 2.5 %
CHLORIDE SERPL-SCNC: 99 MMOL/L (ref 98–107)
CO2 SERPL-SCNC: 22 MMOL/L (ref 22–29)
CREAT SERPL-MCNC: 0.79 MG/DL (ref 0.73–1.18)
EOSINOPHIL # BLD AUTO: 0 X10(3)/MCL (ref 0–0.9)
EOSINOPHIL NFR BLD AUTO: 0 %
ERYTHROCYTE [DISTWIDTH] IN BLOOD BY AUTOMATED COUNT: 16.2 % (ref 11.5–17)
GFR SERPLBLD CREATININE-BSD FMLA CKD-EPI: >60 MLS/MIN/1.73/M2
GLOBULIN SER-MCNC: 5.3 GM/DL (ref 2.4–3.5)
GLUCOSE SERPL-MCNC: 124 MG/DL (ref 74–100)
HCT VFR BLD AUTO: 31.8 % (ref 42–52)
HGB BLD-MCNC: 10.6 GM/DL (ref 14–18)
HIV 2 AB SERPLBLD QL IA.RAPID: NEGATIVE
HIV1 AB SERPLBLD QL IA.RAPID: POSITIVE
HIV1 RNA # PLAS NAA DL=20: ABNORMAL COPIES/ML
IMM GRANULOCYTES # BLD AUTO: 0.02 X10(3)/MCL (ref 0–0.04)
IMM GRANULOCYTES NFR BLD AUTO: 1.3 %
LYMPHOCYTES # BLD AUTO: 0.22 X10(3)/MCL (ref 0.6–4.6)
LYMPHOCYTES # BLD AUTO: 319 X10(3)/MCL (ref 1260–5520)
LYMPHOCYTES NFR BLD AUTO: 14.6 %
LYMPHOMA - T-CELL MARKERS SPEC-IMP: ABNORMAL
MAGNESIUM SERPL-MCNC: 2 MG/DL (ref 1.6–2.6)
MCH RBC QN AUTO: 26.2 PG
MCHC RBC AUTO-ENTMCNC: 33.3 MG/DL (ref 33–36)
MCV RBC AUTO: 78.5 FL (ref 80–94)
MONOCYTES # BLD AUTO: 0.33 X10(3)/MCL (ref 0.1–1.3)
MONOCYTES NFR BLD AUTO: 21.9 %
N GONORRHOEA DNA SPEC QL NAA+PROBE: NOT DETECTED
NEUTROPHILS # BLD AUTO: 0.93 X10(3)/MCL (ref 2.1–9.2)
NEUTROPHILS NFR BLD AUTO: 61.5 %
NRBC BLD AUTO-RTO: 0 %
PATH REV: NORMAL
PHOSPHATE SERPL-MCNC: 2.1 MG/DL (ref 2.3–4.7)
PLATELET # BLD AUTO: 182 X10(3)/MCL (ref 130–400)
PMV BLD AUTO: 11.8 FL (ref 7.4–10.4)
POTASSIUM SERPL-SCNC: 2.9 MMOL/L (ref 3.5–5.1)
PROT SERPL-MCNC: 7 GM/DL (ref 6.4–8.3)
RBC # BLD AUTO: 4.05 X10(6)/MCL (ref 4.7–6.1)
SODIUM SERPL-SCNC: 132 MMOL/L (ref 136–145)
VANCOMYCIN TROUGH SERPL-MCNC: 13.6 UG/ML (ref 15–20)
WBC # BLD AUTO: 1100 /MM3 (ref 4500–11500)
WBC # SPEC AUTO: 1.5 X10(3)/MCL (ref 4.5–11.5)

## 2023-01-20 PROCEDURE — 25000003 PHARM REV CODE 250: Performed by: INTERNAL MEDICINE

## 2023-01-20 PROCEDURE — S0039 INJECTION, SULFAMETHOXAZOLE: HCPCS

## 2023-01-20 PROCEDURE — A4216 STERILE WATER/SALINE, 10 ML: HCPCS

## 2023-01-20 PROCEDURE — 86777 TOXOPLASMA ANTIBODY: CPT

## 2023-01-20 PROCEDURE — 63600175 PHARM REV CODE 636 W HCPCS: Performed by: STUDENT IN AN ORGANIZED HEALTH CARE EDUCATION/TRAINING PROGRAM

## 2023-01-20 PROCEDURE — 25000003 PHARM REV CODE 250

## 2023-01-20 PROCEDURE — 92610 EVALUATE SWALLOWING FUNCTION: CPT

## 2023-01-20 PROCEDURE — 84100 ASSAY OF PHOSPHORUS: CPT

## 2023-01-20 PROCEDURE — 63600175 PHARM REV CODE 636 W HCPCS

## 2023-01-20 PROCEDURE — 83735 ASSAY OF MAGNESIUM: CPT

## 2023-01-20 PROCEDURE — 85025 COMPLETE CBC W/AUTO DIFF WBC: CPT

## 2023-01-20 PROCEDURE — 94761 N-INVAS EAR/PLS OXIMETRY MLT: CPT

## 2023-01-20 PROCEDURE — 80202 ASSAY OF VANCOMYCIN: CPT

## 2023-01-20 PROCEDURE — 27000207 HC ISOLATION

## 2023-01-20 PROCEDURE — 36415 COLL VENOUS BLD VENIPUNCTURE: CPT

## 2023-01-20 PROCEDURE — 11000001 HC ACUTE MED/SURG PRIVATE ROOM

## 2023-01-20 PROCEDURE — 21400001 HC TELEMETRY ROOM

## 2023-01-20 PROCEDURE — 86778 TOXOPLASMA ANTIBODY IGM: CPT

## 2023-01-20 PROCEDURE — 97116 GAIT TRAINING THERAPY: CPT

## 2023-01-20 PROCEDURE — 80053 COMPREHEN METABOLIC PANEL: CPT

## 2023-01-20 PROCEDURE — 25000003 PHARM REV CODE 250: Performed by: FAMILY MEDICINE

## 2023-01-20 PROCEDURE — 63600175 PHARM REV CODE 636 W HCPCS: Performed by: INTERNAL MEDICINE

## 2023-01-20 PROCEDURE — 63600175 PHARM REV CODE 636 W HCPCS: Performed by: FAMILY MEDICINE

## 2023-01-20 RX ORDER — FLUCONAZOLE 2 MG/ML
200 INJECTION, SOLUTION INTRAVENOUS
Status: DISCONTINUED | OUTPATIENT
Start: 2023-01-21 | End: 2023-01-26 | Stop reason: HOSPADM

## 2023-01-20 RX ORDER — MEGESTROL ACETATE 40 MG/1
40 TABLET ORAL DAILY
Status: DISCONTINUED | OUTPATIENT
Start: 2023-01-20 | End: 2023-01-23

## 2023-01-20 RX ORDER — FLUCONAZOLE 2 MG/ML
400 INJECTION, SOLUTION INTRAVENOUS ONCE
Status: COMPLETED | OUTPATIENT
Start: 2023-01-20 | End: 2023-01-20

## 2023-01-20 RX ADMIN — VANCOMYCIN HYDROCHLORIDE 750 MG: 750 INJECTION, POWDER, LYOPHILIZED, FOR SOLUTION INTRAVENOUS at 04:01

## 2023-01-20 RX ADMIN — FLUCONAZOLE 400 MG: 2 INJECTION, SOLUTION INTRAVENOUS at 01:01

## 2023-01-20 RX ADMIN — SULFAMETHOXAZOLE AND TRIMETHOPRIM 333.44 MG: 80; 16 INJECTION INTRAVENOUS at 11:01

## 2023-01-20 RX ADMIN — PIPERACILLIN AND TAZOBACTAM 4.5 G: 4; .5 INJECTION, POWDER, LYOPHILIZED, FOR SOLUTION INTRAVENOUS; PARENTERAL at 11:01

## 2023-01-20 RX ADMIN — POTASSIUM BICARBONATE 50 MEQ: 978 TABLET, EFFERVESCENT ORAL at 08:01

## 2023-01-20 RX ADMIN — ENOXAPARIN SODIUM 40 MG: 40 INJECTION SUBCUTANEOUS at 05:01

## 2023-01-20 RX ADMIN — PIPERACILLIN AND TAZOBACTAM 4.5 G: 4; .5 INJECTION, POWDER, LYOPHILIZED, FOR SOLUTION INTRAVENOUS; PARENTERAL at 06:01

## 2023-01-20 RX ADMIN — NYSTATIN 500000 UNITS: 100000 SUSPENSION ORAL at 08:01

## 2023-01-20 RX ADMIN — SULFAMETHOXAZOLE AND TRIMETHOPRIM 333.44 MG: 80; 16 INJECTION INTRAVENOUS at 10:01

## 2023-01-20 RX ADMIN — MEGESTROL ACETATE 40 MG: 40 TABLET ORAL at 03:01

## 2023-01-20 RX ADMIN — SULFAMETHOXAZOLE AND TRIMETHOPRIM 333.44 MG: 80; 16 INJECTION INTRAVENOUS at 01:01

## 2023-01-20 RX ADMIN — ONDANSETRON 8 MG: 4 TABLET, ORALLY DISINTEGRATING ORAL at 02:01

## 2023-01-20 RX ADMIN — PIPERACILLIN AND TAZOBACTAM 4.5 G: 4; .5 INJECTION, POWDER, LYOPHILIZED, FOR SOLUTION INTRAVENOUS; PARENTERAL at 03:01

## 2023-01-20 RX ADMIN — Medication 10 ML: at 01:01

## 2023-01-20 RX ADMIN — VANCOMYCIN HYDROCHLORIDE 1000 MG: 500 INJECTION, POWDER, LYOPHILIZED, FOR SOLUTION INTRAVENOUS at 06:01

## 2023-01-20 NOTE — MEDICAL/APP STUDENT
[facility] Medicine Wards Progress Note     Resident Team: [facility] Medicine List 1  Attending Physician: Evelyn Kc MD  Resident: ***  Intern: ***       Subjective:      Brief HPI:  aHnh Hassan is a 27 y.o. male with no significant medical history who presented to [facility] ED on 2023  for blood work following recent ED visit in December.  In December, patient was seen in [facility] ED for sore throat, cough, and pain when swallowing solids and liquids. He was discharged and advised to get tested for HIV at the health unit, which he was unable to do so. During this time, he started experiencing weight loss, decreased appetite, nausea, vomiting, fatigue, and night sweats.  Within the last 2 weeks, patient reports fevers, rhinorrhea, and cough productive of white sputum.  Patient denies hemoptysis, chest pain, palpitations, abdominal pain, shortness of breath.  He denies constipation or diarrhea however reports occasional mucus and bright red blood in stools.  He also reports recent bilateral lower extremity weakness requiring assistance ambulating.  Lab significant for leukopenia WBC 1.3, normocytic anemia, hyponatremia, hypophosphatemia, elevated ALP, and positive HIV.  Chest x-ray revealed patchy bilateral lung infiltrates, with dense consolidations in right middle lobe.  In the ED, he was given 1.5 L of normal saline, Zosyn, and Vancomycin.  Internal Medicine was consulted for new HIV diagnosis and pneumonia.    Interval History: Overnight, nursing staff called to room x2 d/t pt feeling SOB and anxious, 2L NC initiated with some improvement of sx. Later, pt reported involuntary movement of his tongue and feeling like his throat was closing up. AFVSS. Pt feeling better this AM. Denies CP, diarrhea/constipation, f/c.       Review of Systems:  ROS completed and negative except as indicated above.     Objective:     Last 24 Hour Vital Signs:  BP  Min: 100/72  Max: 139/75  Temp  Av °F (36.7 °C)  Min: 97.6 °F (36.4 °C)   Max: 98.2 °F (36.8 °C)  Pulse  Av.3  Min: 63  Max: 102  Resp  Av.2  Min: 18  Max: 24  SpO2  Av.9 %  Min: 96 %  Max: 100 %  I/O last 3 completed shifts:  In: 3167.3 [P.O.:410; I.V.:966.7; Blood:419.6; IV Piggyback:1371]  Out: 550 [Urine:550]    Physical Examination:  General: cachectic, in no acute distress  Eye: PERRLA, EOMI, clear conjunctiva, eyelids normal  HENT: NC/AT, well-defined white plaques located on soft palate  Neck: full range of motion, no palpable lymphadenopathy  Respiratory: crackles in bilateral lung bases, respirations non-labored  Cardiovascular: regular rate and rhythm without murmurs, gallops or rubs  Gastrointestinal: soft, non-tender, non-distended with normal bowel sounds, without masses to palpation  Musculoskeletal: no obvious deformities, full range of motion of all extremities  Integumentary: hypopigmented patches located on face/scalp, patchy hair loss  Extremities: no LE edema  Neurologic: no signs of peripheral neurological deficit, motor/sensory function intact    Laboratory:  Most Recent Data:  CBC:   Lab Results   Component Value Date    WBC 1.5 (LL) 2023    HGB 10.6 (L) 2023    HCT 31.8 (L) 2023     2023    MCV 78.5 (L) 2023    RDW 16.2 2023     WBC Differential:   Recent Labs   Lab 23  1216 23  1804 23  0251 23  1811 23  0323   WBC 1.3* 1.1* 1.1* 2.0* 1.5*   HGB 8.3* 7.2* 6.7* 11.0* 10.6*   HCT 26.2* 22.9* 20.7* 32.7* 31.8*    220 192 199 182   MCV 80.4 80.4 78.7* 79.2* 78.5*     BMP:   Lab Results   Component Value Date     (L) 2023    K 2.9 (L) 2023    CO2 22 2023    BUN 6.0 (L) 2023    CREATININE 0.79 2023    CALCIUM 8.0 (L) 2023    MG 2.00 2023    PHOS 2.1 (L) 2023     LFTs:   Lab Results   Component Value Date    ALBUMIN 1.7 (L) 2023    BILITOT 0.3 2023    AST 20 2023    ALKPHOS 208 (H) 2023    ALT 13  01/20/2023     Coags: No results found for: INR, PROTIME, PTT  FLP:   Lab Results   Component Value Date    CHOL 56 01/19/2023    HDL 11 (L) 01/19/2023    TRIG 72 01/19/2023     DM:   Lab Results   Component Value Date    HGBA1C 5.5 01/18/2023    CREATININE 0.79 01/20/2023     Thyroid:   Lab Results   Component Value Date    TSH 1.435 01/18/2023     Anemia:   Lab Results   Component Value Date    IRON 7 (L) 01/18/2023    TIBC 125 (L) 01/18/2023    FERRITIN 2,325.58 (H) 01/18/2023     Cardiac:   Lab Results   Component Value Date    TROPONINI <0.010 01/18/2023    BNP 51.8 01/18/2023       Microbiology Data Reviewed: yes  Pertinent Findings:  Microbiology Results (last 7 days)       Procedure Component Value Units Date/Time    Chlamydia/GC, PCR [[id number]]  (Normal) Collected: 01/19/23 2235    Order Status: Completed Specimen: Urine Updated: 01/20/23 0215     Chlamydia trachomatis PCR Not Detected     N. gonorrhea PCR Not Detected    Narrative:      The Xpert CT/NG test, performed on the GeneXpert system is a qualitative in vitro real-time polymerase chain reaction (PCR) test for the automated detected and differentiation for genomic DNA from Chlamydia trachomatis (CT) and/or Neisseria gonorrhoeae (NG).    Blood Culture [[id number]]  (Normal) Collected: 01/18/23 1425    Order Status: Completed Specimen: Blood from Forearm, Right Updated: 01/19/23 1901     CULTURE, BLOOD ([facility]) No Growth At 24 Hours    Blood Culture [[id number]]  (Normal) Collected: 01/18/23 1402    Order Status: Completed Specimen: Blood from Arm, Right Updated: 01/19/23 1901     CULTURE, BLOOD ([facility]) No Growth At 24 Hours    Respiratory Culture [[id number]] Collected: 01/18/23 1608    Order Status: Completed Specimen: Sputum, Expectorated Updated: 01/19/23 0709     Respiratory Culture No Growth At 24 Hours    Urine culture [[id number]] Collected: 01/18/23 1232    Order Status: Completed Specimen: Urine Updated: 01/19/23 0641     Urine Culture No Growth At  24 Hours    Cryptococcal antigen, blood [[id number]] Collected: 01/18/23 1916    Order Status: Completed Specimen: Blood, Venous Updated: 01/18/23 1937     CRYPTOCOCCAL ANTIGEN, SERUM ([facility]) Negative     CRYPTOCOCCAL ANTIGEN TITER ([facility]) --    Blood Culture (site 1) [[id number]]     Order Status: Canceled Specimen: Blood     Blood Culture (site 2) [[id number]]     Order Status: Canceled Specimen: Blood     Stool Culture [[id number]]     Order Status: Sent Specimen: Stool              Other Results:  EKG (my interpretation): unchanged from previous tracings.    Radiology:  Imaging Results              CT Abdomen Pelvis With Contrast (Final result)  Result time 01/18/23 14:34:11      Final result by Amairani Cruz MD (01/18/23 14:34:11)                   Impression:      1. Findings concerning for multifocal pneumonia in the lung bases.  2. No acute abnormality of the abdomen or pelvis.      Electronically signed by: Amairani Cruz  Date:    01/18/2023  Time:    14:34               Narrative:    EXAMINATION:  CT ABDOMEN PELVIS WITH CONTRAST    CLINICAL HISTORY:  Flank pain, kidney stone suspected;    TECHNIQUE:  CT imaging was performed of the abdomen and pelvis after the administration of intravenous contrast. Dose length product is 157 mGycm. Automatic exposure control, adjustment of mA/kV or iterative reconstruction technique was used to limit radiation dose.    COMPARISON:  None    FINDINGS:  Liver: Normal.    Gallbladder and biliary tree: No calcified gallstones. No intra or extrahepatic biliary ductal dilation.    Pancreas: Normal.    Spleen: Normal.    Adrenals: Normal.    Kidneys and ureters: There is no obstructing urinary calculus.  There is no hydronephrosis.    Bladder: Normal.    Reproductive organs: No pelvic masses.    Stomach/bowel: No evidence of bowel obstruction. The appendix is not clearly identified.  No discernible bowel inflammation.    Lymph nodes: No pathologically enlarged lymph node  identified.    Peritoneum: No ascites or free air. No fluid collection.    Vessels: No abdominal aortic aneurysm.    Abdominal wall: Normal.    Lung bases: There are consolidative changes in the right lower and middle lobes with additional scattered bibasilar airspace opacities.    Bones: No acute osseous findings.                                       X-Ray Chest PA And Lateral (Final result)  Result time 01/18/23 13:59:35      Final result by Tom Quezada MD (01/18/23 13:59:35)                   Impression:      Bilateral lungs infiltrates.      Electronically signed by: Tom Quezada  Date:    01/18/2023  Time:    13:59               Narrative:    EXAMINATION:  XR CHEST PA AND LATERAL    CLINICAL HISTORY:  SV;    TECHNIQUE:  Two views    COMPARISON:  December 6, 2022.    FINDINGS:  Cardiopericardial silhouette is within normal limits.  There is dense consolidation which involves the right middle lung lobe.  There are additional patchy infiltrates which involve the right lower lung lobe and to a lesser degree the left lower lung lobe.  No pulmonary edema or pneumothorax.  No fluid accumulation within the pleural spaces.                        ED Interpretation by Delfino Mishra MD (01/18/23 13:56:26, [facility], Emergency Medicine)    Signs of pneumonia in the right lower lobe.  No cardiomegaly or pneumothorax                                    Current Medications:     Infusions:       Scheduled:   enoxaparin  40 mg Subcutaneous Daily    nystatin  500,000 Units Oral QID    piperacillin-tazobactam (ZOSYN) IVPB  4.5 g Intravenous Q8H    potassium bicarbonate  50 mEq Oral Once    potassium bicarbonate  50 mEq Oral Once    trimethoprim-sulfamethoxasole (BACTRIM) IVPB (for non fluid restricted pts) (fixed ratio)  20 mg/kg/day Intravenous Q8H    vancomycin (VANCOCIN) IVPB  750 mg Intravenous Q12H        PRN:  sodium chloride, acetaminophen, dextrose 10%, dextrose 10%, glucagon (human  recombinant), glucose, glucose, naloxone, ondansetron, sodium chloride 0.9%, Pharmacy to dose Vancomycin consult **AND** vancomycin - pharmacy to dose    Antibiotics and Day Number of Therapy:  Vancomycin Day 2  Zosyn Day 2  Bactrim Day 2    Lines and Day Number of Therapy:  PIV    Assessment & Plan:     HIV  Oral candidiasis  - HIV 1/2 Ag/Ab reactive   - Hepatitis panel negative, Cryptococcus negative   - Syphilis Ab reactive, RPR reactive. 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  - CD4 count, HIV PCR, HIV 1/2 Antibody Diff pending  - Changed to IV fluconazole 200mg QD from nystatin suspension 100 mg qid for possible esophagitis b/c pt has difficulty swallowing  - Ordered SLP swallow study  - Will consider opportunistic infection prophylaxis pending CD4 count     CAP  Suspected PJP  Sepsis  - SIRS 3/4: leukopenia, tachycardia, fever with respiratory source   - CXR revealed patchy bilateral infiltrates with dense consolidation in right middle lobe   - COVID/FLU PCR negative  - Respiratory cultures no growth at 24 hrs   - Blood cultures no growth at 24 hrs  - PJP PCR, Fungitell pending   - WBC 1.5 this AM  - Neutropenic precautions   - Continue broad spectrum abx with IV Zosyn and IV vancomycin  - Continue IV Bactrim for suspected PJP infection   - Stopped IVF   - Continue 2L NC    Hyponatremia   Hypophosphatemia  Hypokalemia  - Na 132 this AM   - Phos 2.1 this AM, decreased from 3.2  - K 2.9 this AM from 3.1  - Serum osmolality 272, urine osmolality wnl, urine Na <20  - Stool studies, Legionella, Giardia/Cryptosporidium pending    - Repleted with potassium bicarb 50 mEq x2, potassium chloride 30 mEq x1 this AM  - Replete with NaPhos?  - Will continue to monitor and replete as necessary    Microcytic anemia  AoCD   - H/H of 11/32.7 from 6.7/20.7 post-transfusion 2U pRBCs  - H/H 10.6/31.8 this AM  - MCV 78.5 this AM  - Plt 182 this AM from 220 at time of admission  - Fe 7, Ferritin 2325.58, Transferrin 109,  TIBC 125, Fe Sat 6  - FOBT pending  - Continue to monitor H/H for further drop and need for intervention     Malnutrition   - Nutrition consulted  - Continue neutropenic diet and encourage oral intake   - Continue chocolate boost TID   - Consider megace to stimulate appetite    CODE STATUS: FULL   Access: PIV   Antibiotics: zosyn, vancomycin, bactrim   Diet: neutropenic  DVT Prophylaxis: lovenox   GI Prophylaxis: N/A  Fluids: N/A    Disposition: Continue IV abx for CAP and presumed PJP. F/u infectious disease work-up for newly-diagnosed HIV.    Phoenix Hwaung, MS4  [facility]-[employer] Medical Student

## 2023-01-20 NOTE — PROGRESS NOTES
[facility] Medicine Wards Progress Note     Resident Team: [facility] Medicine List 1  Attending Physician: Evelyn Kc MD  Resident: Will   Intern: Hema        Subjective:      Brief HPI:  Hanh Hassan is a 27 y.o. male with no significant medical history who presented to [facility] ED on 1/18/2023  for blood work following recent ED visit in December.  In December, patient was seen in [facility] ED for sore throat, cough, and pain when swallowing solids and liquids. He was discharged and advised to get tested for HIV at the health unit, which he was unable to do so. During this time, he started experiencing weight loss, decreased appetite, nausea, vomiting, fatigue, and night sweats.  Within the last 2 weeks, patient reports fevers, rhinorrhea, and cough productive of white sputum.  Patient denies hemoptysis, chest pain, palpitations, abdominal pain, shortness of breath.  He denies constipation or diarrhea however reports occasional mucus and bright red blood in stools.  He also reports recent bilateral lower extremity weakness requiring assistance ambulating.  Lab significant for leukopenia WBC 1.3, normocytic anemia, hyponatremia, hypophosphatemia, elevated ALP, and positive HIV.  Chest x-ray revealed patchy bilateral lung infiltrates, with dense consolidations in right middle lobe.  In the ED, he was given 1.5 L of normal saline, Zosyn, and Vancomycin.  Internal Medicine was consulted for new HIV diagnosis and pneumonia.    Interval History: Overnight, nursing staff called to room x2 d/t pt feeling SOB and anxious, 2L NC initiated with some improvement of sx. Later, pt reported involuntary movement of his tongue and feeling like his throat was closing up. AFVSS. Pt feeling better this AM. Denies fever, chills, weakness, fatigue, CP, palpitations, SOB, diarrhea/constipation, but admits to nausea.     Review of Systems:  ROS completed and negative except as indicated above.     Objective:     Last 24 Hour Vital Signs:  BP   Min: 101/82  Max: 133/81  Temp  Av °F (36.7 °C)  Min: 97.5 °F (36.4 °C)  Max: 98.2 °F (36.8 °C)  Pulse  Av.8  Min: 63  Max: 98  Resp  Av.4  Min: 18  Max: 24  SpO2  Av.9 %  Min: 96 %  Max: 100 %  I/O last 3 completed shifts:  In: 3167.3 [P.O.:410; I.V.:966.7; Blood:419.6; IV Piggyback:1371]  Out: 550 [Urine:550]    Physical Examination:  General: Patient resting comfortably in bed, ill-appearing, cachectic   Eye: PERRLA, EOMI, clear conjunctiva, eyelids normal  HENT: Head-normocephalic and atraumatic. Oral well defined white plaques located on soft palate.   Neck: full range of motion, no thyromegaly or lymphadenopathy, trachea midline, supple, no palpable thyroid nodules  Respiratory: crackles in bilateral lung bases, without wheezing, rales, rhonchi   Cardiovascular: tachycardic and regular rhythm without murmurs.  No gallops or rubs no JVD.  Capillary refill within normal limits.  Gastrointestinal: soft, non-tender, non-distended with normal bowel sounds, without masses to palpation  Genitourinary: no CVA tenderness to palpation  Musculoskeletal: full range of motion of all extremities/spine without limitation or discomfort  Integumentary: hypopigmented patches located on face/scalp. Diffuse non-scarring hair loss.   Neurologic: no signs of peripheral neurological deficit, motor/sensory function intact  Psychiatric:  alert and oriented, cognitive function intact, cooperative with exam, good eye contact, judgement and insight altered, mood and affect flat.     Laboratory:  Most Recent Data:  CBC:   Lab Results   Component Value Date    WBC 1.5 (LL) 2023    HGB 10.6 (L) 2023    HCT 31.8 (L) 2023     2023    MCV 78.5 (L) 2023    RDW 16.2 2023     WBC Differential:   Recent Labs   Lab 23  1216 23  1804 23  0251 23  1811 23  0323   WBC 1.3* 1.1* 1.1* 2.0* 1.5*   HGB 8.3* 7.2* 6.7* 11.0* 10.6*   HCT 26.2* 22.9* 20.7* 32.7*  31.8*    220 192 199 182   MCV 80.4 80.4 78.7* 79.2* 78.5*     BMP:   Lab Results   Component Value Date     (L) 01/20/2023    K 2.9 (L) 01/20/2023    CO2 22 01/20/2023    BUN 6.0 (L) 01/20/2023    CREATININE 0.79 01/20/2023    CALCIUM 8.0 (L) 01/20/2023    MG 2.00 01/20/2023    PHOS 2.1 (L) 01/20/2023     LFTs:   Lab Results   Component Value Date    ALBUMIN 1.7 (L) 01/20/2023    BILITOT 0.3 01/20/2023    AST 20 01/20/2023    ALKPHOS 208 (H) 01/20/2023    ALT 13 01/20/2023     Coags: No results found for: INR, PROTIME, PTT  FLP:   Lab Results   Component Value Date    CHOL 56 01/19/2023    HDL 11 (L) 01/19/2023    TRIG 72 01/19/2023     DM:   Lab Results   Component Value Date    HGBA1C 5.5 01/18/2023    CREATININE 0.79 01/20/2023     Thyroid:   Lab Results   Component Value Date    TSH 1.435 01/18/2023      Anemia:   Lab Results   Component Value Date    IRON 7 (L) 01/18/2023    TIBC 125 (L) 01/18/2023    FERRITIN 2,325.58 (H) 01/18/2023     No results found for: [id number]  No results found for: FOLATE     Cardiac:   Lab Results   Component Value Date    TROPONINI <0.010 01/18/2023    BNP 51.8 01/18/2023         Microbiology Data:  Microbiology Results (last 7 days)       Procedure Component Value Units Date/Time    Respiratory Culture [[id number]]  (Abnormal) Collected: 01/18/23 1608    Order Status: Completed Specimen: Sputum, Expectorated Updated: 01/20/23 0759     Respiratory Culture Moderate Yeast     Comment: with normal respiratory shree        GRAM STAIN Quality 1+      Many Gram positive cocci      Many Gram Negative Rods      Many Gram Positive Rods      Few Yeast    Urine culture [[id number]] Collected: 01/18/23 1232    Order Status: Completed Specimen: Urine Updated: 01/20/23 0719     Urine Culture No Significant Growth    Chlamydia/GC, PCR [[id number]]  (Normal) Collected: 01/19/23 2235    Order Status: Completed Specimen: Urine Updated: 01/20/23 0215     Chlamydia trachomatis PCR Not  Detected     N. gonorrhea PCR Not Detected    Narrative:      The Xpert CT/NG test, performed on the GeneXpert system is a qualitative in vitro real-time polymerase chain reaction (PCR) test for the automated detected and differentiation for genomic DNA from Chlamydia trachomatis (CT) and/or Neisseria gonorrhoeae (NG).    Blood Culture [[id number]]  (Normal) Collected: 01/18/23 1425    Order Status: Completed Specimen: Blood from Forearm, Right Updated: 01/19/23 1901     CULTURE, BLOOD ([facility]) No Growth At 24 Hours    Blood Culture [[id number]]  (Normal) Collected: 01/18/23 1402    Order Status: Completed Specimen: Blood from Arm, Right Updated: 01/19/23 1901     CULTURE, BLOOD ([facility]) No Growth At 24 Hours    Cryptococcal antigen, blood [[id number]] Collected: 01/18/23 1916    Order Status: Completed Specimen: Blood, Venous Updated: 01/18/23 1937     CRYPTOCOCCAL ANTIGEN, SERUM ([facility]) Negative     CRYPTOCOCCAL ANTIGEN TITER ([facility]) --    Blood Culture (site 1) [[id number]]     Order Status: Canceled Specimen: Blood     Blood Culture (site 2) [[id number]]     Order Status: Canceled Specimen: Blood     Stool Culture [[id number]]     Order Status: Sent Specimen: Stool              Other Results:  EKG (my interpretation): unchanged from previous tracings    Radiology:  Imaging Results              CT Abdomen Pelvis With Contrast (Final result)  Result time 01/18/23 14:34:11      Final result by Amairani Cruz MD (01/18/23 14:34:11)                   Impression:      1. Findings concerning for multifocal pneumonia in the lung bases.  2. No acute abnormality of the abdomen or pelvis.      Electronically signed by: Amairani Cruz  Date:    01/18/2023  Time:    14:34               Narrative:    EXAMINATION:  CT ABDOMEN PELVIS WITH CONTRAST    CLINICAL HISTORY:  Flank pain, kidney stone suspected;    TECHNIQUE:  CT imaging was performed of the abdomen and pelvis after the administration of intravenous contrast. Dose length  product is 157 mGycm. Automatic exposure control, adjustment of mA/kV or iterative reconstruction technique was used to limit radiation dose.    COMPARISON:  None    FINDINGS:  Liver: Normal.    Gallbladder and biliary tree: No calcified gallstones. No intra or extrahepatic biliary ductal dilation.    Pancreas: Normal.    Spleen: Normal.    Adrenals: Normal.    Kidneys and ureters: There is no obstructing urinary calculus.  There is no hydronephrosis.    Bladder: Normal.    Reproductive organs: No pelvic masses.    Stomach/bowel: No evidence of bowel obstruction. The appendix is not clearly identified.  No discernible bowel inflammation.    Lymph nodes: No pathologically enlarged lymph node identified.    Peritoneum: No ascites or free air. No fluid collection.    Vessels: No abdominal aortic aneurysm.    Abdominal wall: Normal.    Lung bases: There are consolidative changes in the right lower and middle lobes with additional scattered bibasilar airspace opacities.    Bones: No acute osseous findings.                                       X-Ray Chest PA And Lateral (Final result)  Result time 01/18/23 13:59:35      Final result by Tom Quezada MD (01/18/23 13:59:35)                   Impression:      Bilateral lungs infiltrates.      Electronically signed by: Tom Quezada  Date:    01/18/2023  Time:    13:59               Narrative:    EXAMINATION:  XR CHEST PA AND LATERAL    CLINICAL HISTORY:  SV;    TECHNIQUE:  Two views    COMPARISON:  December 6, 2022.    FINDINGS:  Cardiopericardial silhouette is within normal limits.  There is dense consolidation which involves the right middle lung lobe.  There are additional patchy infiltrates which involve the right lower lung lobe and to a lesser degree the left lower lung lobe.  No pulmonary edema or pneumothorax.  No fluid accumulation within the pleural spaces.                        ED Interpretation by Delfino Mishra MD (01/18/23 13:56:26, [facility]  - Emergency Dept, Emergency Medicine)    Signs of pneumonia in the right lower lobe.  No cardiomegaly or pneumothorax                                    Current Medications:     Infusions:       Scheduled:   enoxaparin  40 mg Subcutaneous Daily    [START ON 1/21/2023] fluconazole (DIFLUCAN) IV (PEDS and ADULTS)  200 mg Intravenous Q24H    fluconazole (DIFLUCAN) IV (PEDS and ADULTS)  400 mg Intravenous Once    piperacillin-tazobactam (ZOSYN) IVPB  4.5 g Intravenous Q8H    trimethoprim-sulfamethoxasole (BACTRIM) IVPB (for non fluid restricted pts) (fixed ratio)  20 mg/kg/day Intravenous Q8H    vancomycin (VANCOCIN) IVPB  750 mg Intravenous Q12H        PRN:  sodium chloride, acetaminophen, dextrose 10%, dextrose 10%, glucagon (human recombinant), glucose, glucose, naloxone, ondansetron, sodium chloride 0.9%, Pharmacy to dose Vancomycin consult **AND** vancomycin - pharmacy to dose    Antibiotics and Day Number of Therapy:  Vanc, Zosyn day 2  Bactrim day 2    Lines and Day Number of Therapy:  PIV    Assessment & Plan:   HIV   Oral candidiasis   - HIV 1/2 Ag/Ab reactive   - CD4 count, HIV PCR, HIV 1/2 Antibody Diff pending  - PJP PCR, fungitell pending   - Hepatitis panel negative, Cryptococcus negative  - Gonorrhea/Chlamydia negative   - Syphilis Ab reactive, RPR reactive. Treated at [facility] with single dose penicillin G for secondary syphilis on 12/19/21. Contacted Infectious disease and confirmed completed treatment for secondary syphilis on 12/2021. 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   Sepsis   - SIRS 3/4: leukopenia,  tachycardia, fever with respiratory source   - Chest xray revealed patchy bilateral infiltrates with dense consolidation  in right middle lobe   - COVID/FLU PCR negative  - WBC increased from 1.1 yesterday to 1.5 today   - Neutropenic precautions   - Continue broad spectrum abx with IV Zosyn and Vancomycin and Bactrim for suspected PJP infection   - Continue IVF with  mL/hr   - Blood cultures pending   - Respiratory cultures no growth at 24 hrs   - PJP PCR, Fungitell pending      Hyponatremia   Hypophosphatemia   - Sodium 132 and phos 2.1 this AM; repleted this AM  - Serum osmolality 272, urine osmolality wnl, urine Na <20  - Stool studies, Legionella,Giardia/Cryptosporidium pending    - Will continue to monitor and replete as necessary      Normocytic anemia   - H/H of 10.6/31.8 today from 6.7/20.7 on 01/19 after receiving 2 units of pRBC on 01/19/2023  - Ordered FOBT given hx of hematochezia; patient still has not had bowel movement  - Continue to monitor H/H for further drop and need for intervention    Malnutrition   - Consulted Nutrition  - Continue neutropenic diet and encourage oral intake   - ordered chocolate boost TID   - ordered megace 40 mg qd to stimulate appetite per recommendation       CODE STATUS: FULL   Access: PIV   Antibiotics: zosyn, vancomycin, bactrim   Diet: regular   DVT Prophylaxis: lovenox   GI Prophylaxis:   Fluids:  ml/hr       Disposition: Continue IV abx for CAP coverage and presumed PJP. Continue infectious disease workup for newly diagnosed HIV.       Christiano Sosa MD     [facility] Medicine Resident HO-1  01/21/2023

## 2023-01-20 NOTE — PT/OT/SLP EVAL
"[facility]  Cranial Nerve Exam and Clinical Swallow Exam  Initial    Name: Hanh Hassan   MRN: [id number]    Therapy Diagnosis: dysphagia    Physician: Jeannette Liriano MD    Date of Evaluation:  01/20/2023    SLP Treatment Date:   01/20/23  Speech Start Time:  1130  Speech Stop Time:  1145     Speech Total Time (min):  15 min    Billable Minutes: Eval 15       Procedure Min.   Swallow and Oral Function Evaluation   15     Chief Complaint: Weakness      Active Ambulatory Problems     Diagnosis Date Noted    No Active Ambulatory Problems     Resolved Ambulatory Problems     Diagnosis Date Noted    No Resolved Ambulatory Problems     No Additional Past Medical History      Per medical record: "Hanh Hassan is a 27 y.o. male with no significant medical history who presented to [facility] ED on 1/18/2023  for blood work following recent ED visit in December.  In December, patient was seen in [facility] ED for sore throat, cough, and pain when swallowing solids and liquids. He was discharged and advised to get tested for HIV at the health unit, which he was unable to do so. During this time, he started experiencing weight loss, decreased appetite, nausea, vomiting, fatigue, and night sweats.  Within the last 2 weeks, patient reports fevers, rhinorrhea, and cough productive of white sputum. " Speech pathology consulted to assess swallow via clinical swallow evaluation.    Imaging:  CXR: 1/18/23  Impression:     Bilateral lungs infiltrates.         General Information:  Affect: Alert  Cooperative  Lethargic  Oxygen:  room air  Hearing: WFL  Orientation: Ox4    Objective:       Cranial Nerve 5: Trigeminal Nerve  Motor Jaw Posture at rest: Closed  Mandible Elevation/Depression: WFL  Mandible lateralization: Unable to lateralize right  Abnormal movement: absent Interpretation:   intact   Sensory Forehead: WFL  Cheek: WFL  Jaw: WFL  Facial Pain: None noted Interpretation:   intact     Cranial Nerve 7: Facial " Nerve  Motor Facial Symmetry: WNL  Wrinkle Forehead: WFL  Close eyes tightly: WFL  Labial Protrusion: WFL  Labial Retraction: WFL  Buccal Strength with Labial Seal: WFL  Abnormal movement: absent Interpretation:   intact   Sensory Formal testing not completed. Patient denied any changes in taste      Cranial Nerves IX and X: Glossopharyngeal and Vagus Nerves  Motor Palatal Symmetry (Rest): WNL  Palatal Symmetry (Movement): WNL  Cough: Perceptually weak  Voice Prior to PO intake: Clear  Resonance: Normal  Abnormal movement: absent Interpretation:   intact     Cranial Nerve XII: Hypoglossal Nerve  Motor Tongue at rest: WNL  Lingual Protrusion: WNL  Lingual Protrusion against Resistance: WNL  Lingual Lateralization: WNL  Abnormal movement: absent Interpretation:   intact     Other information:  Volitional Swallow: Able to palpate laryngeal rise  Mucosal Quality: No abnormal findings  Secretion Management: Secretion Mgmt: adequate  Dentition: Good condition for speech and mastication     Predisposing dysphagia risk factors: reports of swallowing difficulties  Clinical signs of possible chronic dysphagia: suspect esophageal dysphagia  Precipitating dysphagia risk factors:     Pain:   0/10  Pain Location / Description: n/a    Assessment :     PO Trials:   Patient presented with:  ICE CHIPS: self administered  THIN:-self regulated thin liquid via straw  PUREE: DNT  MINCED AND MOIST: DNT  SOFT AND BITE SIZED: DNT  SOLID: DNT        Limitations: n/a    IMPRESSION:   Patient's assessment today limited to water only as patient began regurgitating a yellowish liquid. RN (Ines) aware and attended to patient at bedside. No further PO trails administered. Strict aspiration precautions recommended. SLP to f/u for diet tolerance and safety.    Education:  Aspiration precautions and Recommendations     Barriers to Learning:     Teaching Method: Verbal    Goals:     LONG TERM GOAL    1.Javien Hassan will tolerate Regular  consistency diet with thin liquids without overt s/sx of aspiration to maintain appropriate nutrition and hydration.  Initial   2. Javien Hassan will participate in and MBSS to assess swallow effectiveness, ID/rule out penetration and aspiration, make appropriate recommendations regarding safest diet consistency, effective compensatory strategies and safe eating environment. monitor       Recommendations:     Consistency Recommendations:  Thin liquids (IDDSI 0) and Regualr consistencies (IDDSI 7).  Medications should be taken Whole in thin liquids.   Precautions:supervision recommended, sit as upright as possible, upright 30 minutes after meals, and alternate food and liquid  Risk Management: use good oral hygiene , sit upright for all PO intake, and increase physical mobility as tolerated  Specialist Referrals: If vomiting continues consider GI consult  Ancillary Tests: monitor for MBSS  Therapy: Dysphagia therapy is recommended including TBD.  Frequency:  1-5 days a week  Follow-up exam: Follow up swallow study is not indicated at this time.    The Functional Oral Intake Scale (FOIS) is an ordinal scale that is used to assess the current status and meaningful change in the oral intake. FOIS levels include:        TUBE DEPENDENT   (Levels 1-3) 1. No oral intake    2. Tube dependent with minimal/inconsistent oral intake    3. Tube supplements with consistent oral intake          TOTAL ORAL INTAKE (Levels 4-7) 4. Total oral intake of a single consistency    5. Total oral intake of multiple consistencies requiring special preparation    6. Total oral intake with no special preparation, but must avoid specific foods or liquid items    7. Total oral intake with no restrictions   Patient is currently judged to be at FOIS Level 6     Learners: Patient/family, RN (Ines), were educated on the results and recommendations of this evaluation. All expressed understanding. Patient will benefit from ongoing education.      *If  this is the last documented treatment note, then it will signify discharge from acute care prior to discharge from speech services and will serve as the discharge summary.*     Reference:   American Speech-Language-Hearing Association. (n.d.b). Adult dysphagia. (Practice Portal). https://www.airam.org/practice-portal/clinical-topics/adult-dysphagia/  GEETHA Perez. (2018). Use of modified diets to prevent aspiration in oropharyngeal dysphagia: Is current practice justified?. BMC Geriatrics, 18(1), 1-10.  GEETHA Downs., JAE Lee, KAMRAN White, & MELISSA Li. (2002). Predictors of aspiration pneumonia in nursing home residents.  Dysphagia, 17(4), 298-307.  JAE Tavarez (2016). Best practices for dehydration prevention. Perspectives of the AIRAM Special Interest Groups - SIG 13, 1(2), 72- 80.        López Ernst M.S. [facility]-SLP  [facility]

## 2023-01-20 NOTE — PT/OT/SLP PROGRESS
Physical Therapy Treatment    Patient Name:  Hanh Hassan   MRN:  [id number]    Recommendations:     Discharge Recommendations:  home health PT   Discharge Equipment Recommendations: walker, rolling   Barriers to discharge: None    Assessment:     Hanh Hassan is a 27 y.o. male admitted with a medical diagnosis of   1. HIV infection, unspecified symptom status    2. Chest pain    3. Pneumonia due to infectious organism, unspecified laterality, unspecified part of lung    4. Normocytic anemia        He presents with the following impairments/functional limitations:  weakness, impaired endurance, impaired self care skills, impaired functional mobility, gait instability, impaired balance, decreased lower extremity function .    Rehab Prognosis: Good; patient would benefit from acute skilled PT services to address these deficits and reach maximum level of function.    Recent Surgery: * No surgery found *      Plan:     During this hospitalization, patient to be seen  (3-5 times per week) to address the identified rehab impairments via gait training, therapeutic activities, therapeutic exercises and progress toward the following goals:    Plan of Care Expires:  02/16/23    Subjective     Chief Complaint: Aching in legs during gait.  Patient/Family Comments/goals: Get better.  Pain/Comfort:  Pain Rating 1: 0/10  Pain Rating Post-Intervention 1: 0/10      Objective:     Communicated with nurse (Ines) prior to session.  Patient found supine with peripheral IV upon PT entry to room.     General Precautions: Standard, fall, neutropenic   Orthopedic Precautions:N/A   Braces: N/A  Respiratory Status: Room air     Functional Mobility:  Bed Mobility:     Rolling Left:  stand by assistance  Rolling Right: stand by assistance  Scooting: stand by assistance  Supine to Sit: contact guard assistance  Sit to Supine: contact guard assistance  Transfers:     Sit to Stand:  minimum assistance with rolling walker  Gait: x130 feet with  RW and Min A; unsteady          Therapeutic Activities and Exercises:   Pt. Declined ther ex.    Patient left supine with all lines intact, call button in reach, nurse notified, and friend present..    GOALS:   Multidisciplinary Problems       Physical Therapy Goals          Problem: Physical Therapy    Goal Priority Disciplines Outcome Goal Variances Interventions   Physical Therapy Goal     PT, PT/OT Ongoing, Progressing     Description: Goals to be met by: Discharge     Patient will increase functional independence with mobility by performing:    -. Supine to sit with Modified [place]-ONGOING  -. Sit to supine with Modified [place]-ONGOING  -. Sit to stand transfer with Supervision -ONGOING  -. Gait  x 260 feet with Stand-by Assistance using Rolling Walker. -ONGOING                         Time Tracking:     PT Received On: 01/20/23  PT Start Time: 1103     PT Stop Time: 1116  PT Total Time (min): 13 min     Billable Minutes: Gait Training 13 minutes    Treatment Type: Treatment  PT/PTA: PT     PTA Visit Number: 0     01/20/2023

## 2023-01-20 NOTE — PLAN OF CARE
01/20/23 1542   Discharge Assessment   Assessment Type Discharge Planning Assessment   Confirmed/corrected address, phone number and insurance Yes   Confirmed Demographics Correct on Facesheet   Source of Information patient   When was your last doctors appointment?   (Evelyn Kc)   Reason For Admission Fatigue, generalized weakness   People in Home parent(s)   Do you expect to return to your current living situation? Yes   Do you have help at home or someone to help you manage your care at home? Yes   Who are your caregiver(s) and their phone number(s)? Erlinda Hassan [phone]   Prior to hospitilization cognitive status: Alert/Oriented   Current cognitive status: Alert/Oriented   Equipment Currently Used at Home none   Patient currently being followed by outpatient case management? No   Do you currently have service(s) that help you manage your care at home? No   Do you take prescription medications? Yes   Do you have prescription coverage? Yes   Coverage MEDICA[facility] - [place] [facility]   Do you have any problems affording any of your prescribed medications? No   Is the patient taking medications as prescribed? yes   Who is going to help you get home at discharge? Family   How do you get to doctors appointments? family or friend will provide   Are you on dialysis? No   Do you take coumadin? No   Discharge Plan A Home   DME Needed Upon Discharge  none   Discharge Plan discussed with: Patient   Discharge Barriers Identified None   Physical Activity   On average, how many days per week do you engage in moderate to strenuous exercise (like a brisk walk)? 0 days   On average, how many minutes do you engage in exercise at this level? 0 min   Financial Resource Strain   How hard is it for you to pay for the very basics like food, housing, medical care, and heating? Not hard   Housing Stability   In the last 12 months, was there a time when you were not able to pay the mortgage or rent on time? N    In the last 12 months, how many places have you lived? 1   In the last 12 months, was there a time when you did not have a steady place to sleep or slept in a shelter (including now)? N   Transportation Needs   In the past 12 months, has lack of transportation kept you from medical appointments or from getting medications? no   In the past 12 months, has lack of transportation kept you from meetings, work, or from getting things needed for daily living? No   Food Insecurity   Within the past 12 months, you worried that your food would run out before you got the money to buy more. Never true   Within the past 12 months, the food you bought just didn't last and you didn't have money to get more. Never true   Stress   Do you feel stress - tense, restless, nervous, or anxious, or unable to sleep at night because your mind is troubled all the time - these days? Not at all   Social Connections   In a typical week, how many times do you talk on the phone with family, friends, or neighbors? More than 3   How often do you get together with friends or relatives? More than 3   How often do you attend Gnosticist or Uatsdin services? Never   Do you belong to any clubs or organizations such as Gnosticist groups, unions, fraternal or athletic groups, or school groups? No   How often do you attend meetings of the clubs or organizations you belong to? Never   Are you , , , , never , or living with a partner? Never marrie   Alcohol Use   Q1: How often do you have a drink containing alcohol? Never   Q2: How many drinks containing alcohol do you have on a typical day when you are drinking? None   Q3: How often do you have six or more drinks on one occasion? Never

## 2023-01-20 NOTE — NURSING
Patient called, states feeling short of breath and anxious, hard to describe exactly how he is feeling asked his Mom to tell me patient's resp are a little shallow , breath sounds are diminished to rt upper lobe , o2 put on at 2l per nasal cannula , this seems to make him relax, resp are deeper and cornelius o2 sat at 98 %,  on call notified

## 2023-01-20 NOTE — NURSING
Called to room, patient c/o involuntary movement of his tongue and feels like his throat is closing up, patient's tongue is moving in and out of his mouth almost with a pill rolling action, also he feels like his throat is swelling, when patient talks he sounds like his he wants to gag, patient is anxious , his mother is at bedside ,  on call notified

## 2023-01-21 LAB
ABS NEUT CALC (OHS): 0.68 X10(3)/MCL (ref 2.1–9.2)
ALBUMIN SERPL-MCNC: 1.7 G/DL (ref 3.5–5)
ALBUMIN/GLOB SERPL: 0.3 RATIO (ref 1.1–2)
ALP SERPL-CCNC: 192 UNIT/L (ref 40–150)
ALT SERPL-CCNC: 11 UNIT/L (ref 0–55)
ANISOCYTOSIS BLD QL SMEAR: SLIGHT
AST SERPL-CCNC: 17 UNIT/L (ref 5–34)
BILIRUBIN DIRECT+TOT PNL SERPL-MCNC: 0.3 MG/DL
BUN SERPL-MCNC: 4.5 MG/DL (ref 8.9–20.6)
BURR CELLS (OLG): ABNORMAL
CALCIUM SERPL-MCNC: 8.1 MG/DL (ref 8.4–10.2)
CHLORIDE SERPL-SCNC: 100 MMOL/L (ref 98–107)
CO2 SERPL-SCNC: 25 MMOL/L (ref 22–29)
CREAT SERPL-MCNC: 0.71 MG/DL (ref 0.73–1.18)
ERYTHROCYTE [DISTWIDTH] IN BLOOD BY AUTOMATED COUNT: 16.7 % (ref 11.5–17)
GFR SERPLBLD CREATININE-BSD FMLA CKD-EPI: >60 MLS/MIN/1.73/M2
GLOBULIN SER-MCNC: 5.3 GM/DL (ref 2.4–3.5)
GLUCOSE SERPL-MCNC: 64 MG/DL (ref 74–100)
HCT VFR BLD AUTO: 32.3 % (ref 42–52)
HGB BLD-MCNC: 10.9 GM/DL (ref 14–18)
IMM GRANULOCYTES # BLD AUTO: 0.11 X10(3)/MCL (ref 0–0.04)
IMM GRANULOCYTES NFR BLD AUTO: 8.3 %
LYMPHOCYTES NFR BLD MANUAL: 0.35 X10(3)/MCL
LYMPHOCYTES NFR BLD MANUAL: 27 % (ref 13–40)
MAGNESIUM SERPL-MCNC: 2 MG/DL (ref 1.6–2.6)
MCH RBC QN AUTO: 26.7 PG
MCHC RBC AUTO-ENTMCNC: 33.7 MG/DL (ref 33–36)
MCV RBC AUTO: 79 FL (ref 80–94)
METAMYELOCYTES NFR BLD MANUAL: 2 %
MONOCYTES NFR BLD MANUAL: 0.27 X10(3)/MCL (ref 0.1–1.3)
MONOCYTES NFR BLD MANUAL: 21 % (ref 2–11)
NEUTROPHILS NFR BLD MANUAL: 32 % (ref 47–80)
NEUTS BAND NFR BLD MANUAL: 18 % (ref 0–11)
NRBC BLD AUTO-RTO: 0 %
OVALOCYTES (OLG): SLIGHT
PHOSPHATE SERPL-MCNC: 2.2 MG/DL (ref 2.3–4.7)
PLATELET # BLD AUTO: 191 X10(3)/MCL (ref 130–400)
PLATELET # BLD EST: ADEQUATE 10*3/UL
PLATELETS.RETICULATED NFR BLD AUTO: 10.5 % (ref 0.9–11.2)
PMV BLD AUTO: 12.1 FL (ref 7.4–10.4)
POIKILOCYTOSIS BLD QL SMEAR: SLIGHT
POTASSIUM SERPL-SCNC: 3.2 MMOL/L (ref 3.5–5.1)
PROT SERPL-MCNC: 7 GM/DL (ref 6.4–8.3)
RBC # BLD AUTO: 4.09 X10(6)/MCL (ref 4.7–6.1)
RBC MORPH BLD: ABNORMAL
SODIUM SERPL-SCNC: 133 MMOL/L (ref 136–145)
VANCOMYCIN TROUGH SERPL-MCNC: 17.7 UG/ML (ref 15–20)
WBC # SPEC AUTO: 1.3 X10(3)/MCL (ref 4.5–11.5)

## 2023-01-21 PROCEDURE — 84100 ASSAY OF PHOSPHORUS: CPT

## 2023-01-21 PROCEDURE — 94761 N-INVAS EAR/PLS OXIMETRY MLT: CPT

## 2023-01-21 PROCEDURE — 80053 COMPREHEN METABOLIC PANEL: CPT

## 2023-01-21 PROCEDURE — 83735 ASSAY OF MAGNESIUM: CPT

## 2023-01-21 PROCEDURE — 80202 ASSAY OF VANCOMYCIN: CPT | Performed by: INTERNAL MEDICINE

## 2023-01-21 PROCEDURE — 25000003 PHARM REV CODE 250: Performed by: INTERNAL MEDICINE

## 2023-01-21 PROCEDURE — 36415 COLL VENOUS BLD VENIPUNCTURE: CPT

## 2023-01-21 PROCEDURE — 27000207 HC ISOLATION

## 2023-01-21 PROCEDURE — 25000003 PHARM REV CODE 250

## 2023-01-21 PROCEDURE — 63600175 PHARM REV CODE 636 W HCPCS

## 2023-01-21 PROCEDURE — 85027 COMPLETE CBC AUTOMATED: CPT

## 2023-01-21 PROCEDURE — 63600175 PHARM REV CODE 636 W HCPCS: Performed by: INTERNAL MEDICINE

## 2023-01-21 PROCEDURE — 21400001 HC TELEMETRY ROOM

## 2023-01-21 PROCEDURE — 63600175 PHARM REV CODE 636 W HCPCS: Performed by: STUDENT IN AN ORGANIZED HEALTH CARE EDUCATION/TRAINING PROGRAM

## 2023-01-21 PROCEDURE — S0039 INJECTION, SULFAMETHOXAZOLE: HCPCS

## 2023-01-21 PROCEDURE — 36415 COLL VENOUS BLD VENIPUNCTURE: CPT | Performed by: INTERNAL MEDICINE

## 2023-01-21 RX ORDER — POTASSIUM CHLORIDE 7.45 MG/ML
10 INJECTION INTRAVENOUS
Status: DISPENSED | OUTPATIENT
Start: 2023-01-21 | End: 2023-01-21

## 2023-01-21 RX ADMIN — PIPERACILLIN AND TAZOBACTAM 4.5 G: 4; .5 INJECTION, POWDER, LYOPHILIZED, FOR SOLUTION INTRAVENOUS; PARENTERAL at 12:01

## 2023-01-21 RX ADMIN — POTASSIUM CHLORIDE 10 MEQ: 7.46 INJECTION, SOLUTION INTRAVENOUS at 10:01

## 2023-01-21 RX ADMIN — ONDANSETRON 8 MG: 4 TABLET, ORALLY DISINTEGRATING ORAL at 10:01

## 2023-01-21 RX ADMIN — SULFAMETHOXAZOLE AND TRIMETHOPRIM 333.44 MG: 80; 16 INJECTION INTRAVENOUS at 05:01

## 2023-01-21 RX ADMIN — SULFAMETHOXAZOLE AND TRIMETHOPRIM 333.44 MG: 80; 16 INJECTION INTRAVENOUS at 06:01

## 2023-01-21 RX ADMIN — POTASSIUM CHLORIDE 10 MEQ: 7.46 INJECTION, SOLUTION INTRAVENOUS at 12:01

## 2023-01-21 RX ADMIN — ONDANSETRON 8 MG: 4 TABLET, ORALLY DISINTEGRATING ORAL at 01:01

## 2023-01-21 RX ADMIN — VANCOMYCIN HYDROCHLORIDE 1000 MG: 500 INJECTION, POWDER, LYOPHILIZED, FOR SOLUTION INTRAVENOUS at 05:01

## 2023-01-21 RX ADMIN — PIPERACILLIN AND TAZOBACTAM 4.5 G: 4; .5 INJECTION, POWDER, LYOPHILIZED, FOR SOLUTION INTRAVENOUS; PARENTERAL at 08:01

## 2023-01-21 RX ADMIN — FLUCONAZOLE 200 MG: 2 INJECTION, SOLUTION INTRAVENOUS at 11:01

## 2023-01-21 RX ADMIN — POTASSIUM BICARBONATE 40 MEQ: 391 TABLET, EFFERVESCENT ORAL at 09:01

## 2023-01-21 RX ADMIN — POTASSIUM CHLORIDE 10 MEQ: 7.46 INJECTION, SOLUTION INTRAVENOUS at 11:01

## 2023-01-21 RX ADMIN — SULFAMETHOXAZOLE AND TRIMETHOPRIM 333.44 MG: 80; 16 INJECTION INTRAVENOUS at 11:01

## 2023-01-21 RX ADMIN — ENOXAPARIN SODIUM 40 MG: 40 INJECTION SUBCUTANEOUS at 05:01

## 2023-01-21 NOTE — PROGRESS NOTES
[facility] Medicine Wards Progress Note     Resident Team: [facility] Medicine List 1  Attending Physician: Evelyn Kc MD  Resident: Will   Intern: Ian    Subjective:      Brief HPI:  Hanh Hassan is a 27 y.o. male with no significant medical history who presented to [facility] ED on 2023  for blood work following recent ED visit in December.  In December, patient was seen in [facility] ED for sore throat, cough, and pain when swallowing solids and liquids. He was discharged and advised to get tested for HIV at the health unit, which he was unable to do so. During this time, he started experiencing weight loss, decreased appetite, nausea, vomiting, fatigue, and night sweats.  Within the last 2 weeks, patient reports fevers, rhinorrhea, and cough productive of white sputum.  Patient denies hemoptysis, chest pain, palpitations, abdominal pain, shortness of breath.  He denies constipation or diarrhea however reports occasional mucus and bright red blood in stools.  He also reports recent bilateral lower extremity weakness requiring assistance ambulating.  Lab significant for leukopenia WBC 1.3, normocytic anemia, hyponatremia, hypophosphatemia, elevated ALP, and positive HIV.  Chest x-ray revealed patchy bilateral lung infiltrates, with dense consolidations in right middle lobe.  In the ED, he was given 1.5 L of normal saline, Zosyn, and Vancomycin.  Internal Medicine was consulted for new HIV diagnosis and pneumonia.    Interval History: No acute overnight events, Pt feeling better this AM. Unable to tolerate solid food at this time but is drinking sips of water. Mother in the room is now aware of patient's HIV diagnosis. Denies fever, chills, weakness, fatigue, CP, palpitations, SOB, diarrhea/constipation, but admits to nausea.     Review of Systems:  ROS completed and negative except as indicated above.     Objective:     Last 24 Hour Vital Signs:  BP  Min: 116/80  Max: 127/67  Temp  Av.5 °F (36.4 °C)  Min: 97.4 °F  (36.3 °C)  Max: 97.6 °F (36.4 °C)  Pulse  Av.7  Min: 67  Max: 84  Resp  Av  Min: 18  Max: 18  SpO2  Av.4 %  Min: 97 %  Max: 100 %  I/O last 3 completed shifts:  In: 1330 [P.O.:480; IV Piggyback:850]  Out: 751 [Urine:751]    Physical Examination:  General: Patient resting comfortably in bed, ill-appearing, cachectic   Eye: PERRLA, EOMI, clear conjunctiva, eyelids normal  HENT: Head-normocephalic and atraumatic. Oral well defined white plaques located on soft palate.   Neck: full range of motion, no thyromegaly or lymphadenopathy, trachea midline, supple, no palpable thyroid nodules  Respiratory: crackles in bilateral lung bases, without wheezing, rales, rhonchi   Cardiovascular: tachycardic and regular rhythm without murmurs.  No gallops or rubs no JVD.  Capillary refill within normal limits.  Gastrointestinal: soft, non-tender, non-distended with normal bowel sounds, without masses to palpation  Genitourinary: no CVA tenderness to palpation  Musculoskeletal: full range of motion of all extremities/spine without limitation or discomfort  Integumentary: hypopigmented patches located on face/scalp. Diffuse non-scarring hair loss.   Neurologic: no signs of peripheral neurological deficit, motor/sensory function intact  Psychiatric:  alert and oriented, cognitive function intact, cooperative with exam, good eye contact, judgement and insight altered, mood and affect flat.     Laboratory:  Most Recent Data:  CBC:   Lab Results   Component Value Date    WBC 1.3 (LL) 2023    HGB 10.9 (L) 2023    HCT 32.3 (L) 2023     2023    MCV 79.0 (L) 2023    RDW 16.7 2023     WBC Differential:   Recent Labs   Lab 23  1804 23  0251 23  1811 23  0323 23  0415   WBC 1.1* 1.1* 2.0* 1.5* 1.3*   HGB 7.2* 6.7* 11.0* 10.6* 10.9*   HCT 22.9* 20.7* 32.7* 31.8* 32.3*    192 199 182 191   MCV 80.4 78.7* 79.2* 78.5* 79.0*     BMP:   Lab Results   Component  Value Date     (L) 01/21/2023    K 3.2 (L) 01/21/2023    CO2 25 01/21/2023    BUN 4.5 (L) 01/21/2023    CREATININE 0.71 (L) 01/21/2023    CALCIUM 8.1 (L) 01/21/2023    MG 2.00 01/21/2023    PHOS 2.2 (L) 01/21/2023     LFTs:   Lab Results   Component Value Date    ALBUMIN 1.7 (L) 01/21/2023    BILITOT 0.3 01/21/2023    AST 17 01/21/2023    ALKPHOS 192 (H) 01/21/2023    ALT 11 01/21/2023       FLP:   Lab Results   Component Value Date    CHOL 56 01/19/2023    HDL 11 (L) 01/19/2023    TRIG 72 01/19/2023       Thyroid:   Lab Results   Component Value Date    TSH 1.435 01/18/2023      Anemia:   Lab Results   Component Value Date    IRON 7 (L) 01/18/2023    TIBC 125 (L) 01/18/2023    FERRITIN 2,325.58 (H) 01/18/2023       Cardiac:   Lab Results   Component Value Date    TROPONINI <0.010 01/18/2023    BNP 51.8 01/18/2023         Microbiology Data:  Microbiology Results (last 7 days)       Procedure Component Value Units Date/Time    Blood Culture [[id number]]  (Normal) Collected: 01/18/23 1425    Order Status: Completed Specimen: Blood from Forearm, Right Updated: 01/20/23 1900     CULTURE, BLOOD ([facility]) No Growth At 48 Hours    Blood Culture [[id number]]  (Normal) Collected: 01/18/23 1402    Order Status: Completed Specimen: Blood from Arm, Right Updated: 01/20/23 1900     CULTURE, BLOOD ([facility]) No Growth At 48 Hours    Respiratory Culture [[id number]]  (Abnormal) Collected: 01/18/23 1608    Order Status: Completed Specimen: Sputum, Expectorated Updated: 01/20/23 0759     Respiratory Culture Moderate Yeast     Comment: with normal respiratory shree        GRAM STAIN Quality 1+      Many Gram positive cocci      Many Gram Negative Rods      Many Gram Positive Rods      Few Yeast    Urine culture [[id number]] Collected: 01/18/23 1232    Order Status: Completed Specimen: Urine Updated: 01/20/23 0719     Urine Culture No Significant Growth    Chlamydia/GC, PCR [[id number]]  (Normal) Collected: 01/19/23 2233    Order Status:  Completed Specimen: Urine Updated: 01/20/23 0215     Chlamydia trachomatis PCR Not Detected     N. gonorrhea PCR Not Detected    Narrative:      The Xpert CT/NG test, performed on the GeneXpert system is a qualitative in vitro real-time polymerase chain reaction (PCR) test for the automated detected and differentiation for genomic DNA from Chlamydia trachomatis (CT) and/or Neisseria gonorrhoeae (NG).    Cryptococcal antigen, blood [[id number]] Collected: 01/18/23 1916    Order Status: Completed Specimen: Blood, Venous Updated: 01/18/23 1937     CRYPTOCOCCAL ANTIGEN, SERUM ([facility]) Negative     CRYPTOCOCCAL ANTIGEN TITER ([facility]) --    Blood Culture (site 1) [[id number]]     Order Status: Canceled Specimen: Blood     Blood Culture (site 2) [[id number]]     Order Status: Canceled Specimen: Blood     Stool Culture [[id number]]     Order Status: Sent Specimen: Stool              Other Results:  EKG (my interpretation): unchanged from previous tracings    Radiology:  Imaging Results              CT Abdomen Pelvis With Contrast (Final result)  Result time 01/18/23 14:34:11      Final result by Amairani Cruz MD (01/18/23 14:34:11)                   Impression:      1. Findings concerning for multifocal pneumonia in the lung bases.  2. No acute abnormality of the abdomen or pelvis.      Electronically signed by: Amairani Cruz  Date:    01/18/2023  Time:    14:34               Narrative:    EXAMINATION:  CT ABDOMEN PELVIS WITH CONTRAST    CLINICAL HISTORY:  Flank pain, kidney stone suspected;    TECHNIQUE:  CT imaging was performed of the abdomen and pelvis after the administration of intravenous contrast. Dose length product is 157 mGycm. Automatic exposure control, adjustment of mA/kV or iterative reconstruction technique was used to limit radiation dose.    COMPARISON:  None    FINDINGS:  Liver: Normal.    Gallbladder and biliary tree: No calcified gallstones. No intra or extrahepatic biliary ductal dilation.    Pancreas:  Normal.    Spleen: Normal.    Adrenals: Normal.    Kidneys and ureters: There is no obstructing urinary calculus.  There is no hydronephrosis.    Bladder: Normal.    Reproductive organs: No pelvic masses.    Stomach/bowel: No evidence of bowel obstruction. The appendix is not clearly identified.  No discernible bowel inflammation.    Lymph nodes: No pathologically enlarged lymph node identified.    Peritoneum: No ascites or free air. No fluid collection.    Vessels: No abdominal aortic aneurysm.    Abdominal wall: Normal.    Lung bases: There are consolidative changes in the right lower and middle lobes with additional scattered bibasilar airspace opacities.    Bones: No acute osseous findings.                                       X-Ray Chest PA And Lateral (Final result)  Result time 01/18/23 13:59:35      Final result by Tom Quezada MD (01/18/23 13:59:35)                   Impression:      Bilateral lungs infiltrates.      Electronically signed by: Tom Quezada  Date:    01/18/2023  Time:    13:59               Narrative:    EXAMINATION:  XR CHEST PA AND LATERAL    CLINICAL HISTORY:  SV;    TECHNIQUE:  Two views    COMPARISON:  December 6, 2022.    FINDINGS:  Cardiopericardial silhouette is within normal limits.  There is dense consolidation which involves the right middle lung lobe.  There are additional patchy infiltrates which involve the right lower lung lobe and to a lesser degree the left lower lung lobe.  No pulmonary edema or pneumothorax.  No fluid accumulation within the pleural spaces.                        ED Interpretation by Delfino Mishra MD (01/18/23 13:56:26, [facility], Emergency Medicine)    Signs of pneumonia in the right lower lobe.  No cardiomegaly or pneumothorax                                    Current Medications:     Infusions:       Scheduled:   enoxaparin  40 mg Subcutaneous Daily    fluconazole (DIFLUCAN) IV (PEDS and ADULTS)  200 mg Intravenous  Q24H    megestroL  40 mg Oral Daily    piperacillin-tazobactam (ZOSYN) IVPB  4.5 g Intravenous Q8H    potassium bicarbonate  40 mEq Oral Once    potassium chloride  10 mEq Intravenous Q1H    sodium phosphate IVPB  15 mmol Intravenous Once    trimethoprim-sulfamethoxasole (BACTRIM) IVPB (for non fluid restricted pts) (fixed ratio)  20 mg/kg/day Intravenous Q8H    vancomycin (VANCOCIN) IVPB  1,000 mg Intravenous Q12H        PRN:  sodium chloride, acetaminophen, dextrose 10%, dextrose 10%, glucagon (human recombinant), glucose, glucose, naloxone, ondansetron, sodium chloride 0.9%, Pharmacy to dose Vancomycin consult **AND** vancomycin - pharmacy to dose    Antibiotics and Day Number of Therapy:  Vanc, Zosyn day 2  Bactrim day 2    Lines and Day Number of Therapy:  PIV    Assessment & Plan:   HIV; Oral candidiasis   - HIV 1/2 Ag/Ab reactive   - CD4 count, HIV PCR, HIV 1/2 Antibody Diff pending  - PJP PCR, fungitell pending   - Hepatitis panel negative, Cryptococcus negative  - Gonorrhea/Chlamydia negative   - Syphilis Ab reactive, RPR reactive. Treated at [facility] with single dose penicillin G for secondary syphilis on 12/19/21. Contacted Infectious disease and confirmed completed treatment for secondary syphilis on 12/2021. Initial ; repeat was 64  - Respiratory culture positive for moderate Yeast  - changed nystatin suspension 100 mg qid to IV fluconazole due to difficulty/pain w/swallowing of 400 mg day for loading dose; 200 mg qd on 01/21 and afterwards for total of 14 days  - CD4 count 7.9; viral load of 340,000  - Will consider opportunistic infection prophylaxis   - speech therapy consult placed for swallow study; consider EGD     2. Community Acquired PNA, Suspected PJP; Sepsis   - SIRS 3/4: leukopenia, tachycardia, fever with respiratory source   - Chest xray revealed patchy bilateral infiltrates with dense consolidation  in right middle lobe   - COVID/FLU PCR negative  - WBC increased from 1.1 yesterday to  1.5 today   - Neutropenic precautions   - Continue broad spectrum abx with IV Zosyn and Vancomycin and Bactrim for suspected PJP infection   - Continue IVF with  mL/hr   - Blood cultures pending   - Respiratory cultures no growth at 24 hrs   - PJP PCR, Fungitell pending      3. Hyponatremia; Hypophosphatemia   - Sodium 133 and phos 2.1 this AM; repleted this AM  - Serum osmolality 272, urine osmolality wnl, urine Na <20  - Stool studies, Legionella,Giardia/Cryptosporidium pending    - Will continue to monitor and replete as necessary      4. Normocytic anemia   - H/H of 10.9/32.3 today from 10.6/31.8 on 01/20 from 6.7/20.7 on 01/19 after receiving 2 units of pRBC on 01/19/2023  - Ordered FOBT given hx of hematochezia; patient still has not had bowel movement  - Continue to monitor H/H for further drop and need for intervention    5. Malnutrition   - Consulted Nutrition  - Continue neutropenic diet and encourage oral intake   - ordered chocolate boost TID   - ordered megace 40 mg qd to stimulate appetite per recommendation       CODE STATUS: FULL   Access: PIV   Antibiotics: zosyn, vancomycin, bactrim   Diet: regular   DVT Prophylaxis: lovenox   GI Prophylaxis:   Fluids:  ml/hr       Disposition: Continue IV abx for CAP coverage and presumed PJP. Continue infectious disease workup for newly diagnosed HIV. Will discharge patient once able to tolerate PO diet to f/u on outpt infectious disease       Christiano Sosa MD     [facility] Family Medicine Resident HO-1  01/21/2023

## 2023-01-21 NOTE — PT/OT/SLP PROGRESS
Physical Therapy  Missed Treatment Note    Patient Name:  Hanh Hassan   MRN:  [id number]    Patient not seen today secondary to Nurse/ GALLO hold, Other (Comment) (nurse reported pt not appropriate at this time. nurse just put on warming blanket to raise his temp). Will follow-up as scheduled.

## 2023-01-22 LAB
1,3 BETA GLUCAN SER-MCNC: 185 PG/ML
ABS NEUT CALC (OHS): 0.74 X10(3)/MCL (ref 2.1–9.2)
ALBUMIN SERPL-MCNC: 1.7 G/DL (ref 3.5–5)
ALBUMIN/GLOB SERPL: 0.3 RATIO (ref 1.1–2)
ALP SERPL-CCNC: 186 UNIT/L (ref 40–150)
ALT SERPL-CCNC: 11 UNIT/L (ref 0–55)
ANISOCYTOSIS BLD QL SMEAR: ABNORMAL
AR (1,3)-BETA-D-GLUCAN INTERPRETATION: POSITIVE
AST SERPL-CCNC: 17 UNIT/L (ref 5–34)
BACTERIA SPEC CULT: ABNORMAL
BACTERIA SPEC CULT: ABNORMAL
BILIRUBIN DIRECT+TOT PNL SERPL-MCNC: 0.3 MG/DL
BUN SERPL-MCNC: 3.5 MG/DL (ref 8.9–20.6)
CALCIUM SERPL-MCNC: 8.1 MG/DL (ref 8.4–10.2)
CHLORIDE SERPL-SCNC: 104 MMOL/L (ref 98–107)
CO2 SERPL-SCNC: 22 MMOL/L (ref 22–29)
CREAT SERPL-MCNC: 0.69 MG/DL (ref 0.73–1.18)
ERYTHROCYTE [DISTWIDTH] IN BLOOD BY AUTOMATED COUNT: 16.9 % (ref 11.5–17)
GFR SERPLBLD CREATININE-BSD FMLA CKD-EPI: >60 MLS/MIN/1.73/M2
GLOBULIN SER-MCNC: 4.9 GM/DL (ref 2.4–3.5)
GLUCOSE SERPL-MCNC: 56 MG/DL (ref 74–100)
GRAM STN SPEC: ABNORMAL
HCT VFR BLD AUTO: 33.7 % (ref 42–52)
HGB BLD-MCNC: 10.8 GM/DL (ref 14–18)
IMM GRANULOCYTES # BLD AUTO: 0.02 X10(3)/MCL (ref 0–0.04)
IMM GRANULOCYTES NFR BLD AUTO: 1.6 %
LYMPHOCYTES NFR BLD MANUAL: 0.19 X10(3)/MCL
LYMPHOCYTES NFR BLD MANUAL: 16 % (ref 13–40)
MAGNESIUM SERPL-MCNC: 2 MG/DL (ref 1.6–2.6)
MCH RBC QN AUTO: 25.5 PG
MCHC RBC AUTO-ENTMCNC: 32 MG/DL (ref 33–36)
MCV RBC AUTO: 79.5 FL (ref 80–94)
MONOCYTES NFR BLD MANUAL: 0.26 X10(3)/MCL (ref 0.1–1.3)
MONOCYTES NFR BLD MANUAL: 22 % (ref 2–11)
NEUTROPHILS NFR BLD MANUAL: 62 % (ref 47–80)
NRBC BLD AUTO-RTO: 0 %
PHOSPHATE SERPL-MCNC: 2.6 MG/DL (ref 2.3–4.7)
PLATELET # BLD AUTO: 232 X10(3)/MCL (ref 130–400)
PLATELET # BLD EST: NORMAL 10*3/UL
PMV BLD AUTO: 12 FL (ref 7.4–10.4)
POTASSIUM SERPL-SCNC: 3.5 MMOL/L (ref 3.5–5.1)
PROT SERPL-MCNC: 6.6 GM/DL (ref 6.4–8.3)
RBC # BLD AUTO: 4.24 X10(6)/MCL (ref 4.7–6.1)
RBC MORPH BLD: ABNORMAL
SODIUM SERPL-SCNC: 135 MMOL/L (ref 136–145)
WBC # SPEC AUTO: 1.2 X10(3)/MCL (ref 4.5–11.5)

## 2023-01-22 PROCEDURE — 25000003 PHARM REV CODE 250

## 2023-01-22 PROCEDURE — 80053 COMPREHEN METABOLIC PANEL: CPT

## 2023-01-22 PROCEDURE — 63600175 PHARM REV CODE 636 W HCPCS: Performed by: INTERNAL MEDICINE

## 2023-01-22 PROCEDURE — 85027 COMPLETE CBC AUTOMATED: CPT

## 2023-01-22 PROCEDURE — 21400001 HC TELEMETRY ROOM

## 2023-01-22 PROCEDURE — 97116 GAIT TRAINING THERAPY: CPT

## 2023-01-22 PROCEDURE — 83735 ASSAY OF MAGNESIUM: CPT

## 2023-01-22 PROCEDURE — S0039 INJECTION, SULFAMETHOXAZOLE: HCPCS

## 2023-01-22 PROCEDURE — 27000207 HC ISOLATION

## 2023-01-22 PROCEDURE — 84100 ASSAY OF PHOSPHORUS: CPT

## 2023-01-22 PROCEDURE — 63600175 PHARM REV CODE 636 W HCPCS

## 2023-01-22 PROCEDURE — 36415 COLL VENOUS BLD VENIPUNCTURE: CPT

## 2023-01-22 PROCEDURE — 25000003 PHARM REV CODE 250: Performed by: INTERNAL MEDICINE

## 2023-01-22 PROCEDURE — 63600175 PHARM REV CODE 636 W HCPCS: Performed by: STUDENT IN AN ORGANIZED HEALTH CARE EDUCATION/TRAINING PROGRAM

## 2023-01-22 PROCEDURE — 94761 N-INVAS EAR/PLS OXIMETRY MLT: CPT

## 2023-01-22 RX ORDER — ONDANSETRON 4 MG/1
8 TABLET, ORALLY DISINTEGRATING ORAL EVERY 6 HOURS PRN
Status: DISCONTINUED | OUTPATIENT
Start: 2023-01-22 | End: 2023-01-26 | Stop reason: HOSPADM

## 2023-01-22 RX ADMIN — VANCOMYCIN HYDROCHLORIDE 1000 MG: 500 INJECTION, POWDER, LYOPHILIZED, FOR SOLUTION INTRAVENOUS at 04:01

## 2023-01-22 RX ADMIN — SULFAMETHOXAZOLE AND TRIMETHOPRIM 333.44 MG: 80; 16 INJECTION INTRAVENOUS at 10:01

## 2023-01-22 RX ADMIN — PIPERACILLIN AND TAZOBACTAM 4.5 G: 4; .5 INJECTION, POWDER, LYOPHILIZED, FOR SOLUTION INTRAVENOUS; PARENTERAL at 04:01

## 2023-01-22 RX ADMIN — MEGESTROL ACETATE 40 MG: 40 TABLET ORAL at 08:01

## 2023-01-22 RX ADMIN — FLUCONAZOLE 200 MG: 2 INJECTION, SOLUTION INTRAVENOUS at 08:01

## 2023-01-22 RX ADMIN — ENOXAPARIN SODIUM 40 MG: 40 INJECTION SUBCUTANEOUS at 04:01

## 2023-01-22 RX ADMIN — SULFAMETHOXAZOLE AND TRIMETHOPRIM 333.44 MG: 80; 16 INJECTION INTRAVENOUS at 02:01

## 2023-01-22 RX ADMIN — SULFAMETHOXAZOLE AND TRIMETHOPRIM 333.44 MG: 80; 16 INJECTION INTRAVENOUS at 07:01

## 2023-01-22 RX ADMIN — POTASSIUM PHOSPHATE, MONOBASIC AND POTASSIUM PHOSPHATE, DIBASIC 20 MMOL: 224; 236 INJECTION, SOLUTION, CONCENTRATE INTRAVENOUS at 07:01

## 2023-01-22 NOTE — PLAN OF CARE
Problem: Adult Inpatient Plan of Care  Goal: Plan of Care Review  Outcome: Ongoing, Progressing  Goal: Patient-Specific Goal (Individualized)  Outcome: Ongoing, Progressing  Goal: Absence of Hospital-Acquired Illness or Injury  Outcome: Ongoing, Progressing  Goal: Optimal Comfort and Wellbeing  Outcome: Ongoing, Progressing  Goal: Readiness for Transition of Care  Outcome: Ongoing, Progressing     Problem: Skin Injury Risk Increased  Goal: Skin Health and Integrity  Outcome: Ongoing, Progressing     Problem: Neutropenia  Goal: Absence of Infection  Outcome: Ongoing, Progressing

## 2023-01-22 NOTE — PROGRESS NOTES
[facility] Medicine Wards Progress Note     Resident Team: [facility] Medicine List 1  Attending Physician: Evelyn Kc MD  Resident: Will   Intern: Ian    Subjective:      Brief HPI:  Hanh Hassan is a 27 y.o. male with no significant medical history who presented to [facility] ED on 1/18/2023  for blood work following recent ED visit in December.  In December, patient was seen in [facility] ED for sore throat, cough, and pain when swallowing solids and liquids. He was discharged and advised to get tested for HIV at the health unit, which he was unable to do so. During this time, he started experiencing weight loss, decreased appetite, nausea, vomiting, fatigue, and night sweats.  Within the last 2 weeks, patient reports fevers, rhinorrhea, and cough productive of white sputum.  Patient denies hemoptysis, chest pain, palpitations, abdominal pain, shortness of breath.  He denies constipation or diarrhea however reports occasional mucus and bright red blood in stools.  He also reports recent bilateral lower extremity weakness requiring assistance ambulating.  Lab significant for leukopenia WBC 1.3, normocytic anemia, hyponatremia, hypophosphatemia, elevated ALP, and positive HIV.  Chest x-ray revealed patchy bilateral lung infiltrates, with dense consolidations in right middle lobe.  In the ED, he was given 1.5 L of normal saline, Zosyn, and Vancomycin.  Internal Medicine was consulted for new HIV diagnosis and pneumonia.    Interval History: No acute overnight events, pt unable to tolerate food, but he has been eating jello and drinking sips of water. He reports decreased appetite and nausea when he attempts to eat. He agreed to try to eat a little more today, will start with gentle foods to stimulate appetite.  Denies fever, chills, weakness, fatigue, CP, palpitations, SOB, diarrhea/constipation.      Review of Systems:  ROS completed and negative except as indicated above.     Objective:     Last 24 Hour Vital Signs:  BP  Min:  107/79  Max: 126/78  Temp  Av.5 °F (36.4 °C)  Min: 97.4 °F (36.3 °C)  Max: 97.8 °F (36.6 °C)  Pulse  Av  Min: 61  Max: 77  Resp  Av  Min: 20  Max: 20  SpO2  Av.9 %  Min: 96 %  Max: 100 %  I/O last 3 completed shifts:  In: -   Out: 2250 [Urine:2250]    Physical Examination:  General: Patient resting comfortably in bed, ill-appearing, cachectic   Eye: PERRLA, EOMI, clear conjunctiva, eyelids normal  HENT: Head-normocephalic and atraumatic. Oral well defined white plaques located on soft palate.   Neck: full range of motion, no thyromegaly or lymphadenopathy, trachea midline, supple, no palpable thyroid nodules  Respiratory: crackles in bilateral lung bases, without wheezing, rales, rhonchi   Cardiovascular: tachycardic and regular rhythm without murmurs.  No gallops or rubs no JVD.  Capillary refill within normal limits.  Gastrointestinal: soft, non-tender, non-distended with normal bowel sounds, without masses to palpation  Genitourinary: no CVA tenderness to palpation  Musculoskeletal: full range of motion of all extremities/spine without limitation or discomfort  Integumentary: hypopigmented patches located on face/scalp. Diffuse non-scarring hair loss.   Neurologic: no signs of peripheral neurological deficit, motor/sensory function intact  Psychiatric:  alert and oriented, cognitive function intact, cooperative with exam, good eye contact, judgement and insight altered, mood and affect flat.     Laboratory:  Most Recent Data:  CBC:   Lab Results   Component Value Date    WBC 1.2 (LL) 2023    HGB 10.8 (L) 2023    HCT 33.7 (L) 2023     2023    MCV 79.5 (L) 2023    RDW 16.9 2023     WBC Differential:   Recent Labs   Lab 23  0251 23  1811 23  0323 23  0415 23  0258   WBC 1.1* 2.0* 1.5* 1.3* 1.2*   HGB 6.7* 11.0* 10.6* 10.9* 10.8*   HCT 20.7* 32.7* 31.8* 32.3* 33.7*    199 182 191 232   MCV 78.7* 79.2* 78.5* 79.0* 79.5*      BMP:   Lab Results   Component Value Date     (L) 01/22/2023    K 3.5 01/22/2023    CO2 22 01/22/2023    BUN 3.5 (L) 01/22/2023    CREATININE 0.69 (L) 01/22/2023    CALCIUM 8.1 (L) 01/22/2023    MG 2.00 01/22/2023    PHOS 2.6 01/22/2023     LFTs:   Lab Results   Component Value Date    ALBUMIN 1.7 (L) 01/22/2023    BILITOT 0.3 01/22/2023    AST 17 01/22/2023    ALKPHOS 186 (H) 01/22/2023    ALT 11 01/22/2023       FLP:   Lab Results   Component Value Date    CHOL 56 01/19/2023    HDL 11 (L) 01/19/2023    TRIG 72 01/19/2023       Thyroid:   Lab Results   Component Value Date    TSH 1.435 01/18/2023      Anemia:   Lab Results   Component Value Date    IRON 7 (L) 01/18/2023    TIBC 125 (L) 01/18/2023    FERRITIN 2,325.58 (H) 01/18/2023       Cardiac:   Lab Results   Component Value Date    TROPONINI <0.010 01/18/2023    BNP 51.8 01/18/2023         Microbiology Data:  Microbiology Results (last 7 days)       Procedure Component Value Units Date/Time    Blood Culture [[id number]]  (Normal) Collected: 01/18/23 1425    Order Status: Completed Specimen: Blood from Forearm, Right Updated: 01/21/23 1901     CULTURE, BLOOD ([facility]) No Growth At 72 Hours    Blood Culture [[id number]]  (Normal) Collected: 01/18/23 1402    Order Status: Completed Specimen: Blood from Arm, Right Updated: 01/21/23 1900     CULTURE, BLOOD ([facility]) No Growth At 72 Hours    Respiratory Culture [[id number]]  (Abnormal) Collected: 01/18/23 1608    Order Status: Completed Specimen: Sputum, Expectorated Updated: 01/21/23 1135     Respiratory Culture Moderate Yeast      Moderate Staphylococcus aureus     Comment: with normal respiratory shree        GRAM STAIN Quality 1+      Many Gram positive cocci      Many Gram Negative Rods      Many Gram Positive Rods      Few Yeast    Urine culture [[id number]] Collected: 01/18/23 1232    Order Status: Completed Specimen: Urine Updated: 01/20/23 0719     Urine Culture No Significant Growth    Chlamydia/GC, PCR  [[id number]]  (Normal) Collected: 01/19/23 2235    Order Status: Completed Specimen: Urine Updated: 01/20/23 0215     Chlamydia trachomatis PCR Not Detected     N. gonorrhea PCR Not Detected    Narrative:      The Xpert CT/NG test, performed on the GeneXpert system is a qualitative in vitro real-time polymerase chain reaction (PCR) test for the automated detected and differentiation for genomic DNA from Chlamydia trachomatis (CT) and/or Neisseria gonorrhoeae (NG).    Cryptococcal antigen, blood [[id number]] Collected: 01/18/23 1916    Order Status: Completed Specimen: Blood, Venous Updated: 01/18/23 1937     CRYPTOCOCCAL ANTIGEN, SERUM ([facility]) Negative     CRYPTOCOCCAL ANTIGEN TITER ([facility]) --    Blood Culture (site 1) [[id number]]     Order Status: Canceled Specimen: Blood     Blood Culture (site 2) [[id number]]     Order Status: Canceled Specimen: Blood     Stool Culture [[id number]]     Order Status: Sent Specimen: Stool              Other Results:  EKG (my interpretation): unchanged from previous tracings    Radiology:  Imaging Results              CT Abdomen Pelvis With Contrast (Final result)  Result time 01/18/23 14:34:11      Final result by Amairani Cruz MD (01/18/23 14:34:11)                   Impression:      1. Findings concerning for multifocal pneumonia in the lung bases.  2. No acute abnormality of the abdomen or pelvis.      Electronically signed by: Amairani Cruz  Date:    01/18/2023  Time:    14:34               Narrative:    EXAMINATION:  CT ABDOMEN PELVIS WITH CONTRAST    CLINICAL HISTORY:  Flank pain, kidney stone suspected;    TECHNIQUE:  CT imaging was performed of the abdomen and pelvis after the administration of intravenous contrast. Dose length product is 157 mGycm. Automatic exposure control, adjustment of mA/kV or iterative reconstruction technique was used to limit radiation dose.    COMPARISON:  None    FINDINGS:  Liver: Normal.    Gallbladder and biliary tree: No calcified gallstones.  No intra or extrahepatic biliary ductal dilation.    Pancreas: Normal.    Spleen: Normal.    Adrenals: Normal.    Kidneys and ureters: There is no obstructing urinary calculus.  There is no hydronephrosis.    Bladder: Normal.    Reproductive organs: No pelvic masses.    Stomach/bowel: No evidence of bowel obstruction. The appendix is not clearly identified.  No discernible bowel inflammation.    Lymph nodes: No pathologically enlarged lymph node identified.    Peritoneum: No ascites or free air. No fluid collection.    Vessels: No abdominal aortic aneurysm.    Abdominal wall: Normal.    Lung bases: There are consolidative changes in the right lower and middle lobes with additional scattered bibasilar airspace opacities.    Bones: No acute osseous findings.                                       X-Ray Chest PA And Lateral (Final result)  Result time 01/18/23 13:59:35      Final result by Tom Quezada MD (01/18/23 13:59:35)                   Impression:      Bilateral lungs infiltrates.      Electronically signed by: Tom Quezada  Date:    01/18/2023  Time:    13:59               Narrative:    EXAMINATION:  XR CHEST PA AND LATERAL    CLINICAL HISTORY:  SV;    TECHNIQUE:  Two views    COMPARISON:  December 6, 2022.    FINDINGS:  Cardiopericardial silhouette is within normal limits.  There is dense consolidation which involves the right middle lung lobe.  There are additional patchy infiltrates which involve the right lower lung lobe and to a lesser degree the left lower lung lobe.  No pulmonary edema or pneumothorax.  No fluid accumulation within the pleural spaces.                        ED Interpretation by Delfino Mishra MD (01/18/23 13:56:26, [facility], Emergency Medicine)    Signs of pneumonia in the right lower lobe.  No cardiomegaly or pneumothorax                                    Current Medications:     Infusions:       Scheduled:   enoxaparin  40 mg Subcutaneous Daily     fluconazole (DIFLUCAN) IV (PEDS and ADULTS)  200 mg Intravenous Q24H    megestroL  40 mg Oral Daily    piperacillin-tazobactam (ZOSYN) IVPB  4.5 g Intravenous Q8H    potassium phosphate IVPB  20 mmol Intravenous Once    sodium phosphate IVPB  15 mmol Intravenous Once    trimethoprim-sulfamethoxasole (BACTRIM) IVPB (for non fluid restricted pts) (fixed ratio)  20 mg/kg/day Intravenous Q8H        PRN:  sodium chloride, acetaminophen, dextrose 10%, dextrose 10%, glucagon (human recombinant), glucose, glucose, naloxone, ondansetron, sodium chloride 0.9%, Pharmacy to dose Vancomycin consult **AND** vancomycin - pharmacy to dose    Antibiotics and Day Number of Therapy:  Vanc, Zosyn day 3 (discontinue)  Bactrim day 3    Lines and Day Number of Therapy:  PIV    Assessment & Plan:   HIV  Oral candidiasis   - HIV 1/2 Ag/Ab reactive   - Hepatitis panel negative, Cryptococcus negative  - Gonorrhea/Chlamydia negative   - Syphilis Ab reactive, RPR reactive. Treated at [facility] with single dose penicillin G for secondary syphilis on 12/19/21. Contacted Infectious disease and confirmed completed treatment for secondary syphilis on 12/2021. Initial ; repeat was 64  - Respiratory culture positive for moderate Yeast  - Continue IV fluconazole 200 mg qd for total of 14 days  - CD4 count 7.9; viral load of 340,000  - Will consider opportunistic infection prophylaxis   - Speech therapy consult placed for swallow study, recommends thin liquids and regular consistencies; consider EGD     Community Acquired PNA  Suspected PJP  Sepsis   - SIRS 3/4: leukopenia, tachycardia, fever with respiratory source   - Chest xray revealed patchy bilateral infiltrates with dense consolidation  in right middle lobe   - COVID/FLU PCR negative  - WBC 1.2 from 1.5 today   - Neutropenic precautions   - Respiratory cultures no growth at 72 hrs   - Will discontinue Zosyn and Vanc. Continue Bactrim for PJP infection   - Continue IVF with  mL/hr   - PJP  PCR pending, Fungitell positive      Hyponatremia  Hypophosphatemia   - Sodium improved this AM   - Serum osmolality 272, urine osmolality wnl, urine Na <20  - Stool studies, Legionella,Giardia/Cryptosporidium pending    - Will continue to monitor and replete as necessary      Normocytic anemia   - H/H stable this AM   - Ordered FOBT given hx of hematochezia; patient still has not had bowel movement  - Continue to monitor H/H for further drop and need for intervention    Malnutrition   - Consulted Nutrition  - Continue neutropenic diet and encourage oral intake   - Continue chocolate boost TID   - Continue megace 40 mg qd to stimulate appetite per recommendation       CODE STATUS: FULL   Access: PIV   Antibiotics: zosyn, vancomycin, bactrim   Diet: regular   DVT Prophylaxis: lovenox   GI Prophylaxis:   Fluids:  ml/hr       Disposition: Continue Bactrim for PJP infection. Continue infectious disease workup for newly diagnosed HIV. Will discharge patient once able to tolerate PO diet to f/u on outpt infectious disease       Ronel Garrido MD  [facility] [facility], HO-[facility]

## 2023-01-22 NOTE — PT/OT/SLP PROGRESS
Physical Therapy Treatment    Patient Name:  Hanh Hassan   MRN:  [id number]    Recommendations:     Discharge Recommendations:  home health PT   Discharge Equipment Recommendations: walker, rolling   Barriers to discharge: Severity of deficits and Endurance    Assessment:     Hanh Hassan is a 27 y.o. male admitted with a medical diagnosis of   1. HIV infection, unspecified symptom status    2. Chest pain    3. Pneumonia due to infectious organism, unspecified laterality, unspecified part of lung    4. Normocytic anemia    Patient is agreeable with encouragement. Pt's mother stepped out for session  He presents with the following impairments/functional limitations:  weakness, impaired endurance, impaired self care skills, impaired functional mobility, gait instability, impaired balance, decreased lower extremity function .    Rehab Prognosis: Good; patient would benefit from acute skilled PT services to address these deficits and reach maximum level of function.    Recent Surgery: * No surgery found *      Plan:     During this hospitalization, patient to be seen  (3-5 times per week) to address the identified rehab impairments via gait training, therapeutic activities, therapeutic exercises and progress toward the following goals:    Plan of Care Expires:  02/16/23    Subjective     Chief Complaint: being cold  Patient/Family Comments/goals: none stated  Pain/Comfort:  Pain Rating 1: 0/10      Objective:     Communicated with nurse felipe prior to session.  Patient found HOB elevated with PICC line, telemetry upon PT entry to room.     General Precautions: Standard, fall, neutropenic   Orthopedic Precautions:N/A   Braces: N/A  Respiratory Status: Room air     Functional Mobility:  Bed Mobility:     Supine to Sit: stand by assistance  Sit to Supine: stand by assistance    Transfers:     Sit to Stand:  minimum assistance with rolling walker x 2 trials from bed    Gait: .Patient ambulated 130ft with Rolling Walker  and minimal assistance using swing through. Patient demonstrated decreased step length, decreased stride length, and decreased HEIDI and downward focus  during gait due to impaired balance, impaired postural control, and decreased strength. Pt returned to bed afterwards. No additional activity            Therapeutic Activities and Exercises:   Sitting balance;   -pt sits EOB with SBA/ S   -mod A to karyn pants prior to gait    Standing balance  -pt stands to complete donning pants  --PT changes front gown due to spillage from urinal  -PT donns back gown  -pt stands for marches while nursing manages IV. Min A for balance. Pt stands for ~ 30 sec    Patient left HOB elevated with all lines intact, call button in reach, and nurse notified..PT provided ed and encouragement on sitting EOB for meals. Pt expressed understanding    GOALS:   Multidisciplinary Problems       Physical Therapy Goals          Problem: Physical Therapy    Goal Priority Disciplines Outcome Goal Variances Interventions   Physical Therapy Goal     PT, PT/OT Ongoing, Progressing     Description: Goals to be met by: Discharge     Patient will increase functional independence with mobility by performing:    -. Supine to sit with Modified [place]-ONGOING  -. Sit to supine with Modified [place]-ONGOING  -. Sit to stand transfer with Supervision -ONGOING  -. Gait  x 260 feet with Stand-by Assistance using Rolling Walker. -ONGOING                         Time Tracking:     PT Received On: 01/22/23  PT Start Time: 1022     PT Stop Time: 1046  PT Total Time (min): 24 min     Billable Minutes: Gait Training 24    Treatment Type: Treatment  PT/PTA: PT     PTA Visit Number: 0     01/22/2023

## 2023-01-23 LAB
ABS NEUT CALC (OHS): 0.35 X10(3)/MCL (ref 2.1–9.2)
ALBUMIN SERPL-MCNC: 1.8 G/DL (ref 3.5–5)
ALBUMIN/GLOB SERPL: 0.4 RATIO (ref 1.1–2)
ALP SERPL-CCNC: 180 UNIT/L (ref 40–150)
ALT SERPL-CCNC: 10 UNIT/L (ref 0–55)
ANISOCYTOSIS BLD QL SMEAR: ABNORMAL
AST SERPL-CCNC: 17 UNIT/L (ref 5–34)
BACTERIA BLD CULT: NORMAL
BACTERIA BLD CULT: NORMAL
BILIRUBIN DIRECT+TOT PNL SERPL-MCNC: 0.2 MG/DL
BUN SERPL-MCNC: 3.6 MG/DL (ref 8.9–20.6)
CALCIUM SERPL-MCNC: 8.4 MG/DL (ref 8.4–10.2)
CHLORIDE SERPL-SCNC: 102 MMOL/L (ref 98–107)
CO2 SERPL-SCNC: 21 MMOL/L (ref 22–29)
CREAT SERPL-MCNC: 0.83 MG/DL (ref 0.73–1.18)
ERYTHROCYTE [DISTWIDTH] IN BLOOD BY AUTOMATED COUNT: 16.8 % (ref 11.5–17)
GFR SERPLBLD CREATININE-BSD FMLA CKD-EPI: >60 MLS/MIN/1.73/M2
GLOBULIN SER-MCNC: 5.1 GM/DL (ref 2.4–3.5)
GLUCOSE SERPL-MCNC: 73 MG/DL (ref 74–100)
HCT VFR BLD AUTO: 33.1 % (ref 42–52)
HGB BLD-MCNC: 10.7 GM/DL (ref 14–18)
IMM GRANULOCYTES # BLD AUTO: 0 X10(3)/MCL (ref 0–0.04)
IMM GRANULOCYTES NFR BLD AUTO: 0 %
LYMPHOCYTES NFR BLD MANUAL: 0.24 X10(3)/MCL
LYMPHOCYTES NFR BLD MANUAL: 22 % (ref 13–40)
MAGNESIUM SERPL-MCNC: 1.7 MG/DL (ref 1.6–2.6)
MCH RBC QN AUTO: 25.7 PG
MCHC RBC AUTO-ENTMCNC: 32.3 MG/DL (ref 33–36)
MCV RBC AUTO: 79.6 FL (ref 80–94)
MONOCYTES NFR BLD MANUAL: 0.51 X10(3)/MCL (ref 0.1–1.3)
MONOCYTES NFR BLD MANUAL: 46 % (ref 2–11)
NEUTROPHILS NFR BLD MANUAL: 32 % (ref 47–80)
NRBC BLD AUTO-RTO: 0 %
PHOSPHATE SERPL-MCNC: 3.3 MG/DL (ref 2.3–4.7)
PLATELET # BLD AUTO: 245 X10(3)/MCL (ref 130–400)
PLATELET # BLD EST: NORMAL 10*3/UL
PMV BLD AUTO: 11.3 FL (ref 7.4–10.4)
POIKILOCYTOSIS BLD QL SMEAR: ABNORMAL
POTASSIUM SERPL-SCNC: 3.9 MMOL/L (ref 3.5–5.1)
PROT SERPL-MCNC: 6.9 GM/DL (ref 6.4–8.3)
RBC # BLD AUTO: 4.16 X10(6)/MCL (ref 4.7–6.1)
RBC MORPH BLD: ABNORMAL
SCHISTOCYTE (OLG): ABNORMAL
SODIUM SERPL-SCNC: 131 MMOL/L (ref 136–145)
VANCOMYCIN TROUGH SERPL-MCNC: 10.2 UG/ML (ref 15–20)
WBC # SPEC AUTO: 1.1 X10(3)/MCL (ref 4.5–11.5)

## 2023-01-23 PROCEDURE — 25000003 PHARM REV CODE 250

## 2023-01-23 PROCEDURE — 21400001 HC TELEMETRY ROOM

## 2023-01-23 PROCEDURE — 63600175 PHARM REV CODE 636 W HCPCS

## 2023-01-23 PROCEDURE — 83735 ASSAY OF MAGNESIUM: CPT

## 2023-01-23 PROCEDURE — 63600175 PHARM REV CODE 636 W HCPCS: Performed by: STUDENT IN AN ORGANIZED HEALTH CARE EDUCATION/TRAINING PROGRAM

## 2023-01-23 PROCEDURE — S0039 INJECTION, SULFAMETHOXAZOLE: HCPCS

## 2023-01-23 PROCEDURE — 36415 COLL VENOUS BLD VENIPUNCTURE: CPT

## 2023-01-23 PROCEDURE — 85027 COMPLETE CBC AUTOMATED: CPT

## 2023-01-23 PROCEDURE — 80202 ASSAY OF VANCOMYCIN: CPT | Performed by: INTERNAL MEDICINE

## 2023-01-23 PROCEDURE — 84100 ASSAY OF PHOSPHORUS: CPT

## 2023-01-23 PROCEDURE — 97530 THERAPEUTIC ACTIVITIES: CPT

## 2023-01-23 PROCEDURE — 80053 COMPREHEN METABOLIC PANEL: CPT

## 2023-01-23 PROCEDURE — 27000207 HC ISOLATION

## 2023-01-23 PROCEDURE — 25000003 PHARM REV CODE 250: Performed by: INTERNAL MEDICINE

## 2023-01-23 PROCEDURE — 97116 GAIT TRAINING THERAPY: CPT

## 2023-01-23 PROCEDURE — S0179 MEGESTROL 20 MG: HCPCS | Performed by: INTERNAL MEDICINE

## 2023-01-23 PROCEDURE — 94761 N-INVAS EAR/PLS OXIMETRY MLT: CPT

## 2023-01-23 RX ORDER — MEGESTROL ACETATE 40 MG/ML
400 SUSPENSION ORAL DAILY
Status: DISCONTINUED | OUTPATIENT
Start: 2023-01-23 | End: 2023-01-26 | Stop reason: HOSPADM

## 2023-01-23 RX ORDER — MAGNESIUM SULFATE 1 G/100ML
1 INJECTION INTRAVENOUS ONCE
Status: COMPLETED | OUTPATIENT
Start: 2023-01-23 | End: 2023-01-23

## 2023-01-23 RX ORDER — SODIUM CHLORIDE 9 MG/ML
INJECTION, SOLUTION INTRAVENOUS CONTINUOUS
Status: DISCONTINUED | OUTPATIENT
Start: 2023-01-23 | End: 2023-01-26 | Stop reason: HOSPADM

## 2023-01-23 RX ORDER — ENOXAPARIN SODIUM 100 MG/ML
30 INJECTION SUBCUTANEOUS EVERY 24 HOURS
Status: DISCONTINUED | OUTPATIENT
Start: 2023-01-23 | End: 2023-01-26 | Stop reason: HOSPADM

## 2023-01-23 RX ADMIN — ENOXAPARIN SODIUM 30 MG: 40 INJECTION SUBCUTANEOUS at 05:01

## 2023-01-23 RX ADMIN — MEGESTROL ACETATE 40 MG: 40 TABLET ORAL at 09:01

## 2023-01-23 RX ADMIN — SULFAMETHOXAZOLE AND TRIMETHOPRIM 333.44 MG: 80; 16 INJECTION INTRAVENOUS at 03:01

## 2023-01-23 RX ADMIN — SULFAMETHOXAZOLE AND TRIMETHOPRIM 333.44 MG: 80; 16 INJECTION INTRAVENOUS at 07:01

## 2023-01-23 RX ADMIN — MAGNESIUM SULFATE IN DEXTROSE 1 G: 10 INJECTION, SOLUTION INTRAVENOUS at 10:01

## 2023-01-23 RX ADMIN — SODIUM CHLORIDE: 9 INJECTION, SOLUTION INTRAVENOUS at 10:01

## 2023-01-23 RX ADMIN — FLUCONAZOLE 200 MG: 2 INJECTION, SOLUTION INTRAVENOUS at 09:01

## 2023-01-23 RX ADMIN — MEGESTROL ACETATE 400 MG: 40 SUSPENSION ORAL at 12:01

## 2023-01-23 RX ADMIN — SULFAMETHOXAZOLE AND TRIMETHOPRIM 333.44 MG: 80; 16 INJECTION INTRAVENOUS at 11:01

## 2023-01-23 NOTE — MEDICAL/APP STUDENT
[facility] Medicine Wards Progress Note     Resident Team: [facility] Medicine List 1  Attending Physician: Evelyn Kc MD  Resident: ***  Intern: ***       Subjective:      Brief HPI:  Hanh Hassan is a 27 y.o. male with no significant medical history who presented to [facility] ED on 2023  for blood work following recent ED visit in December. In December, patient was seen in [facility] ED for sore throat, cough, and pain when swallowing solids and liquids. He was discharged and advised to get tested for HIV at the health unit, which he was unable to do so. During this time, he started experiencing weight loss, decreased appetite, nausea, vomiting, fatigue, and night sweats.  Within the last 2 weeks, patient reports fevers, rhinorrhea, and cough productive of white sputum.  Patient denies hemoptysis, chest pain, palpitations, abdominal pain, shortness of breath.  He denies constipation or diarrhea however reports occasional mucus and bright red blood in stools.  He also reports recent bilateral lower extremity weakness requiring assistance ambulating.  Lab significant for leukopenia WBC 1.3, normocytic anemia, hyponatremia, hypophosphatemia, elevated ALP, and positive HIV.  Chest x-ray revealed patchy bilateral lung infiltrates, with dense consolidations in right middle lobe.  In the ED, he was given 1.5 L of normal saline, Zosyn, and Vancomycin.  Internal Medicine was consulted for new HIV diagnosis and pneumonia.    Interval History: NAEON. Pt tolerating jello, chocolate boost, and water w/o n/v, but experiences nausea when attempting to eat food. Has decreased appetite this AM. Denies f/c, CP, palpitations, SOB, diarrhea/constipation.      Review of Systems:  ROS completed and negative except as indicated above.     Objective:     Last 24 Hour Vital Signs:  BP  Min: 110/65  Max: 119/79  Temp  Av.6 °F (36.4 °C)  Min: 97.4 °F (36.3 °C)  Max: 97.9 °F (36.6 °C)  Pulse  Av.9  Min: 67  Max: 77  Resp  Av  Min: 18  Max:  20  SpO2  Av.3 %  Min: 97 %  Max: 98 %  I/O last 3 completed shifts:  In: 5131.2 [P.O.:480; IV Piggyback:4651.2]  Out: 3100 [Urine:3100]    Physical Examination:  General: malnourished, in no acute distress  Eye: PERRLA, EOMI, clear conjunctiva, eyelids normal  HENT: NC/AT, moist mucus membranes  Neck: full range of motion, no palpable lymphadenopathy  Respiratory: clear to auscultation bilaterally, respirations non-labored  Cardiovascular: regular rate and rhythm without murmurs, gallops or rubs  Gastrointestinal: soft, non-tender, non-distended with normal bowel sounds, without masses to palpation  Genitourinary: no CVA tenderness to palpation  Musculoskeletal: no obvious deformities, full range of motion of all extremities/spine without limitation or discomfort  Integumentary: hypopigmented macules/patches on face and scalp; patchy hair loss  Extremities: no LE edema  Neurologic: no signs of peripheral neurological deficit, motor/sensory function intact    Laboratory:  Most Recent Data:  CBC:   Lab Results   Component Value Date    WBC 1.1 (LL) 2023    HGB 10.7 (L) 2023    HCT 33.1 (L) 2023     2023    MCV 79.6 (L) 2023    RDW 16.8 2023     WBC Differential:   Recent Labs   Lab 23  1811 23  0323 23  0415 23  0258 23  0300   WBC 2.0* 1.5* 1.3* 1.2* 1.1*   HGB 11.0* 10.6* 10.9* 10.8* 10.7*   HCT 32.7* 31.8* 32.3* 33.7* 33.1*    182 191 232 245   MCV 79.2* 78.5* 79.0* 79.5* 79.6*     BMP:   Lab Results   Component Value Date     (L) 2023    K 3.9 2023    CO2 21 (L) 2023    BUN 3.6 (L) 2023    CREATININE 0.83 2023    CALCIUM 8.4 2023    MG 1.70 2023    PHOS 3.3 2023     LFTs:   Lab Results   Component Value Date    ALBUMIN 1.8 (L) 2023    BILITOT 0.2 2023    AST 17 2023    ALKPHOS 180 (H) 2023    ALT 10 2023     Coags: No results found for: INR,  PROTIME, PTT  FLP:   Lab Results   Component Value Date    CHOL 56 01/19/2023    HDL 11 (L) 01/19/2023    TRIG 72 01/19/2023     DM:   Lab Results   Component Value Date    HGBA1C 5.5 01/18/2023    CREATININE 0.83 01/23/2023     Thyroid:   Lab Results   Component Value Date    TSH 1.435 01/18/2023     Anemia:   Lab Results   Component Value Date    IRON 7 (L) 01/18/2023    TIBC 125 (L) 01/18/2023    FERRITIN 2,325.58 (H) 01/18/2023     Cardiac:   Lab Results   Component Value Date    TROPONINI <0.010 01/18/2023    BNP 51.8 01/18/2023       Microbiology Data Reviewed: yes  Pertinent Findings:  Microbiology Results (last 7 days)       Procedure Component Value Units Date/Time    Blood Culture [[id number]]  (Normal) Collected: 01/18/23 1425    Order Status: Completed Specimen: Blood from Forearm, Right Updated: 01/22/23 1900     CULTURE, BLOOD ([facility]) No Growth At 96 Hours    Blood Culture [[id number]]  (Normal) Collected: 01/18/23 1402    Order Status: Completed Specimen: Blood from Arm, Right Updated: 01/22/23 1900     CULTURE, BLOOD ([facility]) No Growth At 96 Hours    Respiratory Culture [[id number]]  (Abnormal)  (Susceptibility) Collected: 01/18/23 1608    Order Status: Completed Specimen: Sputum, Expectorated Updated: 01/22/23 1110     Respiratory Culture Moderate Yeast      Moderate Methicillin Sensitive Staphylococcus aureus     Comment: with normal respiratory shree        GRAM STAIN Quality 1+      Many Gram positive cocci      Many Gram Negative Rods      Many Gram Positive Rods      Few Yeast    Urine culture [[id number]] Collected: 01/18/23 1232    Order Status: Completed Specimen: Urine Updated: 01/20/23 0719     Urine Culture No Significant Growth    Chlamydia/GC, PCR [[id number]]  (Normal) Collected: 01/19/23 2235    Order Status: Completed Specimen: Urine Updated: 01/20/23 0215     Chlamydia trachomatis PCR Not Detected     N. gonorrhea PCR Not Detected    Narrative:      The Xpert CT/NG test, performed on the  GeneXpert system is a qualitative in vitro real-time polymerase chain reaction (PCR) test for the automated detected and differentiation for genomic DNA from Chlamydia trachomatis (CT) and/or Neisseria gonorrhoeae (NG).    Cryptococcal antigen, blood [[id number]] Collected: 01/18/23 1916    Order Status: Completed Specimen: Blood, Venous Updated: 01/18/23 1937     CRYPTOCOCCAL ANTIGEN, SERUM ([facility]) Negative     CRYPTOCOCCAL ANTIGEN TITER ([facility]) --    Blood Culture (site 1) [[id number]]     Order Status: Canceled Specimen: Blood     Blood Culture (site 2) [[id number]]     Order Status: Canceled Specimen: Blood     Stool Culture [[id number]]     Order Status: Sent Specimen: Stool              Other Results:  EKG (my interpretation): unchanged from previous tracings.    Radiology:  Imaging Results              CT Abdomen Pelvis With Contrast (Final result)  Result time 01/18/23 14:34:11      Final result by Amairani Cruz MD (01/18/23 14:34:11)                   Impression:      1. Findings concerning for multifocal pneumonia in the lung bases.  2. No acute abnormality of the abdomen or pelvis.      Electronically signed by: Amairani Cruz  Date:    01/18/2023  Time:    14:34               Narrative:    EXAMINATION:  CT ABDOMEN PELVIS WITH CONTRAST    CLINICAL HISTORY:  Flank pain, kidney stone suspected;    TECHNIQUE:  CT imaging was performed of the abdomen and pelvis after the administration of intravenous contrast. Dose length product is 157 mGycm. Automatic exposure control, adjustment of mA/kV or iterative reconstruction technique was used to limit radiation dose.    COMPARISON:  None    FINDINGS:  Liver: Normal.    Gallbladder and biliary tree: No calcified gallstones. No intra or extrahepatic biliary ductal dilation.    Pancreas: Normal.    Spleen: Normal.    Adrenals: Normal.    Kidneys and ureters: There is no obstructing urinary calculus.  There is no hydronephrosis.    Bladder: Normal.    Reproductive  organs: No pelvic masses.    Stomach/bowel: No evidence of bowel obstruction. The appendix is not clearly identified.  No discernible bowel inflammation.    Lymph nodes: No pathologically enlarged lymph node identified.    Peritoneum: No ascites or free air. No fluid collection.    Vessels: No abdominal aortic aneurysm.    Abdominal wall: Normal.    Lung bases: There are consolidative changes in the right lower and middle lobes with additional scattered bibasilar airspace opacities.    Bones: No acute osseous findings.                                       X-Ray Chest PA And Lateral (Final result)  Result time 01/18/23 13:59:35      Final result by Tom Quezada MD (01/18/23 13:59:35)                   Impression:      Bilateral lungs infiltrates.      Electronically signed by: Tom Quezada  Date:    01/18/2023  Time:    13:59               Narrative:    EXAMINATION:  XR CHEST PA AND LATERAL    CLINICAL HISTORY:  SV;    TECHNIQUE:  Two views    COMPARISON:  December 6, 2022.    FINDINGS:  Cardiopericardial silhouette is within normal limits.  There is dense consolidation which involves the right middle lung lobe.  There are additional patchy infiltrates which involve the right lower lung lobe and to a lesser degree the left lower lung lobe.  No pulmonary edema or pneumothorax.  No fluid accumulation within the pleural spaces.                        ED Interpretation by Delfino Mishra MD (01/18/23 13:56:26, [facility], Emergency Medicine)    Signs of pneumonia in the right lower lobe.  No cardiomegaly or pneumothorax                                    Current Medications:     Infusions:       Scheduled:   enoxaparin  40 mg Subcutaneous Daily    fluconazole (DIFLUCAN) IV (PEDS and ADULTS)  200 mg Intravenous Q24H    magnesium sulfate IVPB  1 g Intravenous Once    megestroL  40 mg Oral Daily    sodium phosphate IVPB  15 mmol Intravenous Once    trimethoprim-sulfamethoxasole (BACTRIM)  IVPB (for non fluid restricted pts) (fixed ratio)  20 mg/kg/day Intravenous Q8H        PRN:  sodium chloride, acetaminophen, dextrose 10%, dextrose 10%, glucagon (human recombinant), glucose, glucose, naloxone, ondansetron, sodium chloride 0.9%    Antibiotics and Day Number of Therapy:  Bactrim Day 4  Fluconazole Day 2    Lines and Day Number of Therapy:  PIV    Assessment & Plan:     HIV  Oral candidiasis   - HIV 1/2 Ag/Ab reactive   - CD4 Count 7.9; Viral Load 340,000  - Hepatitis panel negative; cryptococcus negative  - Gonorrhea/Chlamydia negative   - Syphilis Ab reactive, RPR reactive. Treated at [facility] with single dose penicillin G for secondary syphilis on 12/19/21. Contacted Infectious disease and confirmed completed treatment for secondary syphilis on 12/2021. Initial ; repeat was 64  - Toxoplasma pending   - Continue IV fluconazole 200 mg QD for total of 14 days  - Consider opportunistic infection ppx   - Speech therapy consult placed for swallow study, recommends thin liquids and regular consistencies; consider EGD    CAP  Suspected PJP  Sepsis   - SIRS 3/4: leukopenia, tachycardia, fever with respiratory source   - Chest XR revealed patchy bilateral infiltrates with dense consolidation in right middle lobe   - COVID/FLU PCR negative  - Fungitell positive, 185  - PJP PCR, legionella pending   - WBC 1.1 this AM from 1.2    - Neutropenic precautions   - Respiratory Cx positive for moderate yeast and MSSA  - Continue Bactrim for PJP infection   - Continue IVF with NS 75 mL/hr    Hyponatremia  - Sodium 131 this AM from 135  - Serum osmolality 272, urine osmolality wnl, urine Na <20  - Stool studies, Legionella,Giardia/Cryptosporidium pending    - Will continue to monitor and replete as necessary    Normocytic anemia   - H/H 10.7/33.1 this AM from 10.8/33.7  - Ordered FOBT given hx of hematochezia; patient still has not had BM  - Continue to monitor H/H for further drop and need for  intervention    Malnutrition   - Consulted Nutrition  - Consult GI given pt's reported hx of hematochezia  - Continue neutropenic diet and encourage oral intake   - Continue chocolate boost TID   - Continue megace 40 mg qd to stimulate appetite per recommendation    CODE STATUS: FULL   Access: PIV   Antibiotics: bactrim, fluconazole   Diet: neutropenic   DVT Prophylaxis: lovenox   GI Prophylaxis: N/A  Fluids: NS 75 mL/hr      Disposition: Continue abx tx. F/u infectious w/u. D/c pending pt's ability to tolerate oral intake and ambulate safely.      Phoenix Hwaung, MS4  [facility] Medical Student

## 2023-01-23 NOTE — PLAN OF CARE
Problem: Adult Inpatient Plan of Care  Goal: Plan of Care Review  Outcome: Ongoing, Progressing  Flowsheets (Taken 1/23/2023 1727)  Plan of Care Reviewed With:   patient   spouse     Problem: Skin Injury Risk Increased  Goal: Skin Health and Integrity  Outcome: Ongoing, Progressing  Intervention: Promote and Optimize Oral Intake  Flowsheets (Taken 1/23/2023 1727)  Oral Nutrition Promotion:   calorie-dense foods provided   safe use of adaptive equipment encouraged   medicated   nutritional therapy counseling provided     Problem: Adult Inpatient Plan of Care  Goal: Absence of Hospital-Acquired Illness or Injury  Intervention: Identify and Manage Fall Risk  Flowsheets (Taken 1/23/2023 1727)  Safety Promotion/Fall Prevention:   assistive device/personal item within reach   Fall Risk reviewed with patient/family   medications reviewed   side rails raised x 2   Fall Risk signage in place

## 2023-01-23 NOTE — PROGRESS NOTES
[facility]  Internal Medicine  Progress Note      Patient Name: Hanh Hassan  : 1995  MRN: [id number]  Patient Class: IP- Inpatient   Admission Date: 2023   Length of Stay: 5  Admitting Service: Hospital Medicine  Attending Physician: Evelyn Kc MD   Senior Resident: Pietro Recinos MD   Intern: Cecile Kim MD   PCP: Primary Doctor No  Source of history: Patient, patient's family, and EMR.   Code status: Full     Chief Complaint   Fatigue and Weight Loss (Pt reports generalized weakness, fatigue, and weight loss over the past month. )      History of Present Illness   Hanh Hassan is a 27 y.o. male with no significant medical history who presented to [facility] ED on 2023  for blood work following recent ED visit in December.  In December, patient was seen in [facility] ED for sore throat, cough, and pain when swallowing solids and liquids. He was discharged and advised to get tested for HIV at the health unit, which he was unable to do so. During this time, he started experiencing weight loss, decreased appetite, nausea, vomiting, fatigue, and night sweats.  Within the last 2 weeks, patient reports fevers, rhinorrhea, and cough productive of white sputum.  Patient denies hemoptysis, chest pain, palpitations, abdominal pain, shortness of breath.  He denies constipation or diarrhea however reports occasional mucus and bright red blood in stools.  He also reports recent bilateral lower extremity weakness requiring assistance ambulating.      ED Course: Lab significant for leukopenia WBC 1.3, normocytic anemia, hyponatremia, hypophosphatemia, elevated ALP, and positive HIV.  Chest x-ray revealed patchy bilateral lung infiltrates, with dense consolidations in right middle lobe.  In the ED, he was given 1.5 L of normal saline, Zosyn, and Vancomycin.  Internal Medicine was consulted for new HIV diagnosis and pneumonia.    Interval History    NAEON. VSS. AF. Patient tolerating Ensure  drinks and ate some grits to eat earlier today. Still tolerating sips of water. Mother at bedside encouraging PO intake. Endorses continued nausea but has improved compared to yesterday. Feels better overall in comparison to admission day. Denies fever, chills, fatigue, weakness, CP, racing heart, SOB, trouble breathing, diarrhea, vomiting, and constipation. WBC downtrending to 1.1 and H/H downtrending to 10.7/33.1 on CBC.  Na downtrending to 131 and CO2 21. Respiratory cultures positive for methicillin sensitive staph aureus. Repleted Mg this AM.   ROS   Pertinent positive and negative as mentioned in HPI     Past Medical History   History reviewed. No pertinent past medical history.    Past Surgical History   History reviewed. No pertinent surgical history.    Social History     Social History     Tobacco Use    Smoking status: Never    Smokeless tobacco: Never   Substance Use Topics    Alcohol use: Not on file        Family History   Reviewed and noncontributory    Allergies   Latex and Latex, natural rubber    Home Medications     Prior to Admission medications    Medication Sig Start Date End Date Taking? Authorizing Provider   aluminum & magnesium hydroxide-simethicone (MYLANTA MAX STRENGTH) 400-400-40 mg/5 mL suspension Take 10 mLs by mouth every 6 (six) hours as needed for Indigestion.   Yes Historical Provider        Inpatient Medications   Scheduled Meds   enoxaparin  30 mg Subcutaneous Daily    fluconazole (DIFLUCAN) IV (PEDS and ADULTS)  200 mg Intravenous Q24H    megestroL  400 mg Oral Daily    sodium phosphate IVPB  15 mmol Intravenous Once    trimethoprim-sulfamethoxasole (BACTRIM) IVPB (for non fluid restricted pts) (fixed ratio)  20 mg/kg/day Intravenous Q8H     Continuous Infusions   sodium chloride 0.9% 75 mL/hr at 01/23/23 1045     PRN Meds  sodium chloride, acetaminophen, dextrose 10%, dextrose 10%, glucagon (human recombinant), glucose, glucose, naloxone, ondansetron, sodium chloride  0.9%    Physical Exam   Vital Signs  Temp:  [97.3 °F (36.3 °C)-97.9 °F (36.6 °C)]   Pulse:  [67-73]   Resp:  [18-20]   BP: (111-113)/(70-75)   SpO2:  [95 %-98 %]       General: Appears comfortable  HEENT: NC/AT  Neck:  No JVD  Chest: CTABL  CVS: Regular rhythm. Normal S1/S2.  Abdomen: nondistended, normoactive BS, soft and non-tender.  MSK: No obvious deformity or joint swelling, spontaneous movement of extremities   Skin: Warm and dry  Neuro: AAOx3, no focal neurological deficit  Psych: Cooperative    Labs     Recent Labs     01/22/23  0258 01/23/23  0300   WBC 1.2* 1.1*   RBC 4.24* 4.16*   HGB 10.8* 10.7*   HCT 33.7* 33.1*   MCV 79.5* 79.6*   MCH 25.5 25.7   MCHC 32.0* 32.3*   RDW 16.9 16.8    245     No results for input(s): LACTIC in the last 72 hours.  No results for input(s): INR, APTT, D-DIMER in the last 72 hours.  No results for input(s): HGBA1C, CHOL, TRIG, LDL, VLDL, HDL in the last 72 hours.  No results for input(s): PH, PCO2, PO2, HCO3, POCSATURATED, BE in the last 72 hours.    Recent Labs     01/22/23  0258 01/23/23  0300   * 131*   K 3.5 3.9   CHLORIDE 104 102   CO2 22 21*   BUN 3.5* 3.6*   CREATININE 0.69* 0.83   GLUCOSE 56* 73*   CALCIUM 8.1* 8.4   MG 2.00 1.70   PHOS 2.6 3.3   ALBUMIN 1.7* 1.8*   GLOBULIN 4.9* 5.1*   ALKPHOS 186* 180*   ALT 11 10   AST 17 17   BILITOT 0.3 0.2     No results for input(s): BNP, CPK, TROPONINI in the last 72 hours.       Microbiology Results (last 7 days)       Procedure Component Value Units Date/Time    Blood Culture [[id number]]  (Normal) Collected: 01/18/23 1425    Order Status: Completed Specimen: Blood from Forearm, Right Updated: 01/22/23 1900     CULTURE, BLOOD ([facility]) No Growth At 96 Hours    Blood Culture [[id number]]  (Normal) Collected: 01/18/23 1402    Order Status: Completed Specimen: Blood from Arm, Right Updated: 01/22/23 1900     CULTURE, BLOOD ([facility]) No Growth At 96 Hours    Respiratory Culture [[id number]]  (Abnormal)  (Susceptibility)  Collected: 01/18/23 1608    Order Status: Completed Specimen: Sputum, Expectorated Updated: 01/22/23 1110     Respiratory Culture Moderate Yeast      Moderate Methicillin Sensitive Staphylococcus aureus     Comment: with normal respiratory shree        GRAM STAIN Quality 1+      Many Gram positive cocci      Many Gram Negative Rods      Many Gram Positive Rods      Few Yeast    Urine culture [[id number]] Collected: 01/18/23 1232    Order Status: Completed Specimen: Urine Updated: 01/20/23 0719     Urine Culture No Significant Growth    Chlamydia/GC, PCR [[id number]]  (Normal) Collected: 01/19/23 2235    Order Status: Completed Specimen: Urine Updated: 01/20/23 0215     Chlamydia trachomatis PCR Not Detected     N. gonorrhea PCR Not Detected    Narrative:      The Xpert CT/NG test, performed on the GeneDigitalVisionpert system is a qualitative in vitro real-time polymerase chain reaction (PCR) test for the automated detected and differentiation for genomic DNA from Chlamydia trachomatis (CT) and/or Neisseria gonorrhoeae (NG).    Cryptococcal antigen, blood [[id number]] Collected: 01/18/23 1916    Order Status: Completed Specimen: Blood, Venous Updated: 01/18/23 1937     CRYPTOCOCCAL ANTIGEN, SERUM ([facility]) Negative     CRYPTOCOCCAL ANTIGEN TITER ([facility]) --    Blood Culture (site 1) [[id number]]     Order Status: Canceled Specimen: Blood     Blood Culture (site 2) [[id number]]     Order Status: Canceled Specimen: Blood     Stool Culture [[id number]]     Order Status: Sent Specimen: Stool            Imaging     CT Abdomen Pelvis With Contrast   Final Result      1. Findings concerning for multifocal pneumonia in the lung bases.   2. No acute abnormality of the abdomen or pelvis.         Electronically signed by: Amairani Cruz   Date:    01/18/2023   Time:    14:34      X-Ray Chest PA And Lateral   ED Interpretation   Signs of pneumonia in the right lower lobe.  No cardiomegaly or pneumothorax      Final Result      Bilateral lungs  infiltrates.         Electronically signed by: Tom Quezada   Date:    01/18/2023   Time:    13:59        Assessment & Plan   PLAN:  HIV  Oral candidiasis   - HIV 1/2 Ag/Ab reactive   - Hepatitis panel negative, Cryptococcus negative  - Gonorrhea/Chlamydia negative   - Syphilis Ab reactive, RPR reactive. Treated at [facility] with single dose penicillin G for secondary syphilis on 12/19/21. Contacted Infectious disease and confirmed completed treatment for secondary syphilis on 12/2021. Initial ; repeat was 64  - Respiratory culture positive for moderate Yeast  - Continue IV fluconazole 200 mg qd for total of 14 days - started on 1/20/23, ending 2/04/23   - CD4 count 7.9; viral load of 340,000  - Will consider opportunistic infection prophylaxis   - Speech therapy consult placed for swallow study, recommends thin liquids and regular consistencies; consider EGD     Community Acquired PNA  Suspected PJP  Sepsis   - SIRS 3/4: leukopenia, tachycardia, fever with respiratory source   - Chest xray revealed patchy bilateral infiltrates with dense consolidation  in right middle lobe   - COVID/FLU PCR negative  - WBC 1.1 from 1.2 today   - Neutropenic precautions   - Respiratory culture positive for methicillin sensitive staph. Aureus   - Blood culture no growth at 96 hours   - Will discontinue Zosyn and Vanc, done on 1/22/23. Continue Bactrim for PJP infection    - PJP PCR pending, Fungitell positive      Hyponatremia  Hypophosphatemia   - Sodium decreased this AM  - Serum osmolality 272, urine osmolality wnl, urine Na <20  - Stool studies, Legionella,Giardia/Cryptosporidium pending    - Will continue to monitor and replete as necessary, keep K>4, Phos>3, Mg>2   - Restarted NS at 75cc/hr due to decreased PO intake      Normocytic anemia   - H/H stable this AM   - Ordered FOBT given hx of hematochezia; patient still has not had bowel movement  - Continue to monitor H/H for further drop and need for intervention      Malnutrition   - Consulted Nutrition  - Continue neutropenic diet and encourage oral intake   - Continue chocolate boost TID, tolerating PO intake   - Increased to megace 400 mg qd to stimulate appetite per recommendation          Access: Peripheral   Antibiotics: Bactrim   Diet: Regular as tolerated    DVT Prophylaxis: Lovenox 30mg qd   GI Prophylaxis: None   Fluids: NS 75 ml/hr       Disposition: Continue Bactrim for PJP infection. Respiratory culture positive for MSSA. Continue infectious disease workup for newly diagnosed HIV. Will discharge patient once able to tolerate PO diet to f/u on outpt infectious disease       Cecile Kim MD  [facility], [facility]

## 2023-01-23 NOTE — PT/OT/SLP PROGRESS
Physical Therapy Treatment    Patient Name:  Hanh Hassan   MRN:  [id number]    Recommendations:     Discharge Recommendations:  home health PT   Discharge Equipment Recommendations: walker, rolling   Barriers to discharge: Endurance    Assessment:     Hanh Hassan is a 27 y.o. male admitted with a medical diagnosis of <principal problem not specified>.  He presents with the following impairments/functional limitations:  weakness, impaired endurance, impaired functional mobility, gait instability, impaired balance, pain, decreased lower extremity function .    Rehab Prognosis: Good; patient would benefit from acute skilled PT services to address these deficits and reach maximum level of function.    Recent Surgery: * No surgery found *      Plan:     During this hospitalization, patient to be seen  (3-5xwk) to address the identified rehab impairments via gait training, therapeutic activities, therapeutic exercises and progress toward the following goals:    Plan of Care Expires:  02/16/23    Subjective     Chief Complaint: pain in his legs  Patient/Family Comments/goals: return home  Pain/Comfort:  Pain Rating 1: 5/10  Pain Addressed 1: Reposition      Objective:     Communicated with nurse Alley prior to session.  Patient found HOB elevated with peripheral IV upon PT entry to room.     General Precautions: Standard, neutropenic, fall   Orthopedic Precautions:N/A   Braces:    Respiratory Status: Room air     Functional Mobility:  Bed Mobility:     Supine to Sit: modified independence  Transfers:     Sit to Stand:  stand by assistance with rolling walker  Gait: Pt. Required min A to ambulate 130 ft with his RW in ndiaye. Pt. Moved slowly, loss of balance observed especially with sit to stand transfer and when turning.  Balance: FAIR to FAIR minus  Pt. Performed sit to stand transfer training. Pt. Pulled on RW at first, needed cues for balance and center of gravity advancement.Pt. was then able to perform sit to  stand with SBA only.    Patient left supine with all lines intact and call button in reach..    GOALS:   Multidisciplinary Problems       Physical Therapy Goals          Problem: Physical Therapy    Goal Priority Disciplines Outcome Goal Variances Interventions   Physical Therapy Goal     PT, PT/OT Ongoing, Progressing     Description: Goals to be met by: Discharge     Patient will increase functional independence with mobility by performing:    -. Supine to sit with Modified Dawson-ONGOING  -. Sit to supine with Modified Dawson-ONGOING  -. Sit to stand transfer with Supervision -ONGOING  -. Gait  x 260 feet with Stand-by Assistance using Rolling Walker. -ONGOING                         Time Tracking:     PT Received On: 01/23/23  PT Start Time: 1047     PT Stop Time: 1110  PT Total Time (min): 23 min     Billable Minutes: Gait Training 15 and Therapeutic Activity 8    Treatment Type: Treatment  PT/PTA: PT     PTA Visit Number: 0     01/23/2023

## 2023-01-23 NOTE — PROGRESS NOTES
Inpatient Nutrition Assessment    Admit Date: 1/18/2023   Total duration of encounter: 5 days     Nutrition Recommendation/Prescription     Continue Neutropenic diet (encourage oral intake): Snacks between meals  Continue Boost Plus (provides 360 kcal, 14 g protein per serving) TID  Megace to stimulate appetite  Suggest MVI  Biweekly wt  Medical management of nausea  If pt remains with poor po intake, Consider supplemental PPN to help meet nutrient needs: Clinimix 4.25/5 @ 70ml/hr with lipids 20% 250 ml every other day to provide 821 calories, 71 gm protein.     Communication of Recommendations: reviewed with patient/caregiver and reviewed with nurse    Nutrition Assessment     Malnutrition Assessment/Nutrition-Focused Physical Exam    Malnutrition in the context of acute illness or injury  Degree of Malnutrition: severe malnutrition  Energy Intake: </= 50% of estimated energy requirement for >/= 5 days  Interpretation of Weight Loss: >7.5% in 3 months  Body Fat:moderate depletion  Area of Body Fat Loss: orbital region  and upper arm region - triceps / biceps  Muscle Mass Loss: moderate depletion  Area of Muscle Mass Loss: temple region - temporalis muscle, clavicle bone region - pectoralis major, deltoid, trapezius muscles, clavicle and acromion bone region - deltoid muscle, and scapular bone region - trapezius, supraspinus, infraspinus muscles  Fluid Accumulation: does not meet criteria  Edema: no edema present   Reduced  Strength: unable to obtain  A minimum of two characteristics is recommended for diagnosis of either severe or non-severe malnutrition.    Chart Review    Reason Seen: continuous nutrition monitoring and physician consult for eval/treat     Malnutrition Screening Tool Results   Have you recently lost weight without trying?: Yes: 34 lbs or more  Have you been eating poorly because of a decreased appetite?: Yes   MST Score: 5     Diagnosis:  HIV, oral candidiasis, PNA, suspected PJP, sepsis,  "hyponatremia, hypophosphatemia, anemia     Relevant Medical History: none     Nutrition-Related Medications: Diflucan, Mag Sulfate, Megace initiated on   Calorie Containing IV Medications: no significant kcals from medications at this time    Nutrition-Related Labs:  (-) H/H 6.7/20.7(L) Gluc 126(H) Bun 8.2 Cr 0.6 Alk Phos 215(H) K 3.1(L)   23 -- H/H 10/33 L, Glu 73 L, BUN 3.6 L, Cr 0.8 L, K 3.9    Diet/PN Order: Diet Neutropenic  Oral Supplement Order: Boost Plus  Tube Feeding Order: none  Appetite/Oral Intake: poor/25-50% of meals  Factors Affecting Nutritional Intake: decreased appetite, nausea, sore mouth, and vomiting  Food/Jain/Cultural Preferences:  chocolate boost   Food Allergies: none reported    Skin Integrity: intact, abrasion  Wound(s):   as above     Comments    23 -- Pt continues with <50% meal intake, drinking "some" Boost Plus - encouraged Boost/snacks between meals; Megace initiated on  noted; denies n/v this am, reports last emesis yesterday, intermittent nausea; LBM documented on  -- Received  consult for eval; pt reported has not been eating well approx month; had mouth/throat ulcers; currently on nystatin for candidiasis; pt with + reported wt loss--approx 35# over couple months; pt with + severe muscle/fat wasting; new Dx HIV. Will order oral supplement; also suggest megace to stimulate appetite; PPN recs included if pt unable to tolerate oral diet.     Anthropometrics    Height: 5' 6.14" (168 cm) Height Method: Stated  Last Weight: 50 kg (110 lb 3.7 oz) (23 1129) Weight Method: Standard Scale  BMI (Calculated): 17.7  BMI Classification: underweight (BMI less than 18.5)        Ideal Body Weight (IBW), Male: 142.84 lb     % Ideal Body Weight, Male (lb): 77.17 %                 Usual Body Weight (UBW), k.9 kg (pt reported UBW 65.9kg; + wt loss --unsure exact timeframe--? couple months)  % Usual Body Weight: 76.03     Usual Weight Provided By: " patient and EMR weight history    Wt Readings from Last 5 Encounters:   01/18/23 50 kg (110 lb 3.7 oz)   12/06/22 50.1 kg (110 lb 7.2 oz)     Weight Change(s) Since Admission:  Admit Weight: 50 kg (110 lb 3.7 oz) (01/18/23 1129)  Pt stated #   1/23/23 -- no new wt    Estimated Needs    Weight Used For Calorie Calculations: 50 kg (110 lb 3.7 oz)  Energy Calorie Requirements (kcal): 1750 kcal/d; 35 arpit/kg wt gain  Energy Need Method: Kcal/kg  Weight Used For Protein Calculations: 50 kg (110 lb 3.7 oz)  Protein Requirements: 70 gm protein/d; 1.4 gm/kg  Fluid Requirements (mL): 1750ml/d; 1ml/arpit  Temp: 97.3 °F (36.3 °C)       Enteral Nutrition    Patient not receiving enteral nutrition at this time.    Parenteral Nutrition    Patient not receiving parenteral nutrition support at this time.    Evaluation of Received Nutrient Intake    Calories: not meeting estimated needs  Protein: not meeting estimated needs    Patient Education    Not applicable.    Nutrition Diagnosis     PES: Malnutrition related to new Dx HIV  as evidenced by eating < 50% meals several weeks; 45# reported wt loss; + severe muscle/fat wasting; BMI 17. . (continues)    Interventions/Goals     Intervention(s): general/healthful diet, commercial beverage, multivitamin/mineral supplement therapy, prescription medication, and collaboration with other providers  Goal: Meet greater than 75% of nutritional needs by follow-up. (goal progressing)    Monitoring & Evaluation     Dietitian will monitor food and beverage intake, weight, and glucose/endocrine profile.  Nutrition Risk/Follow-Up: high (follow-up in 1-4 days)   Please consult if re-assessment needed sooner.

## 2023-01-24 LAB
ABS NEUT CALC (OHS): 0.35 X10(3)/MCL (ref 2.1–9.2)
ALBUMIN SERPL-MCNC: 1.9 G/DL (ref 3.5–5)
ALBUMIN/GLOB SERPL: 0.4 RATIO (ref 1.1–2)
ALP SERPL-CCNC: 164 UNIT/L (ref 40–150)
ALT SERPL-CCNC: 11 UNIT/L (ref 0–55)
AST SERPL-CCNC: 17 UNIT/L (ref 5–34)
BILIRUBIN DIRECT+TOT PNL SERPL-MCNC: 0.2 MG/DL
BUN SERPL-MCNC: 4.1 MG/DL (ref 8.9–20.6)
CALCIUM SERPL-MCNC: 8.4 MG/DL (ref 8.4–10.2)
CHLORIDE SERPL-SCNC: 104 MMOL/L (ref 98–107)
CO2 SERPL-SCNC: 21 MMOL/L (ref 22–29)
CREAT SERPL-MCNC: 0.81 MG/DL (ref 0.73–1.18)
ERYTHROCYTE [DISTWIDTH] IN BLOOD BY AUTOMATED COUNT: 17.2 % (ref 11.5–17)
GFR SERPLBLD CREATININE-BSD FMLA CKD-EPI: >60 MLS/MIN/1.73/M2
GLOBULIN SER-MCNC: 5 GM/DL (ref 2.4–3.5)
GLUCOSE SERPL-MCNC: 64 MG/DL (ref 74–100)
HCT VFR BLD AUTO: 32.8 % (ref 42–52)
HEMATOLOGIST REVIEW: NORMAL
HGB BLD-MCNC: 10.7 GM/DL (ref 14–18)
IMM GRANULOCYTES # BLD AUTO: 0.01 X10(3)/MCL (ref 0–0.04)
IMM GRANULOCYTES NFR BLD AUTO: 1 %
LYMPHOCYTES NFR BLD MANUAL: 0.37 X10(3)/MCL
LYMPHOCYTES NFR BLD MANUAL: 37 % (ref 13–40)
MAGNESIUM SERPL-MCNC: 1.9 MG/DL (ref 1.6–2.6)
MCH RBC QN AUTO: 26.1 PG
MCHC RBC AUTO-ENTMCNC: 32.6 MG/DL (ref 33–36)
MCV RBC AUTO: 80 FL (ref 80–94)
METAMYELOCYTES NFR BLD MANUAL: 3 %
MONOCYTES NFR BLD MANUAL: 0.29 X10(3)/MCL (ref 0.1–1.3)
MONOCYTES NFR BLD MANUAL: 29 % (ref 2–11)
NEUTROPHILS NFR BLD MANUAL: 32 % (ref 47–80)
NRBC BLD AUTO-RTO: 0 %
PHOSPHATE SERPL-MCNC: 3.2 MG/DL (ref 2.3–4.7)
PLATELET # BLD AUTO: 236 X10(3)/MCL (ref 130–400)
PMV BLD AUTO: 11.2 FL (ref 7.4–10.4)
POTASSIUM SERPL-SCNC: 4.1 MMOL/L (ref 3.5–5.1)
PROT SERPL-MCNC: 6.9 GM/DL (ref 6.4–8.3)
RBC # BLD AUTO: 4.1 X10(6)/MCL (ref 4.7–6.1)
SODIUM SERPL-SCNC: 132 MMOL/L (ref 136–145)
T GONDII IGG SER QL IA: NEGATIVE
T GONDII IGG SER-ACNC: <3 IU/ML
T GONDII IGM SERPL QL IA: NEGATIVE
WBC # SPEC AUTO: 1 X10(3)/MCL (ref 4.5–11.5)

## 2023-01-24 PROCEDURE — 25000003 PHARM REV CODE 250: Performed by: INTERNAL MEDICINE

## 2023-01-24 PROCEDURE — 84100 ASSAY OF PHOSPHORUS: CPT

## 2023-01-24 PROCEDURE — 94761 N-INVAS EAR/PLS OXIMETRY MLT: CPT

## 2023-01-24 PROCEDURE — 36415 COLL VENOUS BLD VENIPUNCTURE: CPT

## 2023-01-24 PROCEDURE — 83735 ASSAY OF MAGNESIUM: CPT

## 2023-01-24 PROCEDURE — 63600175 PHARM REV CODE 636 W HCPCS

## 2023-01-24 PROCEDURE — 85027 COMPLETE CBC AUTOMATED: CPT

## 2023-01-24 PROCEDURE — 25000003 PHARM REV CODE 250

## 2023-01-24 PROCEDURE — S0179 MEGESTROL 20 MG: HCPCS | Performed by: INTERNAL MEDICINE

## 2023-01-24 PROCEDURE — 97535 SELF CARE MNGMENT TRAINING: CPT

## 2023-01-24 PROCEDURE — 63600175 PHARM REV CODE 636 W HCPCS: Performed by: STUDENT IN AN ORGANIZED HEALTH CARE EDUCATION/TRAINING PROGRAM

## 2023-01-24 PROCEDURE — 97116 GAIT TRAINING THERAPY: CPT

## 2023-01-24 PROCEDURE — 27000207 HC ISOLATION

## 2023-01-24 PROCEDURE — 21400001 HC TELEMETRY ROOM

## 2023-01-24 PROCEDURE — S0039 INJECTION, SULFAMETHOXAZOLE: HCPCS

## 2023-01-24 PROCEDURE — 80053 COMPREHEN METABOLIC PANEL: CPT

## 2023-01-24 RX ORDER — SULFAMETHOXAZOLE AND TRIMETHOPRIM 800; 160 MG/1; MG/1
2 TABLET ORAL 3 TIMES DAILY
Status: DISCONTINUED | OUTPATIENT
Start: 2023-01-24 | End: 2023-01-26 | Stop reason: HOSPADM

## 2023-01-24 RX ORDER — SULFAMETHOXAZOLE AND TRIMETHOPRIM 800; 160 MG/1; MG/1
2 TABLET ORAL 2 TIMES DAILY
Status: DISCONTINUED | OUTPATIENT
Start: 2023-01-24 | End: 2023-01-24

## 2023-01-24 RX ORDER — AZITHROMYCIN 600 MG/1
1200 TABLET, FILM COATED ORAL WEEKLY
Status: DISCONTINUED | OUTPATIENT
Start: 2023-01-24 | End: 2023-01-26 | Stop reason: HOSPADM

## 2023-01-24 RX ADMIN — SULFAMETHOXAZOLE AND TRIMETHOPRIM 2 TABLET: 800; 160 TABLET ORAL at 08:01

## 2023-01-24 RX ADMIN — SULFAMETHOXAZOLE AND TRIMETHOPRIM 2 TABLET: 800; 160 TABLET ORAL at 03:01

## 2023-01-24 RX ADMIN — AZITHROMYCIN 1200 MG: 600 TABLET, FILM COATED ORAL at 10:01

## 2023-01-24 RX ADMIN — SULFAMETHOXAZOLE AND TRIMETHOPRIM 333.44 MG: 80; 16 INJECTION INTRAVENOUS at 09:01

## 2023-01-24 RX ADMIN — ENOXAPARIN SODIUM 30 MG: 40 INJECTION SUBCUTANEOUS at 05:01

## 2023-01-24 RX ADMIN — FLUCONAZOLE 200 MG: 2 INJECTION, SOLUTION INTRAVENOUS at 09:01

## 2023-01-24 RX ADMIN — MEGESTROL ACETATE 400 MG: 40 SUSPENSION ORAL at 09:01

## 2023-01-24 NOTE — PROGRESS NOTES
[facility]  Internal Medicine  Progress Note      Patient Name: Hanh Hassan  : 1995  MRN: [id number]  Patient Class: IP- Inpatient   Admission Date: 2023   Length of Stay: 6  Admitting Service: Hospital Medicine  Attending Physician: Evelyn Kc MD   Senior Resident: Pietro Recinos MD   Intern: Cecile Kim MD   PCP: Primary Doctor No  Source of history: Patient, patient's family, and EMR.   Code status: Full     Chief Complaint   Fatigue and Weight Loss (Pt reports generalized weakness, fatigue, and weight loss over the past month. )      History of Present Illness   Hanh Hassan is a 27 y.o. male with no significant medical history who presented to [facility] ED on 2023  for blood work following recent ED visit in December.  In December, patient was seen in [facility] ED for sore throat, cough, and pain when swallowing solids and liquids. He was discharged and advised to get tested for HIV at the health unit, which he was unable to do so. During this time, he started experiencing weight loss, decreased appetite, nausea, vomiting, fatigue, and night sweats.  Within the last 2 weeks, patient reports fevers, rhinorrhea, and cough productive of white sputum.  Patient denies hemoptysis, chest pain, palpitations, abdominal pain, shortness of breath.  He denies constipation or diarrhea however reports occasional mucus and bright red blood in stools.  He also reports recent bilateral lower extremity weakness requiring assistance ambulating.      ED Course: Lab significant for leukopenia WBC 1.3, normocytic anemia, hyponatremia, hypophosphatemia, elevated ALP, and positive HIV.  Chest x-ray revealed patchy bilateral lung infiltrates, with dense consolidations in right middle lobe.  In the ED, he was given 1.5 L of normal saline, Zosyn, and Vancomycin.  Internal Medicine was consulted for new HIV diagnosis and pneumonia.    Interval History    NAEON. VSS. AF. Though pt is increasingly  tolerating PO intake, still inadequate according to nutrition, rec include PPN Clinimix 4.25/5 @ 70ml/hr + lipid 20% 250ml every other day. Still tolerating sips of water. Currently underweight by 32lbs. Mother at bedside encouraging PO intake. Endorses continued nausea but has improved. Feels better overall in comparison to admission day. Denies fever, chills, fatigue, weakness, CP, racing heart, SOB, trouble breathing, diarrhea, vomiting, and constipation. No complaints at this time. WBC downtrending to 1.0 and H/H downtrending to 10.7/32.8 on CBC.  Na slightly uptrended to 132 and CO2 21. Respiratory cultures positive for methicillin sensitive staph aureus.   ROS   Pertinent positive and negative as mentioned in HPI     Past Medical History   History reviewed. No pertinent past medical history.    Past Surgical History   History reviewed. No pertinent surgical history.    Social History     Social History     Tobacco Use    Smoking status: Never    Smokeless tobacco: Never   Substance Use Topics    Alcohol use: Not on file        Family History   Reviewed and noncontributory    Allergies   Latex and Latex, natural rubber    Home Medications     Prior to Admission medications    Medication Sig Start Date End Date Taking? Authorizing Provider   aluminum & magnesium hydroxide-simethicone (MYLANTA MAX STRENGTH) 400-400-40 mg/5 mL suspension Take 10 mLs by mouth every 6 (six) hours as needed for Indigestion.   Yes Historical Provider        Inpatient Medications   Scheduled Meds   enoxaparin  30 mg Subcutaneous Daily    fluconazole (DIFLUCAN) IV (PEDS and ADULTS)  200 mg Intravenous Q24H    megestroL  400 mg Oral Daily    sodium phosphate IVPB  15 mmol Intravenous Once    trimethoprim-sulfamethoxasole (BACTRIM) IVPB (for non fluid restricted pts) (fixed ratio)  20 mg/kg/day Intravenous Q8H     Continuous Infusions   sodium chloride 0.9% 75 mL/hr at 01/23/23 1045     PRN Meds  sodium chloride, acetaminophen, dextrose  10%, dextrose 10%, glucagon (human recombinant), glucose, glucose, naloxone, ondansetron, sodium chloride 0.9%    Physical Exam   Vital Signs  Temp:  [97.6 °F (36.4 °C)-97.7 °F (36.5 °C)]   Pulse:  [68-74]   Resp:  [18]   BP: (102-112)/(61-73)   SpO2:  [98 %-99 %]       General: Appears comfortable  HEENT: NC/AT  Neck:  No JVD  Chest: CTABL  CVS: Regular rhythm. Normal S1/S2.  Abdomen: nondistended, normoactive BS, soft and non-tender.  MSK: No obvious deformity or joint swelling, spontaneous movement of extremities   Skin: Warm and dry  Neuro: AAOx3, no focal neurological deficit  Psych: Cooperative    Labs     Recent Labs     01/23/23  0300 01/24/23 0317   WBC 1.1* 1.0*   RBC 4.16* 4.10*   HGB 10.7* 10.7*   HCT 33.1* 32.8*   MCV 79.6* 80.0   MCH 25.7 26.1   MCHC 32.3* 32.6*   RDW 16.8 17.2*    236     No results for input(s): LACTIC in the last 72 hours.  No results for input(s): INR, APTT, D-DIMER in the last 72 hours.  No results for input(s): HGBA1C, CHOL, TRIG, LDL, VLDL, HDL in the last 72 hours.  No results for input(s): PH, PCO2, PO2, HCO3, POCSATURATED, BE in the last 72 hours.    Recent Labs     01/23/23  0300 01/24/23 0317   * 132*   K 3.9 4.1   CHLORIDE 102 104   CO2 21* 21*   BUN 3.6* 4.1*   CREATININE 0.83 0.81   GLUCOSE 73* 64*   CALCIUM 8.4 8.4   MG 1.70 1.90   PHOS 3.3 3.2   ALBUMIN 1.8* 1.9*   GLOBULIN 5.1* 5.0*   ALKPHOS 180* 164*   ALT 10 11   AST 17 17   BILITOT 0.2 0.2     No results for input(s): BNP, CPK, TROPONINI in the last 72 hours.       Microbiology Results (last 7 days)       Procedure Component Value Units Date/Time    Blood Culture [[id number]]  (Normal) Collected: 01/18/23 1425    Order Status: Completed Specimen: Blood from Forearm, Right Updated: 01/23/23 1901     CULTURE, BLOOD ([facility]) No Growth at 5 days    Blood Culture [[id number]]  (Normal) Collected: 01/18/23 1402    Order Status: Completed Specimen: Blood from Arm, Right Updated: 01/23/23 1901     CULTURE, BLOOD  ([facility]) No Growth at 5 days    Respiratory Culture [[id number]]  (Abnormal)  (Susceptibility) Collected: 01/18/23 1608    Order Status: Completed Specimen: Sputum, Expectorated Updated: 01/22/23 1110     Respiratory Culture Moderate Yeast      Moderate Methicillin Sensitive Staphylococcus aureus     Comment: with normal respiratory shree        GRAM STAIN Quality 1+      Many Gram positive cocci      Many Gram Negative Rods      Many Gram Positive Rods      Few Yeast    Urine culture [[id number]] Collected: 01/18/23 1232    Order Status: Completed Specimen: Urine Updated: 01/20/23 0719     Urine Culture No Significant Growth    Chlamydia/GC, PCR [[id number]]  (Normal) Collected: 01/19/23 2235    Order Status: Completed Specimen: Urine Updated: 01/20/23 0215     Chlamydia trachomatis PCR Not Detected     N. gonorrhea PCR Not Detected    Narrative:      The Xpert CT/NG test, performed on the kenxuspert system is a qualitative in vitro real-time polymerase chain reaction (PCR) test for the automated detected and differentiation for genomic DNA from Chlamydia trachomatis (CT) and/or Neisseria gonorrhoeae (NG).    Cryptococcal antigen, blood [[id number]] Collected: 01/18/23 1916    Order Status: Completed Specimen: Blood, Venous Updated: 01/18/23 1937     CRYPTOCOCCAL ANTIGEN, SERUM ([facility]) Negative     CRYPTOCOCCAL ANTIGEN TITER ([facility]) --    Blood Culture (site 1) [[id number]]     Order Status: Canceled Specimen: Blood     Blood Culture (site 2) [[id number]]     Order Status: Canceled Specimen: Blood     Stool Culture [[id number]]     Order Status: Sent Specimen: Stool            Imaging     CT Abdomen Pelvis With Contrast   Final Result      1. Findings concerning for multifocal pneumonia in the lung bases.   2. No acute abnormality of the abdomen or pelvis.         Electronically signed by: Amairani Cruz   Date:    01/18/2023   Time:    14:34      X-Ray Chest PA And Lateral   ED Interpretation   Signs of pneumonia in the right  lower lobe.  No cardiomegaly or pneumothorax      Final Result      Bilateral lungs infiltrates.         Electronically signed by: Tom Quezada   Date:    01/18/2023   Time:    13:59        Assessment & Plan   PLAN:  HIV  Oral candidiasis   - HIV 1/2 Ag/Ab reactive   - Hepatitis panel negative, Cryptococcus negative  - Gonorrhea/Chlamydia negative   - Syphilis Ab reactive, RPR reactive. Treated at [facility] with single dose penicillin G for secondary syphilis on 12/19/21. Contacted Infectious disease and confirmed completed treatment for secondary syphilis on 12/2021. Initial ; repeat was 64  - Respiratory culture positive for moderate Yeast  - Continue IV fluconazole 200 mg qd for total of 14 days - started on 1/20/23, ending 2/04/23   - CD4 count 7.9; viral load of 340,000  - Will consider opportunistic infection prophylaxis   - Speech therapy consult placed for swallow study, recommends thin liquids and regular consistencies; consider EGD     Community Acquired PNA  Suspected PJP  Sepsis   - SIRS 3/4: leukopenia, tachycardia, fever with respiratory source   - Chest xray revealed patchy bilateral infiltrates with dense consolidation  in right middle lobe   - COVID/FLU PCR negative  - WBC 1.0 from 1.1 today   - Neutropenic precautions   - Respiratory culture positive for methicillin sensitive staph. Aureus   - Blood culture no growth at 96 hours   - Will discontinue Zosyn and Vanc, done on 1/22/23. Continue Bactrim for PJP infection    - PJP PCR pending, Fungitell positive      Hyponatremia  Hypophosphatemia   - Sodium decreased this AM  - Serum osmolality 272, urine osmolality wnl, urine Na <20  - Stool studies, Legionella,Giardia/Cryptosporidium pending    - Will continue to monitor and replete as necessary, keep K>4, Phos>3, Mg>2   - Restarted NS at 75cc/hr due to decreased PO intake      Normocytic anemia   - H/H stable this AM   - Ordered FOBT given hx of hematochezia; patient still has not had bowel  movement  - Continue to monitor H/H for further drop and need for intervention     Malnutrition   - Consulted Nutrition  - Continue neutropenic diet and encourage oral intake   - Continue chocolate boost TID, tolerating PO intake   - Increased to megace 400 mg qd to stimulate appetite per recommendation          Access: Peripheral   Antibiotics: Bactrim   Diet: Regular as tolerated    DVT Prophylaxis: Lovenox 30mg qd   GI Prophylaxis: None   Fluids: NS 75 ml/hr       Disposition: Continue Bactrim for PJP infection. Respiratory culture positive for MSSA. Continue infectious disease workup for newly diagnosed HIV. Will discharge patient once able to tolerate PO diet to f/u on outpt infectious disease, nutrition recommends supplemental PPN of clinimix + lipids.        Cecile Kim MD  [facility]

## 2023-01-24 NOTE — PLAN OF CARE
Problem: Skin Injury Risk Increased  Goal: Skin Health and Integrity  Outcome: Ongoing, Progressing     Problem: Neutropenia  Goal: Absence of Infection  Outcome: Ongoing, Progressing

## 2023-01-24 NOTE — PT/OT/SLP PROGRESS
[facility]  Speech Language Pathology - Swallowing Follow-up Note/Discharge         Chart reviewed.?Patient was previously seen for clinical swallowing evaluation and recommendations were for IDDSI 7 (regular) with IDDSI 0 (thin).      Patient was seen for training on swallowing strategies, and education/training for maximizing safety and diet tolerance.       Patient was seen alert, cooperative, and seated up in bed.     Pain:   0/10  Pain Location / Description: no pain reported         GOALS:     Long Term Goals:     Consume least restrictive diet safely    Provide individualized education to the patient and family regarding disorder and management     Short Term Goals:    Consume the current diet with no clinical s/s of aspiration using facilitative swallowing strategies with supervision . Goal met 1/24  Patient and family will be educated on the recommended aspiration precautions and swallowing strategies. Goal met 1/24            Impression: Patient presents with a functional oropharyngeal swallow. No overt s/sx of airway invasion appreciated during this follow up. If concern for silent aspiration recommend an objective assessment. The patients swallowing function appeared to be improved since the last session.          Recommendations:      IDDSI 7 (regular), IDDSI 0 (thin).   Medication presentation: Whole with liquid wash  Oral Care: Minimum of regular toothbrush use 2-3 times/day    Please offer tray set up with intermittent supervision   Adhere to these aspiration precautions and swallowing strategies (feed only when alert, upright 90 degrees, small bites/sips, slow rate, alternate solids/liquids, remain upright 30 minutes after meals)   Will sign off at this time. Please feel free to reconsult should new swallowing difficulties arise. Thank you          *If this is the last documented treatment note, then it will signify discharge from acute care prior to discharge from speech  services and will serve as the discharge summary.*          Education: Patient, RN (Kaykay),  was educated on the results and recommendations of this evaluation. All expressed understanding.           López Ernst M.S. CCC-SLP  [facility]

## 2023-01-24 NOTE — PT/OT/SLP PROGRESS
Physical Therapy Treatment    Patient Name:  Hanh Hassan   MRN:  [id number]    Recommendations:     Discharge Recommendations:  home health PT   Discharge Equipment Recommendations: walker, rolling   Barriers to discharge: Level of Skilled Assistance Needed and Endurance    Assessment:     Hanh Hassan is a 27 y.o. male admitted with a medical diagnosis of <principal problem not specified>.  He presents with the following impairments/functional limitations:  weakness, impaired endurance, impaired functional mobility, gait instability, impaired balance, decreased lower extremity function .    Rehab Prognosis: Good; patient would benefit from acute skilled PT services to address these deficits and reach maximum level of function.    Recent Surgery: * No surgery found *      Plan:     During this hospitalization, patient to be seen  (3-5xwk) to address the identified rehab impairments via gait training, therapeutic activities and progress toward the following goals:    Plan of Care Expires:  02/16/23    Subjective     Chief Complaint: weakness  Patient/Family Comments/goals: return home  Pain/Comfort:  Pain Rating 1: 0/10      Objective:     Communicated with nurse Mccracken prior to session.  Patient found supine with peripheral IV upon PT entry to room.     General Precautions: Standard, neutropenic, fall   Orthopedic Precautions:N/A   Braces:    Respiratory Status: Room air     Functional Mobility:  Bed Mobility:     Supine to Sit: modified independence  Transfers:     Sit to Stand:  stand by assistance with rolling walker  Gait: Pt. Ambulated very slowly 130 ft around the nurses station. Pt. Had slow paced gait, no loss of balance observed.  Pt. Wanted to return to his room, he declined to ambulate a second lap.    Therapeutic Activities and Exercises:  Transfer training sit to stand x 5 reps.     Patient left supine with all lines intact, call button in reach, nurse notified, and aunt in room present..    GOALS:    Multidisciplinary Problems       Physical Therapy Goals          Problem: Physical Therapy    Goal Priority Disciplines Outcome Goal Variances Interventions   Physical Therapy Goal     PT, PT/OT Ongoing, Progressing     Description: Goals to be met by: Discharge     Patient will increase functional independence with mobility by performing:    -. Supine to sit with Modified Saline-ONGOING  -. Sit to supine with Modified Saline-ONGOING  -. Sit to stand transfer with Supervision -ONGOING  -. Gait  x 260 feet with Stand-by Assistance using Rolling Walker. -ONGOING                         Time Tracking:     PT Received On: 01/23/23  PT Start Time: 1105     PT Stop Time: 1117  PT Total Time (min): 12 min     Billable Minutes: Gait Training 12    Treatment Type: Treatment  PT/PTA: PT     PTA Visit Number: 0     01/24/2023

## 2023-01-24 NOTE — MEDICAL/APP STUDENT
[facility] Medicine Wards Progress Note     Resident Team: [facility] Medicine List 1  Attending Physician: Evelyn Kc MD  Resident: ***  Intern: ***       Subjective:      Brief HPI:  Hanh Hassan is a 27 y.o. male with no significant medical history who presented to [facility] ED on 2023  for blood work following recent ED visit in December. In December, patient was seen in [facility] ED for sore throat, cough, and pain when swallowing solids and liquids. He was discharged and advised to get tested for HIV at the health unit, which he was unable to do so. During this time, he started experiencing weight loss, decreased appetite, nausea, vomiting, fatigue, and night sweats.  Within the last 2 weeks, patient reports fevers, rhinorrhea, and cough productive of white sputum.  Patient denies hemoptysis, chest pain, palpitations, abdominal pain, shortness of breath.  He denies constipation or diarrhea however reports occasional mucus and bright red blood in stools.  He also reports recent bilateral lower extremity weakness requiring assistance ambulating.  Lab significant for leukopenia WBC 1.3, normocytic anemia, hyponatremia, hypophosphatemia, elevated ALP, and positive HIV.  Chest x-ray revealed patchy bilateral lung infiltrates, with dense consolidations in right middle lobe.  In the ED, he was given 1.5 L of normal saline, Zosyn, and Vancomycin.  Internal Medicine was consulted for new HIV diagnosis and pneumonia.    Interval History: NAEON. AFVSS. Pt tolerating solids w/o n/v. Reports improved appetite. Ambulating with PT in the AMs. Doing well, no complaints at this time. Denies f/c, HA, CP, SOB, diarrhea/constipation.      Review of Systems:  ROS completed and negative except as indicated above.     Objective:     Last 24 Hour Vital Signs:  BP  Min: 102/61  Max: 113/70  Temp  Av.6 °F (36.4 °C)  Min: 97.3 °F (36.3 °C)  Max: 97.9 °F (36.6 °C)  Pulse  Av  Min: 67  Max: 75  Resp  Av  Min: 18  Max: 18  SpO2  Avg:  97.7 %  Min: 95 %  Max: 99 %  I/O last 3 completed shifts:  In: 5181.2 [P.O.:530; IV Piggyback:4651.2]  Out: 2000 [Urine:2000]    Physical Examination:  General: malnourished, in no acute distress, resting comfortably in bed  HENT: NC/AT, moist mucus membranes  Neck: full range of motion, no palpable lymphadenopathy  Respiratory: clear to auscultation bilaterally, respirations non-labored  Cardiovascular: regular rate and rhythm without murmurs, gallops or rubs  Gastrointestinal: soft, NTND   Musculoskeletal: no obvious deformities, full range of motion of all extremities  Integumentary: hypopigmented patches located on scalp  Extremities: no LE edema  Neurologic: no signs of peripheral neurological deficit, motor/sensory function intact    Laboratory:  Most Recent Data:  CBC:   Lab Results   Component Value Date    WBC 1.0 (LL) 01/24/2023    HGB 10.7 (L) 01/24/2023    HCT 32.8 (L) 01/24/2023     01/24/2023    MCV 80.0 01/24/2023    RDW 17.2 (H) 01/24/2023     WBC Differential:   Recent Labs   Lab 01/20/23  0323 01/21/23  0415 01/22/23  0258 01/23/23  0300 01/24/23  0317   WBC 1.5* 1.3* 1.2* 1.1* 1.0*   HGB 10.6* 10.9* 10.8* 10.7* 10.7*   HCT 31.8* 32.3* 33.7* 33.1* 32.8*    191 232 245 236   MCV 78.5* 79.0* 79.5* 79.6* 80.0     BMP:   Lab Results   Component Value Date     (L) 01/24/2023    K 4.1 01/24/2023    CO2 21 (L) 01/24/2023    BUN 4.1 (L) 01/24/2023    CREATININE 0.81 01/24/2023    CALCIUM 8.4 01/24/2023    MG 1.90 01/24/2023    PHOS 3.2 01/24/2023     LFTs:   Lab Results   Component Value Date    ALBUMIN 1.9 (L) 01/24/2023    BILITOT 0.2 01/24/2023    AST 17 01/24/2023    ALKPHOS 164 (H) 01/24/2023    ALT 11 01/24/2023     Coags: No results found for: INR, PROTIME, PTT  FLP:   Lab Results   Component Value Date    CHOL 56 01/19/2023    HDL 11 (L) 01/19/2023    TRIG 72 01/19/2023     DM:   Lab Results   Component Value Date    HGBA1C 5.5 01/18/2023    CREATININE 0.81 01/24/2023      Thyroid:   Lab Results   Component Value Date    TSH 1.435 01/18/2023     Anemia:   Lab Results   Component Value Date    IRON 7 (L) 01/18/2023    TIBC 125 (L) 01/18/2023    FERRITIN 2,325.58 (H) 01/18/2023     Cardiac:   Lab Results   Component Value Date    TROPONINI <0.010 01/18/2023    BNP 51.8 01/18/2023       Microbiology Data Reviewed: yes  Pertinent Findings:  Microbiology Results (last 7 days)       Procedure Component Value Units Date/Time    Blood Culture [[id number]]  (Normal) Collected: 01/18/23 1425    Order Status: Completed Specimen: Blood from Forearm, Right Updated: 01/23/23 1901     CULTURE, BLOOD ([facility]) No Growth at 5 days    Blood Culture [[id number]]  (Normal) Collected: 01/18/23 1402    Order Status: Completed Specimen: Blood from Arm, Right Updated: 01/23/23 1901     CULTURE, BLOOD ([facility]) No Growth at 5 days    Respiratory Culture [[id number]]  (Abnormal)  (Susceptibility) Collected: 01/18/23 1608    Order Status: Completed Specimen: Sputum, Expectorated Updated: 01/22/23 1110     Respiratory Culture Moderate Yeast      Moderate Methicillin Sensitive Staphylococcus aureus     Comment: with normal respiratory shree        GRAM STAIN Quality 1+      Many Gram positive cocci      Many Gram Negative Rods      Many Gram Positive Rods      Few Yeast    Urine culture [[id number]] Collected: 01/18/23 1232    Order Status: Completed Specimen: Urine Updated: 01/20/23 0719     Urine Culture No Significant Growth    Chlamydia/GC, PCR [[id number]]  (Normal) Collected: 01/19/23 2235    Order Status: Completed Specimen: Urine Updated: 01/20/23 0215     Chlamydia trachomatis PCR Not Detected     N. gonorrhea PCR Not Detected    Narrative:      The Xpert CT/NG test, performed on the GeneXpert system is a qualitative in vitro real-time polymerase chain reaction (PCR) test for the automated detected and differentiation for genomic DNA from Chlamydia trachomatis (CT) and/or Neisseria gonorrhoeae (NG).     Cryptococcal antigen, blood [[id number]] Collected: 01/18/23 1916    Order Status: Completed Specimen: Blood, Venous Updated: 01/18/23 1937     CRYPTOCOCCAL ANTIGEN, SERUM ([facility]) Negative     CRYPTOCOCCAL ANTIGEN TITER ([facility]) --    Blood Culture (site 1) [[id number]]     Order Status: Canceled Specimen: Blood     Blood Culture (site 2) [[id number]]     Order Status: Canceled Specimen: Blood     Stool Culture [[id number]]     Order Status: Sent Specimen: Stool              Other Results:  EKG (my interpretation): unchanged from previous tracings.    Radiology:  Imaging Results              CT Abdomen Pelvis With Contrast (Final result)  Result time 01/18/23 14:34:11      Final result by Amairani Cruz MD (01/18/23 14:34:11)                   Impression:      1. Findings concerning for multifocal pneumonia in the lung bases.  2. No acute abnormality of the abdomen or pelvis.      Electronically signed by: Amairani Cruz  Date:    01/18/2023  Time:    14:34               Narrative:    EXAMINATION:  CT ABDOMEN PELVIS WITH CONTRAST    CLINICAL HISTORY:  Flank pain, kidney stone suspected;    TECHNIQUE:  CT imaging was performed of the abdomen and pelvis after the administration of intravenous contrast. Dose length product is 157 mGycm. Automatic exposure control, adjustment of mA/kV or iterative reconstruction technique was used to limit radiation dose.    COMPARISON:  None    FINDINGS:  Liver: Normal.    Gallbladder and biliary tree: No calcified gallstones. No intra or extrahepatic biliary ductal dilation.    Pancreas: Normal.    Spleen: Normal.    Adrenals: Normal.    Kidneys and ureters: There is no obstructing urinary calculus.  There is no hydronephrosis.    Bladder: Normal.    Reproductive organs: No pelvic masses.    Stomach/bowel: No evidence of bowel obstruction. The appendix is not clearly identified.  No discernible bowel inflammation.    Lymph nodes: No pathologically enlarged lymph node  identified.    Peritoneum: No ascites or free air. No fluid collection.    Vessels: No abdominal aortic aneurysm.    Abdominal wall: Normal.    Lung bases: There are consolidative changes in the right lower and middle lobes with additional scattered bibasilar airspace opacities.    Bones: No acute osseous findings.                                       X-Ray Chest PA And Lateral (Final result)  Result time 01/18/23 13:59:35      Final result by Tom Quezada MD (01/18/23 13:59:35)                   Impression:      Bilateral lungs infiltrates.      Electronically signed by: Tom Quezada  Date:    01/18/2023  Time:    13:59               Narrative:    EXAMINATION:  XR CHEST PA AND LATERAL    CLINICAL HISTORY:  SV;    TECHNIQUE:  Two views    COMPARISON:  December 6, 2022.    FINDINGS:  Cardiopericardial silhouette is within normal limits.  There is dense consolidation which involves the right middle lung lobe.  There are additional patchy infiltrates which involve the right lower lung lobe and to a lesser degree the left lower lung lobe.  No pulmonary edema or pneumothorax.  No fluid accumulation within the pleural spaces.                        ED Interpretation by Delfino Mishra MD (01/18/23 13:56:26, [facility], Emergency Medicine)    Signs of pneumonia in the right lower lobe.  No cardiomegaly or pneumothorax                                    Current Medications:     Infusions:   sodium chloride 0.9% 75 mL/hr at 01/23/23 1045        Scheduled:   enoxaparin  30 mg Subcutaneous Daily    fluconazole (DIFLUCAN) IV (PEDS and ADULTS)  200 mg Intravenous Q24H    megestroL  400 mg Oral Daily    sodium phosphate IVPB  15 mmol Intravenous Once    trimethoprim-sulfamethoxasole (BACTRIM) IVPB (for non fluid restricted pts) (fixed ratio)  20 mg/kg/day Intravenous Q8H        PRN:  sodium chloride, acetaminophen, dextrose 10%, dextrose 10%, glucagon (human recombinant), glucose, glucose,  naloxone, ondansetron, sodium chloride 0.9%    Antibiotics and Day Number of Therapy:  Bactrim Day 5  Fluconazole Day 3    Lines and Day Number of Therapy:  PIV    Assessment & Plan:   HIV  Oral candidiasis   - HIV 1/2 Ag/Ab reactive   - CD4 Count 7.9; Viral Load 340,000  - Hepatitis panel negative; cryptococcus negative  - Gonorrhea/Chlamydia negative   - Syphilis Ab reactive, RPR reactive. Treated at [facility] with single dose penicillin G for secondary syphilis on 12/19/21. Contacted Infectious disease and confirmed completed treatment for secondary syphilis on 12/2021. Initial ; repeat was 64  - Toxoplasma pending   - Continue IV fluconazole 200 mg QD for total of 14 days  - Start azithromycin PPx for MAC d/t CD4 count <150  - CMV w/u?  - Speech therapy consult placed for swallow study, recommends thin liquids and regular consistencies; consider EGD     CAP  Suspected PJP  Sepsis   - SIRS 3/4: leukopenia, tachycardia, fever with respiratory source   - Chest XR revealed patchy bilateral infiltrates with dense consolidation in right middle lobe   - COVID/FLU PCR negative  - Fungitell positive, 185  - PJP PCR, legionella pending   - WBC 1.0 this AM from 1.1    - Neutropenic precautions   - Respiratory Cx positive for moderate yeast and MSSA  - BCx No Growth at 96 hrs  - Switch IV Bactrim for PJP infection to PO d/t continue hyponatremia  - Continue IVF with NS 75 mL/hr     Hyponatremia  - Sodium improved at 132 this AM from 131  - Serum osmolality 272, urine osmolality wnl, urine Na <20  - Stool studies, Legionella,Giardia/Cryptosporidium pending    - Will continue to monitor and replete as necessary     Normocytic anemia   - H/H stable at 10.7/32.8 this AM from 10.7/33.1  - FOBT pending given hx of hematochezia; patient still has not had BM  - Continue to monitor H/H for further drop and need for intervention     Malnutrition   - Consulted Nutrition  - Consult GI given pt's reported hx of hematochezia  -  Continue neutropenic diet and encourage oral intake   - Continue chocolate boost TID   - Continue megace 400 mg QD to stimulate appetite per recommendation      CODE STATUS: Full Code  Access: PIV  Antibiotics: fluconazole, bactrim  Diet: neutropenic  DVT Prophylaxis: lovenox  GI Prophylaxis: N/A  Fluids: NS 75 mL/hr      Disposition: D/c in 1-2 days pending rolling walker per PT recs and continued improvement in PO intake.    Phoenix Hwaung, MS4  [facility] Medical Student

## 2023-01-24 NOTE — PHYSICIAN QUERY
PT Name: Hanh Hassan  MR #: [id number]    DOCUMENTATION CLARIFICATION     CDS:  Valerie Butts RN, BSN  Contact Information:  [email or web address]     This form is a permanent document in the medical record.     Query Date: 2023    By submitting this query, we are merely seeking further clarification of documentation.. Please utilize your independent clinical judgment when addressing the question(s) below.    The medical record contains the following:   Indicators  Supporting Clinical Findings Location in Medical Record    Energy Intake Energy Intake: </= 50% of estimated energy requirement for >/= 5 days :  Inpatient Nutrition Assessment    Weight Loss Interpretation of Weight Loss: >7.5% in 3 months :  Inpatient Nutrition Assessment    Fat Loss Body Fat: pt with + severe muscle/fat wasting  Area of Body Fat Loss: orbital region  and upper arm region - triceps / biceps :  Inpatient Nutrition Assessment    Muscle Loss Muscle Mass Loss:  pt with + severe muscle/fat wasting  Area of Muscle Mass Loss: temple region - temporalis muscle, clavicle bone region - pectoralis major, deltoid, trapezius muscles, clavicle and acromion bone region - deltoid muscle, and scapular bone region - trapezius, supraspinus, infraspinus muscles :  Inpatient Nutrition Assessment    Reduced  Strength (by dynamometer)  Unable to obtain :  Inpatient Nutrition Assessment    Weight, BMI, Usual Body Weight Admit Weight: 50 kg (110 lb 3.7 oz)     BMI (Calculated): 17.7    Usual Body Weight (UBW), k.9 kg (pt reported UBW 65.9kg; + wt loss --unsure exact timeframe--? couple months)  Pt stated # :  Inpatient Nutrition Assessment    Registered Dietician Diagnosis Degree of Malnutrition: severe malnutrition    Malnutrition related to new Dx HIV  as evidenced by eating < 50% meals several weeks; 45# reported wt loss; + severe muscle/fat wasting; BMI 17. . (new) :  Inpatient Nutrition  Assessment    Acute or Chronic Illness HIV, oral candidiasis, PNA, suspected PJP, sepsis, hyponatremia, hypophosphatemia, anemia  1/19:  Inpatient Nutrition Assessment    Treatment Continue Neutropenic diet (encourage oral intake)  Will send chocolate boost plus tid--360kcal; 14 gm protein per serving  Suggest megace to stimulate appetite  MVI/fe  Biweekly wt  If pt remains with poor po intake--? Need supplemental PPN to help meet nutrient needs: Clinimix 4.25/5 @ 70ml/hr with lipids 20% 250 ml every other day to provide 821 calories, 71 gm protein.  1/19:  Inpatient Nutrition Assessment    Other  Malnutrition Affecting Factors:  decreased appetite, nausea, sore mouth, and vomiting 1/19:  Inpatient Nutrition Assessment     Academy of Nutrition and Dietetics (Academy) and the American Society for Parenteral and Enteral Nutrition (A.S.P.E.N.) Clinical Characteristics to support Malnutrition   Malnutrition in the Context of Acute Illness or Injury Malnutrition in the Context of Chronic Illness or Injury Malnutrition in the Context of Social or Environmental Circumstances   Malnutrition Level Moderate Severe Moderate Severe   Moderate   Severe   Energy Intake <75%                   >7 days <50%                 >5 days <75%           >1 month <75%                      >1 month   <75% for >3 months   <50% for >1 month   Weight Loss   1-2% in 1 week >2% in 1 week 5% in 1 month >5% in 1 month 5% in 1 month >5% in 1 month    5% in 1 month >5% in 1 month 7.5% in 3 months >7.5% in 3 months 7.5% in 3 months >7.5% in 3 months    7.5% in 3 months >7.5% in 3 months 10% in 6 months >10% in 6 months 10% in 6 months >10% in 6 months        20% in 1 year                    >20% in 1 year                                                                  20% in 1 year                            >20% in 1 year                                                  Subcutaneous Fat Loss Mild  Moderate  Mild  Severe    Mild   Severe   Muscle Loss  Mild  Moderate  Mild  Severe    Mild   Severe   Edema/Fluid Accumulation Mild Moderate to severe  Mild  Severe   Mild   Severe   Reduced  Strength         (based on standards supplied by  of dynamometer) N/A Measurably reduced N/A Measurably reduced N/A Measurably reduced     Criteria for mild malnutrition is defined as 1 characteristic outlined above within the established moderate or severe parameters.  A minimum of 2 out of the 6 characteristics noted above are recommended for a diagnosis of moderate or severe malnutrition.  Chronic illness/injury is a disease/condition lasting 3 months or longer.    The noted clinical guidelines are only system guidelines and do not replace the providers clinical judgment.    Provider, please specify Degree of Malnutrition and Identify Relationship to HIV by using above associated clinical findings.      [  ] Mild Malnutrition - 1 characteristic outlined above within the established moderate or severe parameters   [  x] Moderate Malnutrition - a minimum of 2 of the 6 moderate malnutrition characteristics noted above    [  ] Severe Malnutrition - a minimum of 2 of the 6 severe malnutrition characteristics noted above    [  ] Other Nutritional Diagnosis (please specify): _______   [  ] Malnutrition ruled out   [  ] Clinically Undetermined     Please select the association with HIV.  [  ] Wasting syndrome due to HIV   [  ] Malnutrition not HIV related     Please document in your progress notes daily for the duration of treatment until resolved and  include in your discharge summary.      References:    QUENTIN Mireles., & WARNER Greenwood. (2022, April). Assessment and management of anorexia and cachexia in palliative care. Retrieved May 23, 2022, from [email or web address]-[email or web address]-management-[email or web address]-[email or web address]-cachexia-[email or web address]-[email or web address]-care?[email or web address]_link     MARTHA Soto, PhD, RD, Miracle GOINS P., PhD, RN, ZANDRA Wynn MD, PhD, Hillary RIVERA A.,  MS, RD, [facility], CAL Salgado, MS, RD, The Academy Malnutrition Work Group, The A.S.P.E.N. Board of Directors. (2012). Consensus Statement: Academy of Nutrition and Dietetics and American Society for Parenteral and Enteral Nutrition: Characteristics Recommended for the Identification and Documentation of Adult Malnutrition (Undernutrition). Journal of Parenteral and Enteral Nutrition, 36(3), 275-283. doi:10.1177/7333326875416813     Form No. 98076

## 2023-01-25 LAB
ABS NEUT CALC (OHS): 1.11 X10(3)/MCL (ref 2.1–9.2)
ALBUMIN SERPL-MCNC: 2.1 G/DL (ref 3.5–5)
ALBUMIN/GLOB SERPL: 0.4 RATIO (ref 1.1–2)
ALP SERPL-CCNC: 160 UNIT/L (ref 40–150)
ALT SERPL-CCNC: 10 UNIT/L (ref 0–55)
ANISOCYTOSIS BLD QL SMEAR: ABNORMAL
AST SERPL-CCNC: 19 UNIT/L (ref 5–34)
BILIRUBIN DIRECT+TOT PNL SERPL-MCNC: 0.3 MG/DL
BUN SERPL-MCNC: 5.3 MG/DL (ref 8.9–20.6)
CALCIUM SERPL-MCNC: 8.8 MG/DL (ref 8.4–10.2)
CHLORIDE SERPL-SCNC: 104 MMOL/L (ref 98–107)
CO2 SERPL-SCNC: 21 MMOL/L (ref 22–29)
CREAT SERPL-MCNC: 0.79 MG/DL (ref 0.73–1.18)
ERYTHROCYTE [DISTWIDTH] IN BLOOD BY AUTOMATED COUNT: 17.5 % (ref 11.5–17)
GFR SERPLBLD CREATININE-BSD FMLA CKD-EPI: >60 MLS/MIN/1.73/M2
GLOBULIN SER-MCNC: 5.3 GM/DL (ref 2.4–3.5)
GLUCOSE SERPL-MCNC: 70 MG/DL (ref 74–100)
HCT VFR BLD AUTO: 34.6 % (ref 42–52)
HGB BLD-MCNC: 10.9 GM/DL (ref 14–18)
IMM GRANULOCYTES # BLD AUTO: 0 X10(3)/MCL (ref 0–0.04)
IMM GRANULOCYTES NFR BLD AUTO: 0 %
L PNEUMO AB SER QL: NEGATIVE
LYMPHOCYTES NFR BLD MANUAL: 0.15 X10(3)/MCL
LYMPHOCYTES NFR BLD MANUAL: 10 % (ref 13–40)
MAGNESIUM SERPL-MCNC: 1.9 MG/DL (ref 1.6–2.6)
MCH RBC QN AUTO: 25.8 PG
MCHC RBC AUTO-ENTMCNC: 31.5 MG/DL (ref 33–36)
MCV RBC AUTO: 81.8 FL (ref 80–94)
MONOCYTES NFR BLD MANUAL: 0.24 X10(3)/MCL (ref 0.1–1.3)
MONOCYTES NFR BLD MANUAL: 16 % (ref 2–11)
NEUTROPHILS NFR BLD MANUAL: 72 % (ref 47–80)
NEUTS BAND NFR BLD MANUAL: 2 % (ref 0–11)
NRBC BLD AUTO-RTO: 0 %
PHOSPHATE SERPL-MCNC: 3 MG/DL (ref 2.3–4.7)
PLATELET # BLD AUTO: 227 X10(3)/MCL (ref 130–400)
PLATELET # BLD EST: NORMAL 10*3/UL
PMV BLD AUTO: 11.5 FL (ref 7.4–10.4)
POIKILOCYTOSIS BLD QL SMEAR: ABNORMAL
POLYCHROMASIA BLD QL SMEAR: ABNORMAL
POTASSIUM SERPL-SCNC: 4.9 MMOL/L (ref 3.5–5.1)
PROT SERPL-MCNC: 7.4 GM/DL (ref 6.4–8.3)
RBC # BLD AUTO: 4.23 X10(6)/MCL (ref 4.7–6.1)
RBC MORPH BLD: ABNORMAL
SCHISTOCYTE (OLG): ABNORMAL
SODIUM SERPL-SCNC: 133 MMOL/L (ref 136–145)
TEAR DROP CELL (OLG): ABNORMAL
WBC # SPEC AUTO: 1.5 X10(3)/MCL (ref 4.5–11.5)

## 2023-01-25 PROCEDURE — 84100 ASSAY OF PHOSPHORUS: CPT

## 2023-01-25 PROCEDURE — 36415 COLL VENOUS BLD VENIPUNCTURE: CPT

## 2023-01-25 PROCEDURE — 25000003 PHARM REV CODE 250: Performed by: INTERNAL MEDICINE

## 2023-01-25 PROCEDURE — 94761 N-INVAS EAR/PLS OXIMETRY MLT: CPT

## 2023-01-25 PROCEDURE — 63600175 PHARM REV CODE 636 W HCPCS: Performed by: STUDENT IN AN ORGANIZED HEALTH CARE EDUCATION/TRAINING PROGRAM

## 2023-01-25 PROCEDURE — 63600175 PHARM REV CODE 636 W HCPCS

## 2023-01-25 PROCEDURE — 27000207 HC ISOLATION

## 2023-01-25 PROCEDURE — 80053 COMPREHEN METABOLIC PANEL: CPT

## 2023-01-25 PROCEDURE — 97116 GAIT TRAINING THERAPY: CPT

## 2023-01-25 PROCEDURE — 21400001 HC TELEMETRY ROOM

## 2023-01-25 PROCEDURE — S0179 MEGESTROL 20 MG: HCPCS | Performed by: INTERNAL MEDICINE

## 2023-01-25 PROCEDURE — 85027 COMPLETE CBC AUTOMATED: CPT

## 2023-01-25 PROCEDURE — 83735 ASSAY OF MAGNESIUM: CPT

## 2023-01-25 RX ORDER — MAGNESIUM SULFATE 1 G/100ML
1 INJECTION INTRAVENOUS ONCE
Status: COMPLETED | OUTPATIENT
Start: 2023-01-25 | End: 2023-01-25

## 2023-01-25 RX ORDER — ONDANSETRON 2 MG/ML
4 INJECTION INTRAMUSCULAR; INTRAVENOUS EVERY 8 HOURS PRN
Status: DISCONTINUED | OUTPATIENT
Start: 2023-01-25 | End: 2023-01-26 | Stop reason: HOSPADM

## 2023-01-25 RX ADMIN — SULFAMETHOXAZOLE AND TRIMETHOPRIM 2 TABLET: 800; 160 TABLET ORAL at 09:01

## 2023-01-25 RX ADMIN — ENOXAPARIN SODIUM 30 MG: 40 INJECTION SUBCUTANEOUS at 04:01

## 2023-01-25 RX ADMIN — SODIUM CHLORIDE: 9 INJECTION, SOLUTION INTRAVENOUS at 02:01

## 2023-01-25 RX ADMIN — ONDANSETRON 4 MG: 2 INJECTION INTRAMUSCULAR; INTRAVENOUS at 09:01

## 2023-01-25 RX ADMIN — MEGESTROL ACETATE 400 MG: 40 SUSPENSION ORAL at 09:01

## 2023-01-25 RX ADMIN — FLUCONAZOLE 200 MG: 2 INJECTION, SOLUTION INTRAVENOUS at 09:01

## 2023-01-25 RX ADMIN — SULFAMETHOXAZOLE AND TRIMETHOPRIM 2 TABLET: 800; 160 TABLET ORAL at 04:01

## 2023-01-25 RX ADMIN — MAGNESIUM SULFATE IN DEXTROSE 1 G: 10 INJECTION, SOLUTION INTRAVENOUS at 01:01

## 2023-01-25 NOTE — PROGRESS NOTES
"   01/25/23 4942   Missed Time Reason   Missed Treatment Reason Patient unwilling to participate     Pt refused first attempt at 12:57 d/t eating lunch, OT agreed to return but at second attempt an hour later (14:04), pt still eating and stated "I need to finish eating first".  OT educated pt on importance of getting OOB and exercising to return to mod I, and that this would be last opportunity to participate today; pt reiterated he wanted to finish eating.  OT to f/u as schedule allows.  "

## 2023-01-25 NOTE — PROGRESS NOTES
New consult for [facility] & [facility]. Pt has no preference, referral submitted to [facility]. Will follow.

## 2023-01-25 NOTE — PLAN OF CARE
[facility] denied acceptance, out of network. Referral submitted to [facility], insurance accepted. Will deliver rolling walker today.   Multiple referrals submitted via [facility] for , acceptance pending. Will follow.

## 2023-01-25 NOTE — MEDICAL/APP STUDENT
[facility] Medicine Wards Progress Note     Resident Team: [facility] Medicine List 1  Attending Physician: Evelyn Kc MD  Resident: ***  Intern: ***       Subjective:      Brief HPI:  Hanh Hassan is a 27 y.o. male with no significant medical history who presented to [facility] ED on 2023  for blood work following recent ED visit in December. In December, patient was seen in [facility] ED for sore throat, cough, and pain when swallowing solids and liquids. He was discharged and advised to get tested for HIV at the health unit, which he was unable to do so. During this time, he started experiencing weight loss, decreased appetite, nausea, vomiting, fatigue, and night sweats.  Within the last 2 weeks, patient reports fevers, rhinorrhea, and cough productive of white sputum.  Patient denies hemoptysis, chest pain, palpitations, abdominal pain, shortness of breath.  He denies constipation or diarrhea however reports occasional mucus and bright red blood in stools.  He also reports recent bilateral lower extremity weakness requiring assistance ambulating.  Lab significant for leukopenia WBC 1.3, normocytic anemia, hyponatremia, hypophosphatemia, elevated ALP, and positive HIV.  Chest x-ray revealed patchy bilateral lung infiltrates, with dense consolidations in right middle lobe.  In the ED, he was given 1.5 L of normal saline, Zosyn, and Vancomycin.  Internal Medicine was consulted for new HIV diagnosis and pneumonia.    Interval History: NAEON. AFVSS. Pt tolerating PO intake w/o n/v. Ambulated with PT around nursing station w/rolling walker. Denies, f/c, HA, CP, SOB, diarrhea/constipation.    Review of Systems:  ROS completed and negative except as indicated above.     Objective:     Last 24 Hour Vital Signs:  BP  Min: 96/63  Max: 113/71  Temp  Av.6 °F (36.4 °C)  Min: 97.4 °F (36.3 °C)  Max: 97.9 °F (36.6 °C)  Pulse  Av  Min: 63  Max: 79  Resp  Av  Min: 16  Max: 18  SpO2  Av.1 %  Min: 97 %  Max: 100 %  I/O last  3 completed shifts:  In: 1931 [P.O.:410; I.V.:900; IV Piggyback:621]  Out: 200 [Urine:200]    Physical Examination:  General: malnourished, in no acute distress  HENT: NC/AT, moist mucus membranes  Respiratory: clear to auscultation bilaterally, respirations non-labored  Cardiovascular: regular rate and rhythm without murmurs, gallops or rubs  Gastrointestinal: soft, NTND  Musculoskeletal: no obvious deformities  Integumentary: hypopigmented patches located on scalp  Extremities: no LE edema  Neurologic: no signs of peripheral neurological deficit, motor/sensory function intact    Laboratory:  Most Recent Data:  CBC:   Lab Results   Component Value Date    WBC 1.0 (LL) 01/24/2023    HGB 10.7 (L) 01/24/2023    HCT 32.8 (L) 01/24/2023     01/24/2023    MCV 80.0 01/24/2023    RDW 17.2 (H) 01/24/2023     WBC Differential:   Recent Labs   Lab 01/20/23  0323 01/21/23  0415 01/22/23  0258 01/23/23  0300 01/24/23  0317   WBC 1.5* 1.3* 1.2* 1.1* 1.0*   HGB 10.6* 10.9* 10.8* 10.7* 10.7*   HCT 31.8* 32.3* 33.7* 33.1* 32.8*    191 232 245 236   MCV 78.5* 79.0* 79.5* 79.6* 80.0     BMP:   Lab Results   Component Value Date     (L) 01/25/2023    K 4.9 01/25/2023    CO2 21 (L) 01/25/2023    BUN 5.3 (L) 01/25/2023    CREATININE 0.79 01/25/2023    CALCIUM 8.8 01/25/2023    MG 1.90 01/25/2023    PHOS 3.0 01/25/2023     LFTs:   Lab Results   Component Value Date    ALBUMIN 2.1 (L) 01/25/2023    BILITOT 0.3 01/25/2023    AST 19 01/25/2023    ALKPHOS 160 (H) 01/25/2023    ALT 10 01/25/2023     Coags: No results found for: INR, PROTIME, PTT  FLP:   Lab Results   Component Value Date    CHOL 56 01/19/2023    HDL 11 (L) 01/19/2023    TRIG 72 01/19/2023     DM:   Lab Results   Component Value Date    HGBA1C 5.5 01/18/2023    CREATININE 0.79 01/25/2023     Thyroid:   Lab Results   Component Value Date    TSH 1.435 01/18/2023     Anemia:   Lab Results   Component Value Date    IRON 7 (L) 01/18/2023    TIBC 125 (L)  01/18/2023    FERRITIN 2,325.58 (H) 01/18/2023     Cardiac:   Lab Results   Component Value Date    TROPONINI <0.010 01/18/2023    BNP 51.8 01/18/2023       Microbiology Data Reviewed: yes  Pertinent Findings:  Microbiology Results (last 7 days)       Procedure Component Value Units Date/Time    Blood Culture [[id number]]  (Normal) Collected: 01/18/23 1425    Order Status: Completed Specimen: Blood from Forearm, Right Updated: 01/23/23 1901     CULTURE, BLOOD ([facility]) No Growth at 5 days    Blood Culture [[id number]]  (Normal) Collected: 01/18/23 1402    Order Status: Completed Specimen: Blood from Arm, Right Updated: 01/23/23 1901     CULTURE, BLOOD ([facility]) No Growth at 5 days    Respiratory Culture [[id number]]  (Abnormal)  (Susceptibility) Collected: 01/18/23 1608    Order Status: Completed Specimen: Sputum, Expectorated Updated: 01/22/23 1110     Respiratory Culture Moderate Yeast      Moderate Methicillin Sensitive Staphylococcus aureus     Comment: with normal respiratory shree        GRAM STAIN Quality 1+      Many Gram positive cocci      Many Gram Negative Rods      Many Gram Positive Rods      Few Yeast    Urine culture [[id number]] Collected: 01/18/23 1232    Order Status: Completed Specimen: Urine Updated: 01/20/23 0719     Urine Culture No Significant Growth    Chlamydia/GC, PCR [[id number]]  (Normal) Collected: 01/19/23 2235    Order Status: Completed Specimen: Urine Updated: 01/20/23 0215     Chlamydia trachomatis PCR Not Detected     N. gonorrhea PCR Not Detected    Narrative:      The Xpert CT/NG test, performed on the GeneXpert system is a qualitative in vitro real-time polymerase chain reaction (PCR) test for the automated detected and differentiation for genomic DNA from Chlamydia trachomatis (CT) and/or Neisseria gonorrhoeae (NG).    Cryptococcal antigen, blood [[id number]] Collected: 01/18/23 1916    Order Status: Completed Specimen: Blood, Venous Updated: 01/18/23 1937     CRYPTOCOCCAL ANTIGEN, SERUM  ([facility]) Negative     CRYPTOCOCCAL ANTIGEN TITER ([facility]) --    Blood Culture (site 1) [[id number]]     Order Status: Canceled Specimen: Blood     Blood Culture (site 2) [[id number]]     Order Status: Canceled Specimen: Blood     Stool Culture [[id number]]     Order Status: Sent Specimen: Stool               Other Results:  EKG (my interpretation): unchanged from previous tracings.    Radiology:  Imaging Results              CT Abdomen Pelvis With Contrast (Final result)  Result time 01/18/23 14:34:11      Final result by Amairani Cruz MD (01/18/23 14:34:11)                   Impression:      1. Findings concerning for multifocal pneumonia in the lung bases.  2. No acute abnormality of the abdomen or pelvis.      Electronically signed by: Amairani Cruz  Date:    01/18/2023  Time:    14:34               Narrative:    EXAMINATION:  CT ABDOMEN PELVIS WITH CONTRAST    CLINICAL HISTORY:  Flank pain, kidney stone suspected;    TECHNIQUE:  CT imaging was performed of the abdomen and pelvis after the administration of intravenous contrast. Dose length product is 157 mGycm. Automatic exposure control, adjustment of mA/kV or iterative reconstruction technique was used to limit radiation dose.    COMPARISON:  None    FINDINGS:  Liver: Normal.    Gallbladder and biliary tree: No calcified gallstones. No intra or extrahepatic biliary ductal dilation.    Pancreas: Normal.    Spleen: Normal.    Adrenals: Normal.    Kidneys and ureters: There is no obstructing urinary calculus.  There is no hydronephrosis.    Bladder: Normal.    Reproductive organs: No pelvic masses.    Stomach/bowel: No evidence of bowel obstruction. The appendix is not clearly identified.  No discernible bowel inflammation.    Lymph nodes: No pathologically enlarged lymph node identified.    Peritoneum: No ascites or free air. No fluid collection.    Vessels: No abdominal aortic aneurysm.    Abdominal wall: Normal.    Lung bases: There are consolidative changes  in the right lower and middle lobes with additional scattered bibasilar airspace opacities.    Bones: No acute osseous findings.                                       X-Ray Chest PA And Lateral (Final result)  Result time 01/18/23 13:59:35      Final result by Tom Quezada MD (01/18/23 13:59:35)                   Impression:      Bilateral lungs infiltrates.      Electronically signed by: Tom Quezada  Date:    01/18/2023  Time:    13:59               Narrative:    EXAMINATION:  XR CHEST PA AND LATERAL    CLINICAL HISTORY:  SV;    TECHNIQUE:  Two views    COMPARISON:  December 6, 2022.    FINDINGS:  Cardiopericardial silhouette is within normal limits.  There is dense consolidation which involves the right middle lung lobe.  There are additional patchy infiltrates which involve the right lower lung lobe and to a lesser degree the left lower lung lobe.  No pulmonary edema or pneumothorax.  No fluid accumulation within the pleural spaces.                        ED Interpretation by Delfino Mishra MD (01/18/23 13:56:26, [facility], Emergency Medicine)    Signs of pneumonia in the right lower lobe.  No cardiomegaly or pneumothorax                                    Current Medications:     Infusions:   sodium chloride 0.9% 75 mL/hr at 01/23/23 1045        Scheduled:   azithromycin  1,200 mg Oral Weekly    enoxaparin  30 mg Subcutaneous Daily    fluconazole (DIFLUCAN) IV (PEDS and ADULTS)  200 mg Intravenous Q24H    megestroL  400 mg Oral Daily    sodium phosphate IVPB  15 mmol Intravenous Once    sulfamethoxazole-trimethoprim 800-160mg  2 tablet Oral TID        PRN:  sodium chloride, acetaminophen, dextrose 10%, dextrose 10%, glucagon (human recombinant), glucose, glucose, naloxone, ondansetron, sodium chloride 0.9%    Antibiotics and Day Number of Therapy:  Bactrim Day 6  Fluconazole Day 4  Azithromycin Day 2    Lines and Day Number of Therapy:  PIV    Assessment & Plan:     HIV  Oral  candidiasis   - HIV 1/2 Ag/Ab reactive   - CD4 Count 7.9; Viral Load 340,000  - Hepatitis panel, cryptococcus, toxoplasma negative  - Gonorrhea/Chlamydia negative   - Syphilis Ab reactive, RPR reactive. Treated at [facility] with single dose penicillin G for secondary syphilis on 12/19/21. Contacted Infectious disease and confirmed completed treatment for secondary syphilis on 12/2021. Initial ; repeat was 64  - Continue IV fluconazole 200 mg QD for total of 14 days  - Continue PO azithromycin 1200 mg weekly for MAC Ppx d/t CD4 count <150  - CMV w/u?  - Speech therapy consult placed for swallow study, recommends thin liquids and regular consistencies; consider EGD     CAP  Suspected PJP  Sepsis   - SIRS 3/4: leukopenia, tachycardia, fever with respiratory source   - Chest XR revealed patchy bilateral infiltrates with dense consolidation in right middle lobe   - COVID/FLU PCR negative  - Fungitell positive, 185  - PJP PCR, legionella pending   - WBC 1.5 this AM from 1.0    - Neutropenic precautions   - Respiratory Cx positive for moderate yeast and MSSA  - BCx no growth at 5d  - Continue PO Bactrim 800 160 mg 2 tabs TID Bactrim for presumed PJP infection to PO d/t continued hyponatremia  - Continue IVF with NS 75 mL/hr     Hyponatremia  - Sodium improved at 133 this AM from 132  - Serum osmolality 272, urine osmolality wnl, urine Na <20  - Stool studies, Legionella,Giardia/Cryptosporidium pending    - Will continue to monitor and replete as necessary     Normocytic anemia   - H/H stable at 10.9/34.6 this AM from 10.7/32.8   - FOBT pending given hx of hematochezia; patient still has not had BM  - Continue to monitor H/H for further drop and need for intervention     Malnutrition   - Consulted Nutrition  - Consult GI given pt's reported hx of hematochezia  - Continue neutropenic diet and encourage oral intake   - Continue chocolate boost TID   - Continue megace 400 mg QD to stimulate appetite per  recommendation      CODE STATUS: Full Code  Access: PIV  Antibiotics: azithromycin, fluconazole, bactrim  Diet: neutropenic  DVT Prophylaxis: lovenox  GI Prophylaxis: N/A  Fluids: NS 75 mL/hr      Disposition: Possible d/c tomorrow with rolling walker per PT recs.    Phoenix Hwaung, MS4  [facility] Medical Student

## 2023-01-25 NOTE — PLAN OF CARE
Problem: Neutropenia  Goal: Absence of Infection  Outcome: Ongoing, Progressing  Intervention: Prevent Infection and Maximize Resistance  Flowsheets (Taken 1/25/2023 4568)  Infection Prevention:   equipment surfaces disinfected   hand hygiene promoted   single patient room provided   rest/sleep promoted   personal protective equipment utilized  Oral Care:   oral rinse provided   teeth brushed

## 2023-01-25 NOTE — PT/OT/SLP PROGRESS
Physical Therapy Treatment    Patient Name:  Hanh Hassan   MRN:  [id number]    Recommendations:     Discharge Recommendations:  home health PT   Discharge Equipment Recommendations: walker, rolling   Barriers to discharge: Level of Skilled Assistance Needed    Assessment:     Hanh Hassan is a 27 y.o. male admitted with a medical diagnosis of <principal problem not specified>.  He presents with the following impairments/functional limitations:  weakness, gait instability, impaired functional mobility .    Rehab Prognosis: Good; patient would benefit from acute skilled PT services to address these deficits and reach maximum level of function.    Recent Surgery: * No surgery found *      Plan:     During this hospitalization, patient to be seen  (3-5xwk) to address the identified rehab impairments via gait training, therapeutic activities and progress toward the following goals:    Plan of Care Expires:  02/16/23    Subjective     Chief Complaint: none  Patient/Family Comments/goals: return home  Pain/Comfort:         Objective:     Communicated with nurse Odom prior to session.  Patient found supine with peripheral IV upon PT entry to room.     General Precautions: Standard, neutropenic, fall   Orthopedic Precautions:N/A   Braces:    Respiratory Status: Room air     Functional Mobility:  Bed Mobility:     Supine to Sit: supervision  Transfers:     Sit to Stand:  supervision with rolling walker  Gait: Pt. Ambulated with supervision 260 ft for the first time, 2 laps. Pt. Improved posture and balance.     Patient left supine with all lines intact, call button in reach, and nurse notified.    GOALS:   Multidisciplinary Problems       Physical Therapy Goals          Problem: Physical Therapy    Goal Priority Disciplines Outcome Goal Variances Interventions   Physical Therapy Goal     PT, PT/OT Ongoing, Progressing     Description: Goals to be met by: Discharge     Patient will increase functional independence with  mobility by performing:    -. Supine to sit with Modified [place]-ONGOING  -. Sit to supine with Modified [place]-ONGOING  -. Sit to stand transfer with Supervision -ONGOING  -. Gait  x 260 feet with Stand-by Assistance using Rolling Walker. -ONGOING                         Time Tracking:     PT Received On: 01/25/23  PT Start Time: 1118     PT Stop Time: 1128  PT Total Time (min): 10 min     Billable Minutes: Gait Training 10    Treatment Type: Treatment  PT/PTA: PT     PTA Visit Number: 0     01/25/2023

## 2023-01-25 NOTE — PROGRESS NOTES
[facility]  Internal Medicine  Progress Note      Patient Name: Hanh Hassan  : 1995  MRN: [id number]  Patient Class: IP- Inpatient   Admission Date: 2023   Length of Stay: 7  Admitting Service: Hospital Medicine  Attending Physician: Evelyn Kc MD   Senior Resident: Pietro Recinos MD   Intern: Cecile Kim MD   PCP: Primary Doctor No  Source of history: Patient, patient's family, and EMR.   Code status: Full     Chief Complaint   Fatigue and Weight Loss (Pt reports generalized weakness, fatigue, and weight loss over the past month. )      History of Present Illness   Hanh Hassan is a 27 y.o. male with no significant medical history who presented to [facility] ED on 2023  for blood work following recent ED visit in December.  In December, patient was seen in [facility] ED for sore throat, cough, and pain when swallowing solids and liquids. He was discharged and advised to get tested for HIV at the health unit, which he was unable to do so. During this time, he started experiencing weight loss, decreased appetite, nausea, vomiting, fatigue, and night sweats.  Within the last 2 weeks, patient reports fevers, rhinorrhea, and cough productive of white sputum.  Patient denies hemoptysis, chest pain, palpitations, abdominal pain, shortness of breath.  He denies constipation or diarrhea however reports occasional mucus and bright red blood in stools.  He also reports recent bilateral lower extremity weakness requiring assistance ambulating.      ED Course: Lab significant for leukopenia WBC 1.3, normocytic anemia, hyponatremia, hypophosphatemia, elevated ALP, and positive HIV.  Chest x-ray revealed patchy bilateral lung infiltrates, with dense consolidations in right middle lobe.  In the ED, he was given 1.5 L of normal saline, Zosyn, and Vancomycin.  Internal Medicine was consulted for new HIV diagnosis and pneumonia.    Interval History    NAEON. VSS. AF. Though pt is increasingly  tolerating PO intake with each day and feels like he is improving. Currently underweight by 32lbs. Mother at bedside encouraging PO intake. Denies fever, chills, fatigue, weakness, CP, racing heart, SOB, trouble breathing, diarrhea, vomiting, and constipation. No complaints at this time. WBC uptrending to 1.5 and H/H uptrending to 10.9/34.6 on CBC.  Na slightly uptrended to 133 and CO2 21. Continues to work well with PT, CM submitted referrals for [facility] to facilitate PT rec for HH PT w/ rolling walker.   ROS   Pertinent positive and negative as mentioned in HPI     Past Medical History   History reviewed. No pertinent past medical history.    Past Surgical History   History reviewed. No pertinent surgical history.    Social History     Social History     Tobacco Use    Smoking status: Never    Smokeless tobacco: Never   Substance Use Topics    Alcohol use: Not on file        Family History   Reviewed and noncontributory    Allergies   Latex and Latex, natural rubber    Home Medications     Prior to Admission medications    Medication Sig Start Date End Date Taking? Authorizing Provider   aluminum & magnesium hydroxide-simethicone (MYLANTA MAX STRENGTH) 400-400-40 mg/5 mL suspension Take 10 mLs by mouth every 6 (six) hours as needed for Indigestion.   Yes Historical Provider        Inpatient Medications   Scheduled Meds   azithromycin  1,200 mg Oral Weekly    enoxaparin  30 mg Subcutaneous Daily    fluconazole (DIFLUCAN) IV (PEDS and ADULTS)  200 mg Intravenous Q24H    megestroL  400 mg Oral Daily    sodium phosphate IVPB  15 mmol Intravenous Once    sulfamethoxazole-trimethoprim 800-160mg  2 tablet Oral TID     Continuous Infusions   sodium chloride 0.9% 75 mL/hr at 01/23/23 1045     PRN Meds  sodium chloride, acetaminophen, dextrose 10%, dextrose 10%, glucagon (human recombinant), glucose, glucose, naloxone, ondansetron, sodium chloride 0.9%    Physical Exam   Vital Signs  Temp:  [97.6 °F (36.4  °C)-97.9 °F (36.6 °C)]   Pulse:  [63-79]   Resp:  [16-18]   BP: ()/(63-73)   SpO2:  [97 %-100 %]       General: Appears comfortable  HEENT: NC/AT  Neck:  No JVD  Chest: CTABL  CVS: Regular rhythm. Normal S1/S2.  Abdomen: nondistended, normoactive BS, soft and non-tender.  MSK: No obvious deformity or joint swelling, spontaneous movement of extremities   Skin: Warm and dry  Neuro: AAOx3, no focal neurological deficit  Psych: Cooperative    Labs     Recent Labs     01/23/23  0300 01/24/23  0317   WBC 1.1* 1.0*   RBC 4.16* 4.10*   HGB 10.7* 10.7*   HCT 33.1* 32.8*   MCV 79.6* 80.0   MCH 25.7 26.1   MCHC 32.3* 32.6*   RDW 16.8 17.2*    236     No results for input(s): LACTIC in the last 72 hours.  No results for input(s): INR, APTT, D-DIMER in the last 72 hours.  No results for input(s): HGBA1C, CHOL, TRIG, LDL, VLDL, HDL in the last 72 hours.  No results for input(s): PH, PCO2, PO2, HCO3, POCSATURATED, BE in the last 72 hours.    Recent Labs     01/24/23  0317 01/25/23  0352   * 133*   K 4.1 4.9   CHLORIDE 104 104   CO2 21* 21*   BUN 4.1* 5.3*   CREATININE 0.81 0.79   GLUCOSE 64* 70*   CALCIUM 8.4 8.8   MG 1.90 1.90   PHOS 3.2 3.0   ALBUMIN 1.9* 2.1*   GLOBULIN 5.0* 5.3*   ALKPHOS 164* 160*   ALT 11 10   AST 17 19   BILITOT 0.2 0.3     No results for input(s): BNP, CPK, TROPONINI in the last 72 hours.       Microbiology Results (last 7 days)       Procedure Component Value Units Date/Time    Blood Culture [[id number]]  (Normal) Collected: 01/18/23 1425    Order Status: Completed Specimen: Blood from Forearm, Right Updated: 01/23/23 1901     CULTURE, BLOOD ([facility]) No Growth at 5 days    Blood Culture [[id number]]  (Normal) Collected: 01/18/23 1402    Order Status: Completed Specimen: Blood from Arm, Right Updated: 01/23/23 1901     CULTURE, BLOOD ([facility]) No Growth at 5 days    Respiratory Culture [[id number]]  (Abnormal)  (Susceptibility) Collected: 01/18/23 1608    Order Status: Completed Specimen: Sputum,  Expectorated Updated: 01/22/23 1110     Respiratory Culture Moderate Yeast      Moderate Methicillin Sensitive Staphylococcus aureus     Comment: with normal respiratory shree        GRAM STAIN Quality 1+      Many Gram positive cocci      Many Gram Negative Rods      Many Gram Positive Rods      Few Yeast    Urine culture [[id number]] Collected: 01/18/23 1232    Order Status: Completed Specimen: Urine Updated: 01/20/23 0719     Urine Culture No Significant Growth    Chlamydia/GC, PCR [[id number]]  (Normal) Collected: 01/19/23 2235    Order Status: Completed Specimen: Urine Updated: 01/20/23 0215     Chlamydia trachomatis PCR Not Detected     N. gonorrhea PCR Not Detected    Narrative:      The Xpert CT/NG test, performed on the [employer] system is a qualitative in vitro real-time polymerase chain reaction (PCR) test for the automated detected and differentiation for genomic DNA from Chlamydia trachomatis (CT) and/or Neisseria gonorrhoeae (NG).    Cryptococcal antigen, blood [[id number]] Collected: 01/18/23 1916    Order Status: Completed Specimen: Blood, Venous Updated: 01/18/23 1937     CRYPTOCOCCAL ANTIGEN, SERUM ([facility]) Negative     CRYPTOCOCCAL ANTIGEN TITER ([facility]) --    Blood Culture (site 1) [[id number]]     Order Status: Canceled Specimen: Blood     Blood Culture (site 2) [[id number]]     Order Status: Canceled Specimen: Blood     Stool Culture [[id number]]     Order Status: Sent Specimen: Stool            Imaging     CT Abdomen Pelvis With Contrast   Final Result      1. Findings concerning for multifocal pneumonia in the lung bases.   2. No acute abnormality of the abdomen or pelvis.         Electronically signed by: Amairani Cruz   Date:    01/18/2023   Time:    14:34      X-Ray Chest PA And Lateral   ED Interpretation   Signs of pneumonia in the right lower lobe.  No cardiomegaly or pneumothorax      Final Result      Bilateral lungs infiltrates.         Electronically signed by: Tom Quezada    Date:    01/18/2023   Time:    13:59        Assessment & Plan   PLAN:  HIV  Oral candidiasis   - HIV 1/2 Ag/Ab reactive   - Hepatitis panel negative, Cryptococcus negative  - Gonorrhea/Chlamydia negative   - Syphilis Ab reactive, RPR reactive. Treated at [facility] with single dose penicillin G for secondary syphilis on 12/19/21. Contacted Infectious disease and confirmed completed treatment for secondary syphilis on 12/2021. Initial ; repeat was 64  - Respiratory culture positive for moderate Yeast  - Continue IV fluconazole 200 mg qd for total of 14 days - started on 1/20/23, ending 2/04/23   - CD4 count 7.9; viral load of 340,000  - Will consider opportunistic infection prophylaxis   - Speech therapy consult placed for swallow study, recommends thin liquids and regular consistencies; consider EGD     Community Acquired PNA  Suspected PJP  Sepsis   - SIRS 3/4: leukopenia, tachycardia, fever with respiratory source   - Chest xray revealed patchy bilateral infiltrates with dense consolidation  in right middle lobe   - COVID/FLU PCR negative  - WBC 1.5 from 1.0 today   - Neutropenic precautions   - Respiratory culture positive for methicillin sensitive staph. Aureus and moderate yeast  - Blood culture no growth at 5 days  - Will discontinue Zosyn and Vanc, done on 1/22/23. Continue Bactrim for PJP infection, transition to PO 800ds 2 tablets TID and Azithromycin 1,200mg PO MAC prophylaxis   - PJP PCR pending, Fungitell positive   -1, 3 beta-D-glucan positive   - Stool exam, occult blood also pending     Hyponatremia  Hypophosphatemia   - Sodium decreased this AM  - Serum osmolality 272, urine osmolality wnl, urine Na <20  - Stool studies, Legionella,Giardia/Cryptosporidium pending    - Will continue to monitor and replete as necessary, keep K>4, Phos>3, Mg>2   - Restarted NS at 75cc/hr due to decreased PO intake      Normocytic anemia   - H/H stable this AM   - Ordered FOBT given hx of hematochezia; patient still  has not had bowel movement  - Continue to monitor H/H for further drop and need for intervention     Malnutrition   - Consulted Nutrition  - Continue neutropenic diet and encourage oral intake   - Continue chocolate boost TID, tolerating PO intake   - Increased to megace 400 mg qd to stimulate appetite per recommendation          Access: Peripheral   Antibiotics: Bactrim   Diet: Regular as tolerated    DVT Prophylaxis: Lovenox 30mg qd   GI Prophylaxis: None   Fluids: NS 75 ml/hr       Disposition: Continue Bactrim for PJP infection. Respiratory culture positive for MSSA. Continue infectious disease workup for newly diagnosed HIV. Likely discharge tomorrow due to increasing PO tolerance.        Cecile Kim MD  [facility], [facility]

## 2023-01-26 ENCOUNTER — DOCUMENTATION ONLY (OUTPATIENT)
Dept: PHARMACY | Facility: HOSPITAL | Age: 28
End: 2023-01-26
Payer: MEDICAID

## 2023-01-26 VITALS
DIASTOLIC BLOOD PRESSURE: 67 MMHG | BODY MASS INDEX: 17.72 KG/M2 | HEIGHT: 66 IN | HEART RATE: 77 BPM | RESPIRATION RATE: 20 BRPM | WEIGHT: 110.25 LBS | TEMPERATURE: 98 F | OXYGEN SATURATION: 100 % | SYSTOLIC BLOOD PRESSURE: 103 MMHG

## 2023-01-26 PROBLEM — Z21 HIV INFECTION: Status: ACTIVE | Noted: 2023-01-26

## 2023-01-26 PROBLEM — J18.9 PNEUMONIA DUE TO INFECTIOUS ORGANISM: Status: ACTIVE | Noted: 2023-01-26

## 2023-01-26 PROBLEM — B20 HIV INFECTION: Status: ACTIVE | Noted: 2023-01-26

## 2023-01-26 PROBLEM — D64.9 NORMOCYTIC ANEMIA: Status: ACTIVE | Noted: 2023-01-26

## 2023-01-26 LAB
ALBUMIN SERPL-MCNC: 2.1 G/DL (ref 3.5–5)
ALBUMIN/GLOB SERPL: 0.4 RATIO (ref 1.1–2)
ALP SERPL-CCNC: 157 UNIT/L (ref 40–150)
ALT SERPL-CCNC: 9 UNIT/L (ref 0–55)
AST SERPL-CCNC: 15 UNIT/L (ref 5–34)
BASOPHILS # BLD AUTO: 0 X10(3)/MCL (ref 0–0.2)
BASOPHILS NFR BLD AUTO: 0 %
BILIRUBIN DIRECT+TOT PNL SERPL-MCNC: 0.2 MG/DL
BUN SERPL-MCNC: 5.9 MG/DL (ref 8.9–20.6)
CALCIUM SERPL-MCNC: 8.9 MG/DL (ref 8.4–10.2)
CHLORIDE SERPL-SCNC: 107 MMOL/L (ref 98–107)
CO2 SERPL-SCNC: 21 MMOL/L (ref 22–29)
COLOR STL: NORMAL
CONSISTENCY STL: NORMAL
CREAT SERPL-MCNC: 0.74 MG/DL (ref 0.73–1.18)
CRYPTOSP AG SPEC QL: NEGATIVE
EOSINOPHIL # BLD AUTO: 0.02 X10(3)/MCL (ref 0–0.9)
EOSINOPHIL NFR BLD AUTO: 1.2 %
ERYTHROCYTE [DISTWIDTH] IN BLOOD BY AUTOMATED COUNT: 17.9 % (ref 11.5–17)
G LAMBLIA AG STL QL IA: NEGATIVE
GFR SERPLBLD CREATININE-BSD FMLA CKD-EPI: >60 MLS/MIN/1.73/M2
GLOBULIN SER-MCNC: 5.1 GM/DL (ref 2.4–3.5)
GLUCOSE SERPL-MCNC: 75 MG/DL (ref 74–100)
HCT VFR BLD AUTO: 31.5 % (ref 42–52)
HEMOCCULT SP1 STL QL: NEGATIVE
HGB BLD-MCNC: 10.3 GM/DL (ref 14–18)
IMM GRANULOCYTES # BLD AUTO: 0 X10(3)/MCL (ref 0–0.04)
IMM GRANULOCYTES NFR BLD AUTO: 0 %
LYMPHOCYTES # BLD AUTO: 0.54 X10(3)/MCL (ref 0.6–4.6)
LYMPHOCYTES NFR BLD AUTO: 32.3 %
MAGNESIUM SERPL-MCNC: 1.8 MG/DL (ref 1.6–2.6)
MCH RBC QN AUTO: 26.5 PG
MCHC RBC AUTO-ENTMCNC: 32.7 MG/DL (ref 33–36)
MCV RBC AUTO: 81 FL (ref 80–94)
MONOCYTES # BLD AUTO: 0.37 X10(3)/MCL (ref 0.1–1.3)
MONOCYTES NFR BLD AUTO: 22.2 %
NEUTROPHILS # BLD AUTO: 0.74 X10(3)/MCL (ref 2.1–9.2)
NEUTROPHILS NFR BLD AUTO: 44.3 %
NRBC BLD AUTO-RTO: 0 %
PHOSPHATE SERPL-MCNC: 3.1 MG/DL (ref 2.3–4.7)
PLATELET # BLD AUTO: 202 X10(3)/MCL (ref 130–400)
PMV BLD AUTO: 11 FL (ref 7.4–10.4)
POTASSIUM SERPL-SCNC: 4.6 MMOL/L (ref 3.5–5.1)
PROT SERPL-MCNC: 7.2 GM/DL (ref 6.4–8.3)
RBC # BLD AUTO: 3.89 X10(6)/MCL (ref 4.7–6.1)
SODIUM SERPL-SCNC: 134 MMOL/L (ref 136–145)
WBC # SPEC AUTO: 1.7 X10(3)/MCL (ref 4.5–11.5)

## 2023-01-26 PROCEDURE — 94761 N-INVAS EAR/PLS OXIMETRY MLT: CPT

## 2023-01-26 PROCEDURE — 25000003 PHARM REV CODE 250: Performed by: INTERNAL MEDICINE

## 2023-01-26 PROCEDURE — 83735 ASSAY OF MAGNESIUM: CPT

## 2023-01-26 PROCEDURE — 80053 COMPREHEN METABOLIC PANEL: CPT

## 2023-01-26 PROCEDURE — 25000003 PHARM REV CODE 250

## 2023-01-26 PROCEDURE — 63600175 PHARM REV CODE 636 W HCPCS: Performed by: STUDENT IN AN ORGANIZED HEALTH CARE EDUCATION/TRAINING PROGRAM

## 2023-01-26 PROCEDURE — 85025 COMPLETE CBC W/AUTO DIFF WBC: CPT

## 2023-01-26 PROCEDURE — S0179 MEGESTROL 20 MG: HCPCS | Performed by: INTERNAL MEDICINE

## 2023-01-26 PROCEDURE — 97116 GAIT TRAINING THERAPY: CPT

## 2023-01-26 PROCEDURE — 82270 OCCULT BLOOD FECES: CPT

## 2023-01-26 PROCEDURE — 84100 ASSAY OF PHOSPHORUS: CPT

## 2023-01-26 PROCEDURE — 87329 GIARDIA AG IA: CPT

## 2023-01-26 RX ORDER — SULFAMETHOXAZOLE AND TRIMETHOPRIM 800; 160 MG/1; MG/1
2 TABLET ORAL 3 TIMES DAILY
Qty: 84 TABLET | Refills: 0 | Status: SHIPPED | OUTPATIENT
Start: 2023-01-26 | End: 2023-02-23

## 2023-01-26 RX ORDER — FLUCONAZOLE 200 MG/1
200 TABLET ORAL DAILY
Qty: 7 TABLET | Refills: 0 | Status: SHIPPED | OUTPATIENT
Start: 2023-01-26 | End: 2023-02-02

## 2023-01-26 RX ORDER — MEGESTROL ACETATE 40 MG/ML
400 SUSPENSION ORAL DAILY
Qty: 300 ML | Refills: 0 | Status: SHIPPED | OUTPATIENT
Start: 2023-01-27 | End: 2023-03-09 | Stop reason: SDUPTHER

## 2023-01-26 RX ORDER — AZITHROMYCIN 600 MG/1
1200 TABLET, FILM COATED ORAL WEEKLY
Qty: 20 TABLET | Refills: 0 | Status: SHIPPED | OUTPATIENT
Start: 2023-01-31 | End: 2023-02-23 | Stop reason: SDUPTHER

## 2023-01-26 RX ADMIN — SULFAMETHOXAZOLE AND TRIMETHOPRIM 2 TABLET: 800; 160 TABLET ORAL at 03:01

## 2023-01-26 RX ADMIN — FLUCONAZOLE 200 MG: 2 INJECTION, SOLUTION INTRAVENOUS at 08:01

## 2023-01-26 RX ADMIN — ONDANSETRON 8 MG: 4 TABLET, ORALLY DISINTEGRATING ORAL at 03:01

## 2023-01-26 RX ADMIN — MEGESTROL ACETATE 400 MG: 40 SUSPENSION ORAL at 08:01

## 2023-01-26 NOTE — PT/OT/SLP PROGRESS
Physical Therapy Treatment    Patient Name:  Hanh Hassan   MRN:  [id number]    Recommendations:     Discharge Recommendations:  home health PT   Discharge Equipment Recommendations: walker, rolling   Barriers to discharge: Endurance    Assessment:     Hanh Hassan is a 27 y.o. male admitted with a medical diagnosis of <principal problem not specified>.  He presents with the following impairments/functional limitations:  weakness, gait instability, impaired balance .    Rehab Prognosis: Good; patient would benefit from acute skilled PT services to address these deficits and reach maximum level of function.    Recent Surgery: * No surgery found *      Plan:     During this hospitalization, patient to be seen  (3-5xwk) to address the identified rehab impairments via gait training, therapeutic activities and progress toward the following goals:    Plan of Care Expires:  02/16/23    Subjective     Chief Complaint: none  Patient/Family Comments/goals: return home  Pain/Comfort:         Objective:     Communicated with nurse Odom prior to session.  Patient found supine with peripheral IV upon PT entry to room.     General Precautions: Standard, neutropenic, fall   Orthopedic Precautions:N/A   Braces:    Respiratory Status: Room air     Functional Mobility:  Transfers:     Sit to Stand:  supervision with rolling walker  Gait: Pt. Ambulated with supervision in ndiaye using his RW, distance 260 ft = 2 laps around the nurses station.    Patient left supine with all lines intact, nurse notified, and mother present..    GOALS:   Multidisciplinary Problems       Physical Therapy Goals          Problem: Physical Therapy    Goal Priority Disciplines Outcome Goal Variances Interventions   Physical Therapy Goal     PT, PT/OT Ongoing, Progressing     Description: Goals to be met by: Discharge     Patient will increase functional independence with mobility by performing:    -. Supine to sit with Modified [place]-ONGOING  -. Sit  to supine with Modified [place]-ONGOING  -. Sit to stand transfer with Supervision -ONGOING  -. Gait  x 260 feet with Stand-by Assistance using Rolling Walker. -ONGOING                         Time Tracking:     PT Received On: 01/26/23  PT Start Time: 0827     PT Stop Time: 0840  PT Total Time (min): 13 min     Billable Minutes: Gait Training 13    Treatment Type: Treatment  PT/PTA: PT     PTA Visit Number: 0     01/26/2023

## 2023-01-26 NOTE — PROGRESS NOTES
[facility]  Internal Medicine  Progress Note      Patient Name: Hanh Hassan  : 1995  MRN: [id number]  Patient Class: IP- Inpatient   Admission Date: 2023   Length of Stay: 8  Admitting Service: Hospital Medicine  Attending Physician: Evelyn Kc MD   Senior Resident: Pietro Recinos MD   Intern: Cecile Kim MD   PCP: Primary Doctor No  Source of history: Patient, patient's family, and EMR.   Code status: Full     Chief Complaint   Fatigue and Weight Loss (Pt reports generalized weakness, fatigue, and weight loss over the past month. )      History of Present Illness   Hanh Hassan is a 27 y.o. male with no significant medical history who presented to [facility] ED on 2023  for blood work following recent ED visit in December.  In December, patient was seen in [facility] ED for sore throat, cough, and pain when swallowing solids and liquids. He was discharged and advised to get tested for HIV at the health unit, which he was unable to do so. During this time, he started experiencing weight loss, decreased appetite, nausea, vomiting, fatigue, and night sweats.  Within the last 2 weeks, patient reports fevers, rhinorrhea, and cough productive of white sputum.  Patient denies hemoptysis, chest pain, palpitations, abdominal pain, shortness of breath.  He denies constipation or diarrhea however reports occasional mucus and bright red blood in stools.  He also reports recent bilateral lower extremity weakness requiring assistance ambulating.      ED Course: Lab significant for leukopenia WBC 1.3, normocytic anemia, hyponatremia, hypophosphatemia, elevated ALP, and positive HIV.  Chest x-ray revealed patchy bilateral lung infiltrates, with dense consolidations in right middle lobe.  In the ED, he was given 1.5 L of normal saline, Zosyn, and Vancomycin.  Internal Medicine was consulted for new HIV diagnosis and pneumonia.    Interval History      NAEON. VSS. Mother at bedside. Continued  decrease Po intake. One small BM last night. Declined AM bactrim dose. Denies fever, chills.    ROS   Review of Systems   Constitutional:  Positive for malaise/fatigue. Negative for chills and fever.   Respiratory:  Negative for cough, sputum production and shortness of breath.    Cardiovascular:  Negative for chest pain.   Gastrointestinal:  Negative for abdominal pain, nausea and vomiting.   Musculoskeletal:  Negative for myalgias.   Skin:  Negative for rash.   Neurological:  Negative for dizziness and headaches.         Home Medications     Prior to Admission medications    Medication Sig Start Date End Date Taking? Authorizing Provider   aluminum & magnesium hydroxide-simethicone (MYLANTA MAX STRENGTH) 400-400-40 mg/5 mL suspension Take 10 mLs by mouth every 6 (six) hours as needed for Indigestion.   Yes Historical Provider        Inpatient Medications   Scheduled Meds   azithromycin  1,200 mg Oral Weekly    enoxaparin  30 mg Subcutaneous Daily    fluconazole (DIFLUCAN) IV (PEDS and ADULTS)  200 mg Intravenous Q24H    megestroL  400 mg Oral Daily    sodium phosphate IVPB  15 mmol Intravenous Once    sulfamethoxazole-trimethoprim 800-160mg  2 tablet Oral TID     Continuous Infusions   sodium chloride 0.9% 75 mL/hr at 01/25/23 1442     PRN Meds  sodium chloride, acetaminophen, dextrose 10%, dextrose 10%, glucagon (human recombinant), glucose, glucose, naloxone, ondansetron, ondansetron, sodium chloride 0.9%    Physical Exam   Vital Signs  Temp:  [98 °F (36.7 °C)-98.1 °F (36.7 °C)]   Pulse:  [58-81]   BP: (103-114)/(67-73)   SpO2:  [99 %-100 %]       General: Appears comfortable, thin, frail  HEENT: NC/AT  Neck:  No JVD  Chest: CTABL  CVS: Regular rhythm. Normal S1/S2.  Abdomen: nondistended, normoactive BS, soft and non-tender.  MSK: No obvious deformity or joint swelling, spontaneous movement of extremities   Skin: Warm and dry  Neuro: AAOx3, no focal neurological deficit  Psych: Cooperative    Labs     Recent  Labs     01/25/23 0352 01/26/23  0321   WBC 1.5* 1.7*   RBC 4.23* 3.89*   HGB 10.9* 10.3*   HCT 34.6* 31.5*   MCV 81.8 81.0   MCH 25.8 26.5   MCHC 31.5* 32.7*   RDW 17.5* 17.9*    202     No results for input(s): LACTIC in the last 72 hours.  No results for input(s): INR, APTT, D-DIMER in the last 72 hours.  No results for input(s): HGBA1C, CHOL, TRIG, LDL, VLDL, HDL in the last 72 hours.  No results for input(s): PH, PCO2, PO2, HCO3, POCSATURATED, BE in the last 72 hours.    Recent Labs     01/25/23 0352 01/26/23 0321   * 134*   K 4.9 4.6   CHLORIDE 104 107   CO2 21* 21*   BUN 5.3* 5.9*   CREATININE 0.79 0.74   GLUCOSE 70* 75   CALCIUM 8.8 8.9   MG 1.90 1.80   PHOS 3.0 3.1   ALBUMIN 2.1* 2.1*   GLOBULIN 5.3* 5.1*   ALKPHOS 160* 157*   ALT 10 9   AST 19 15   BILITOT 0.3 0.2     No results for input(s): BNP, CPK, TROPONINI in the last 72 hours.       Microbiology Results (last 7 days)       Procedure Component Value Units Date/Time    Blood Culture [[id number]]  (Normal) Collected: 01/18/23 1425    Order Status: Completed Specimen: Blood from Forearm, Right Updated: 01/23/23 1901     CULTURE, BLOOD ([facility]) No Growth at 5 days    Blood Culture [[id number]]  (Normal) Collected: 01/18/23 1402    Order Status: Completed Specimen: Blood from Arm, Right Updated: 01/23/23 1901     CULTURE, BLOOD ([facility]) No Growth at 5 days    Respiratory Culture [[id number]]  (Abnormal)  (Susceptibility) Collected: 01/18/23 1608    Order Status: Completed Specimen: Sputum, Expectorated Updated: 01/22/23 1110     Respiratory Culture Moderate Yeast      Moderate Methicillin Sensitive Staphylococcus aureus     Comment: with normal respiratory shree        GRAM STAIN Quality 1+      Many Gram positive cocci      Many Gram Negative Rods      Many Gram Positive Rods      Few Yeast    Urine culture [[id number]] Collected: 01/18/23 1232    Order Status: Completed Specimen: Urine Updated: 01/20/23 0719     Urine Culture No Significant Growth     Chlamydia/GC, PCR [[id number]]  (Normal) Collected: 01/19/23 2235    Order Status: Completed Specimen: Urine Updated: 01/20/23 0215     Chlamydia trachomatis PCR Not Detected     N. gonorrhea PCR Not Detected    Narrative:      The Xpert CT/NG test, performed on the [employer] system is a qualitative in vitro real-time polymerase chain reaction (PCR) test for the automated detected and differentiation for genomic DNA from Chlamydia trachomatis (CT) and/or Neisseria gonorrhoeae (NG).           Imaging     CT Abdomen Pelvis With Contrast   Final Result      1. Findings concerning for multifocal pneumonia in the lung bases.   2. No acute abnormality of the abdomen or pelvis.         Electronically signed by: Amairani Cruz   Date:    01/18/2023   Time:    14:34      X-Ray Chest PA And Lateral   ED Interpretation   Signs of pneumonia in the right lower lobe.  No cardiomegaly or pneumothorax      Final Result      Bilateral lungs infiltrates.         Electronically signed by: Tom Quezada   Date:    01/18/2023   Time:    13:59        Assessment & Plan     HIV  Oral candidiasis   - HIV 1/2 Ag/Ab reactive   - Hepatitis panel negative, Cryptococcus negative  - Gonorrhea/Chlamydia negative   - Syphilis Ab reactive, RPR reactive. Treated at [facility] with single dose penicillin G for secondary syphilis on 12/19/21. Contacted Infectious disease and confirmed completed treatment for secondary syphilis on 12/2021. Initial ; repeat was 64  - Respiratory culture positive for moderate Yeast  - Continue IV fluconazole 200 mg qd for total of 14 days - started on 1/20/23, ending 2/04/23   - CD4 count 7.9; viral load of 340,000     Community Acquired PNA  Suspected PJP  Sepsis   - SIRS 3/4: leukopenia, tachycardia, fever with respiratory source   - Chest xray revealed patchy bilateral infiltrates with dense consolidation  in right middle lobe   - COVID/FLU PCR negative  - Neutropenic precautions   - Respiratory culture positive  for methicillin sensitive staph. Aureus and moderate yeast  - Blood culture no growth at 5 days  - Continue Bactrim for PJP infection, transition to PO 800ds 2 tablets TID and Azithromycin 1,200mg PO MAC prophylaxis   - PJP PCR pending, Fungitell positive   -1, 3 beta-D-glucan positive      Hyponatremia  Hypophosphatemia   - Will continue to monitor and replete as necessary, keep K>4, Phos>3, Mg>2   - NS at 75cc/hr due to decreased PO intake      Normocytic anemia   - H/H stable this AM   - Ordered FOBT given hx of hematochezia; patient still has not had bowel movement     Malnutrition   - Consulted Nutrition  - Continue chocolate boost TID, tolerating PO intake   - Increased to megace 400 mg qd           Access: Peripheral   Antibiotics: Bactrim   Diet: Regular as tolerated    DVT Prophylaxis: Lovenox 30mg qd   GI Prophylaxis: None   Fluids: NS 75 ml/hr       Disposition: Continue Bactrim for PJP infection. Respiratory culture positive for MSSA. Plan to discharge home with Fluconazole, bactrim and azithromycin. F/u w/ ID.       Jeannette Liriano M.D.  [facility]

## 2023-01-26 NOTE — PROGRESS NOTES
[facility] filled bactrim, diflucan, magace and zithromax and delivered to bedside.

## 2023-01-26 NOTE — MEDICAL/APP STUDENT
[facility] Medicine Wards Progress Note     Resident Team: [facility] Medicine List 1  Attending Physician: Evelyn Kc MD  Resident: ***  Intern: ***       Subjective:      Brief HPI:  Hanh Hassan is a 27 y.o. male with no significant medical history who presented to [facility] ED on 1/18/2023  for blood work following recent ED visit in December. In December, patient was seen in [facility] ED for sore throat, cough, and pain when swallowing solids and liquids. He was discharged and advised to get tested for HIV at the health unit, which he was unable to do so. During this time, he started experiencing weight loss, decreased appetite, nausea, vomiting, fatigue, and night sweats.  Within the last 2 weeks, patient reports fevers, rhinorrhea, and cough productive of white sputum.  Patient denies hemoptysis, chest pain, palpitations, abdominal pain, shortness of breath.  He denies constipation or diarrhea however reports occasional mucus and bright red blood in stools.  He also reports recent bilateral lower extremity weakness requiring assistance ambulating.  Lab significant for leukopenia WBC 1.3, normocytic anemia, hyponatremia, hypophosphatemia, elevated ALP, and positive HIV.  Chest x-ray revealed patchy bilateral lung infiltrates, with dense consolidations in right middle lobe.  In the ED, he was given 1.5 L of normal saline, Zosyn, and Vancomycin.  Internal Medicine was consulted for new HIV diagnosis and pneumonia.    Interval History: NAEON. AFVSS. Pt tolerating PO intake w/o n/v. Reports vomiting with PO bactrim, would like to know if they can change meds or return to IV administration. Ambulated with  ft with rolling walker yesterday. Not seen by OT yesterday d/t pt eating during both visits.Rolling walker delivered and in room.  approval pending per CM note.    Review of Systems:  ROS completed and negative except as indicated above.     Objective:     Last 24 Hour Vital Signs:  BP  Min: 96/65  Max: 119/80  Temp  Avg:  97.7 °F (36.5 °C)  Min: 97.3 °F (36.3 °C)  Max: 98.2 °F (36.8 °C)  Pulse  Av.8  Min: 58  Max: 83  Resp  Av.3  Min: 18  Max: 20  SpO2  Av.4 %  Min: 98 %  Max: 100 %  I/O last 3 completed shifts:  In: 120 [P.O.:120]  Out: -     Physical Examination:  General: malnourished, in no acute distress  HENT: NC/AT, moist mucus membranes  Respiratory: clear to auscultation bilaterally, respirations non-labored  Cardiovascular: regular rate and rhythm without murmurs, gallops or rubs  Gastrointestinal: soft, NTND, no rebound tenderness  Musculoskeletal: no obvious deformities, full range of motion of all extremities  Integumentary: hypopigmented patches located on scalp  Extremities: no LE edema  Neurologic: no signs of peripheral neurological deficit, motor/sensory function intact    Laboratory:  Most Recent Data:  CBC:   Lab Results   Component Value Date    WBC 1.7 (LL) 2023    HGB 10.3 (L) 2023    HCT 31.5 (L) 2023     2023    MCV 81.0 2023    RDW 17.9 (H) 2023     WBC Differential:   Recent Labs   Lab 23  0258 23  0300 23  0317 23  0352 23  0321   WBC 1.2* 1.1* 1.0* 1.5* 1.7*   HGB 10.8* 10.7* 10.7* 10.9* 10.3*   HCT 33.7* 33.1* 32.8* 34.6* 31.5*    245 236 227 202   MCV 79.5* 79.6* 80.0 81.8 81.0     BMP:   Lab Results   Component Value Date     (L) 2023    K 4.6 2023    CO2 21 (L) 2023    BUN 5.9 (L) 2023    CREATININE 0.74 2023    CALCIUM 8.9 2023    MG 1.80 2023    PHOS 3.1 2023     LFTs:   Lab Results   Component Value Date    ALBUMIN 2.1 (L) 2023    BILITOT 0.2 2023    AST 15 2023    ALKPHOS 157 (H) 2023    ALT 9 2023     Coags: No results found for: INR, PROTIME, PTT  FLP:   Lab Results   Component Value Date    CHOL 56 2023    HDL 11 (L) 2023    TRIG 72 2023     DM:   Lab Results   Component Value Date    HGBA1C 5.5  01/18/2023    CREATININE 0.74 01/26/2023     Thyroid:   Lab Results   Component Value Date    TSH 1.435 01/18/2023     Anemia:   Lab Results   Component Value Date    IRON 7 (L) 01/18/2023    TIBC 125 (L) 01/18/2023    FERRITIN 2,325.58 (H) 01/18/2023     Cardiac:   Lab Results   Component Value Date    TROPONINI <0.010 01/18/2023    BNP 51.8 01/18/2023       Microbiology Data Reviewed: yes  Pertinent Findings:  Microbiology Results (last 7 days)       Procedure Component Value Units Date/Time    Blood Culture [[id number]]  (Normal) Collected: 01/18/23 1425    Order Status: Completed Specimen: Blood from Forearm, Right Updated: 01/23/23 1901     CULTURE, BLOOD ([facility]) No Growth at 5 days    Blood Culture [[id number]]  (Normal) Collected: 01/18/23 1402    Order Status: Completed Specimen: Blood from Arm, Right Updated: 01/23/23 1901     CULTURE, BLOOD ([facility]) No Growth at 5 days    Respiratory Culture [[id number]]  (Abnormal)  (Susceptibility) Collected: 01/18/23 1608    Order Status: Completed Specimen: Sputum, Expectorated Updated: 01/22/23 1110     Respiratory Culture Moderate Yeast      Moderate Methicillin Sensitive Staphylococcus aureus     Comment: with normal respiratory shree        GRAM STAIN Quality 1+      Many Gram positive cocci      Many Gram Negative Rods      Many Gram Positive Rods      Few Yeast    Urine culture [[id number]] Collected: 01/18/23 1232    Order Status: Completed Specimen: Urine Updated: 01/20/23 0719     Urine Culture No Significant Growth    Chlamydia/GC, PCR [[id number]]  (Normal) Collected: 01/19/23 2235    Order Status: Completed Specimen: Urine Updated: 01/20/23 0215     Chlamydia trachomatis PCR Not Detected     N. gonorrhea PCR Not Detected    Narrative:      The Xpert CT/NG test, performed on the GeneXpert system is a qualitative in vitro real-time polymerase chain reaction (PCR) test for the automated detected and differentiation for genomic DNA from Chlamydia trachomatis (CT) and/or  Neisseria gonorrhoeae (NG).             Other Results:  EKG (my interpretation): unchanged from previous tracings.    Radiology:  Imaging Results              CT Abdomen Pelvis With Contrast (Final result)  Result time 01/18/23 14:34:11      Final result by Amairani Cruz MD (01/18/23 14:34:11)                   Impression:      1. Findings concerning for multifocal pneumonia in the lung bases.  2. No acute abnormality of the abdomen or pelvis.      Electronically signed by: Amairani Cruz  Date:    01/18/2023  Time:    14:34               Narrative:    EXAMINATION:  CT ABDOMEN PELVIS WITH CONTRAST    CLINICAL HISTORY:  Flank pain, kidney stone suspected;    TECHNIQUE:  CT imaging was performed of the abdomen and pelvis after the administration of intravenous contrast. Dose length product is 157 mGycm. Automatic exposure control, adjustment of mA/kV or iterative reconstruction technique was used to limit radiation dose.    COMPARISON:  None    FINDINGS:  Liver: Normal.    Gallbladder and biliary tree: No calcified gallstones. No intra or extrahepatic biliary ductal dilation.    Pancreas: Normal.    Spleen: Normal.    Adrenals: Normal.    Kidneys and ureters: There is no obstructing urinary calculus.  There is no hydronephrosis.    Bladder: Normal.    Reproductive organs: No pelvic masses.    Stomach/bowel: No evidence of bowel obstruction. The appendix is not clearly identified.  No discernible bowel inflammation.    Lymph nodes: No pathologically enlarged lymph node identified.    Peritoneum: No ascites or free air. No fluid collection.    Vessels: No abdominal aortic aneurysm.    Abdominal wall: Normal.    Lung bases: There are consolidative changes in the right lower and middle lobes with additional scattered bibasilar airspace opacities.    Bones: No acute osseous findings.                                       X-Ray Chest PA And Lateral (Final result)  Result time 01/18/23 13:59:35      Final result by  Tom Quezada MD (01/18/23 13:59:35)                   Impression:      Bilateral lungs infiltrates.      Electronically signed by: Tom Quezada  Date:    01/18/2023  Time:    13:59               Narrative:    EXAMINATION:  XR CHEST PA AND LATERAL    CLINICAL HISTORY:  SV;    TECHNIQUE:  Two views    COMPARISON:  December 6, 2022.    FINDINGS:  Cardiopericardial silhouette is within normal limits.  There is dense consolidation which involves the right middle lung lobe.  There are additional patchy infiltrates which involve the right lower lung lobe and to a lesser degree the left lower lung lobe.  No pulmonary edema or pneumothorax.  No fluid accumulation within the pleural spaces.                        ED Interpretation by Delfino Mishra MD (01/18/23 13:56:26, [facility], Emergency Medicine)    Signs of pneumonia in the right lower lobe.  No cardiomegaly or pneumothorax                                    Current Medications:     Infusions:   sodium chloride 0.9% 75 mL/hr at 01/25/23 1442        Scheduled:   azithromycin  1,200 mg Oral Weekly    enoxaparin  30 mg Subcutaneous Daily    fluconazole (DIFLUCAN) IV (PEDS and ADULTS)  200 mg Intravenous Q24H    megestroL  400 mg Oral Daily    sodium phosphate IVPB  15 mmol Intravenous Once    sulfamethoxazole-trimethoprim 800-160mg  2 tablet Oral TID        PRN:  sodium chloride, acetaminophen, dextrose 10%, dextrose 10%, glucagon (human recombinant), glucose, glucose, naloxone, ondansetron, ondansetron, sodium chloride 0.9%    Antibiotics and Day Number of Therapy:  Bactrim Day 7  Fluconazole Day 5  Azithromycin Day 3    Lines and Day Number of Therapy:  pIV    Assessment & Plan:   HIV  Oral candidiasis   - HIV 1/2 Ag/Ab reactive   - CD4 Count 7.9; Viral Load 340,000  - Hepatitis panel, cryptococcus, toxoplasma negative  - Gonorrhea/Chlamydia negative   - Syphilis Ab reactive, RPR reactive. Treated at [facility] with single dose penicillin G  for secondary syphilis on 12/19/21. Contacted Infectious disease and confirmed completed treatment for secondary syphilis on 12/2021. Initial ; repeat was 64  - Continue IV fluconazole 200 mg QD for total of 14 days  - Continue PO azithromycin 1200 mg weekly for MAC Ppx d/t CD4 count <150  - CMV w/u?  - Speech therapy consult placed for swallow study, recommends thin liquids and regular consistencies; consider EGD     CAP  Suspected PJP  Sepsis   - SIRS 3/4: leukopenia, tachycardia, fever with respiratory source   - Chest XR revealed patchy bilateral infiltrates with dense consolidation in right middle lobe   - COVID/FLU PCR, legionella negative  - Fungitell positive, 185  - PJP PCR pending   - WBC 1.7 this AM from 1.5    - Neutropenic precautions   - Respiratory Cx positive for moderate yeast and MSSA  - BCx no growth at 5d  - Continue PO Bactrim 800 160 mg 2 tabs TID Bactrim for presumed PJP infection; monitor for hyponatremia  - Continue IVF with NS 75 mL/hr     Hyponatremia  - Sodium improved at 134 this AM from 133  - Serum osmolality 272, urine osmolality wnl, urine Na <20  - Stool studies, Legionella,Giardia/Cryptosporidium pending    - Will continue to monitor and replete as necessary     Normocytic anemia   - H/H decreased to 10.3/31.5 this AM from 10.9/34.6    - FOBT pending given hx of hematochezia; patient still has not had BM  - Continue to monitor H/H for further drop and need for intervention     Malnutrition   - Consulted Nutrition  - Consult GI given pt's reported hx of hematochezia  - Continue neutropenic diet and encourage oral intake   - Continue chocolate boost TID   - Continue megace 400 mg QD to stimulate appetite per recommendation      CODE STATUS: Fulll Code  Access: pIV  Antibiotics: bactrim, fluconazole, azithromycin  Diet: neutropenic  DVT Prophylaxis: lovenox  GI Prophylaxis: none  Fluids: NS 65 mL/hr      Disposition: D/c with rolling walker. F/u HH acceptance.    [place]  Esme MS4  [facility]-NO Medical Student

## 2023-01-27 ENCOUNTER — TELEPHONE (OUTPATIENT)
Dept: INFECTIOUS DISEASES | Facility: CLINIC | Age: 28
End: 2023-01-27
Payer: MEDICAID

## 2023-01-27 DIAGNOSIS — B20 HIV INFECTION, UNSPECIFIED SYMPTOM STATUS: Primary | ICD-10-CM

## 2023-01-27 NOTE — PT/OT/SLP DISCHARGE
Occupational Therapy Discharge Summary    Hanh Hassan  MRN: [id number]   Principal Problem: HIV infection      Patient Discharged from acute Occupational Therapy on 1/26/23.  Please refer to prior OT note dated 1/19/23 for functional status.    Assessment:       Recommend DC home with home health and SPV/SBA from family.    Objective:     GOALS:   Multidisciplinary Problems       Occupational Therapy Goals          Problem: Occupational Therapy    Goal Priority Disciplines Outcome Interventions   Occupational Therapy Goal     OT, PT/OT Ongoing, Progressing    Description: Patient will perform feeding with mod I      Patient will perform grooming with mod I while standing at sink    Patient will perform toileting with mod I    Patient will perform toilet transfer with mod I with use of RW or grab bars    Patient will perform upper body dressing with mod I while seated EOB     Patient will perform lower body dressing with mod I while seated EOB                          Reasons for Discontinuation of Therapy Services  Satisfactory goal achievement. and despite limited therapy session, pt progressed to SPV/SBA level for ADLs but still limited by significant deficits in strength and endurance.  Pt will benefit from home health services at DC to continue to work toward increased strength and endurance to progress beyond SBA/SPV level.  Pt medically discharged from hospital.       Plan:     Patient Discharged to: Home with Home Health Service and note per CM multiple HH services denied pt.; pt is able to perform ADLs and mobility at SPV/SBA level and will have support from family.    1/27/2023

## 2023-01-27 NOTE — DISCHARGE SUMMARY
[facility] - [address] [place] [address] Medicine  Discharge Summary      Patient Name: Hanh Hassan  MRN: [id number]  DAVID: [id number]  Patient Class: IP- Inpatient  Admission Date: 1/18/2023  Hospital Length of Stay: 8 days  Discharge Date and Time: 1/26/2023  6:31 PM  Attending Physician: Chelsey att. providers found   Discharging Provider: Jeannette Liriano MD  Primary Care Provider: Primary Doctor Chelsey    Primary Care Team: Networked reference to record PCT     HPI:     Hanh Hassan is a 27 y.o. male with no significant medical history who presented to [facility] ED on 1/18/2023  for blood work following recent ED visit in December.  In December, patient was seen in [facility] ED for sore throat, cough, and pain when swallowing solids and liquids.  He was also noted to have several oral lesions on soft palate. At that time, he was treated as viral illness with supportive care and discharged home  from ED with Carafate.  He was also advised to get tested for HIV at the health unit, which he was unable to do so. During this time, he started experiencing weight loss, decreased appetite, nausea, vomiting, fatigue, and night sweats.  Within the last 2 weeks, patient reports fevers, rhinorrhea, and cough productive of white sputum.  Patient denies hemoptysis, chest pain, palpitations, abdominal pain, shortness of breath.  He denies constipation or diarrhea however reports occasional mucus and bright red blood in stools.  He also reports recent bilateral lower extremity weakness requiring assistance ambulating.      His last sexual encounter was in July 2022.  He has multiple sexual partners (men) and he is the receptive partner.  He denies any dysuria, urinary frequency, discharge, lesions, or rash.  He denies tobacco use, current alcohol use, or drug use.  He does not take any daily medications and he does not have a PCP.     Hospital Course:      In the ED, pt was febrile, normotensive, tachycardic (-168), and  satting 97% on room air.  Lab significant for leukopenia WBC 1.3, normocytic anemia, hyponatremia, hypophosphatemia, elevated ALP, and positive HIV.  Chest x-ray revealed patchy bilateral lung infiltrates, with dense consolidations in right middle lobe. Internal Medicine was consulted for new HIV diagnosis and pneumonia.  Pt received broad spectrum coverage with Zosyn, Vancomycin, and bactrim for PNA. suspected PJP infection. De-escalated to Bactrim only then transitioned to oral for last days of admission. Pt also found to have oral candidiasis with esophagitis. Fluconazole initiated. Pt found to be weak, nauseous and with substantial decreased PO intake. Electrolytes repleted. Nutrition consulted and dietary supplements and megace given. Pt received PT/OT services. Began to tolerate PO intake. Infectious work up performed to confirm no further opportunistic infections due to patients significantly low CD4 count. Pt with hx of syphilis. Dils repeated with appropriate decrease since treatment.  Pt to continue with Bactrim and Fluconazole at discharge and prophylactic Azithromycin weekly. Referral placed with infectious disease to began treatment. Pt has support of aunt and mother will discharge in mother's care with post- wards appt as pt does not have PCP.    Goals of Care Treatment Preferences:  Code Status: Full Code      Consults:   Consults (From admission, onward)        Status Ordering Provider     Inpatient consult to Social Work/Case Management  Once        Provider:  (Not yet assigned)    Completed JESSIKA RAMÍREZ     Inpatient consult to Social Work/Case Management  Once        Provider:  (Not yet assigned)    Completed JESSIKA RAMÍREZ     Inpatient consult to Social Work/Case Management  Once        Provider:  (Not yet assigned)    Completed JESSIKA RAMÍREZ     Inpatient consult to Registered Dietitian/Nutritionist  Once        Provider:  (Not yet assigned)    Completed MANJIT HOUSTON           No new Assessment & Plan notes have been filed under this hospital service since the last note was generated.  Service: Hospital Medicine    Final Active Diagnoses:    Diagnosis Date Noted POA    PRINCIPAL PROBLEM:  HIV infection [B20] 01/26/2023 Yes    Pneumonia due to infectious organism [J18.9] 01/26/2023 Yes    Normocytic anemia [D64.9] 01/26/2023 Yes      Problems Resolved During this Admission:       Discharged Condition: stable    Disposition: Home or Self Care    Follow Up:   Follow-up Information     [facility] Follow up in 2 week(s).    Specialty: Family Medicine  Contact information:  [address] [place] [id number] [phone]           [facility] Infectious Disease. Schedule an appointment as soon as possible for a visit.    Specialty: Infectious Diseases  Contact information:  [address] [place] [id number] [phone]                     Patient Instructions:      Ambulatory referral/consult to Infectious Disease   Standing Status: Future   Referral Priority: Urgent Referral Type: Consultation   Referral Reason: Specialty Services Required   Requested Specialty: Infectious Diseases   Number of Visits Requested: 1     Diet Adult Regular   Order Comments: No food restrictions. Supplement meals with Ensure or boost drinks       Significant Diagnostic Studies: Labs:   CMP   Recent Labs   Lab 01/26/23  0321   *   K 4.6   CO2 21*   BUN 5.9*   CREATININE 0.74   CALCIUM 8.9   ALBUMIN 2.1*   BILITOT 0.2   ALKPHOS 157*   AST 15   ALT 9   , CBC   Recent Labs   Lab 01/26/23  0321   WBC 1.7*   HGB 10.3*   HCT 31.5*          Respiratory Culture Moderate Yeast Abnormal        Moderate Methicillin Sensitive Staphylococcus aureus Abnormal     with normal respiratory shree                RPR Non-Reactive Reactive Abnormal   Reactive Abnormal  R    RPR Titer (none) 64 dils Abnormal        HIV-1 RNA Detect/Quant, P  Undetected copies/mL [id number] Abnormal       WBC Absolute 4,500 - 11,500 /mm3 1,100 Low     Lymph Percent 28 - 48 % 29    Lymph Absolute 1,260 - 5,520 x10(3)/mcL 319 Low     CD4 Pct % 2.5    Comment: Result verified by repeat testing.      CD4 Absolute 589 - 1,505 unit/L 7.975 Low           nd All labs within the past 24 hours have been reviewed    Pending Diagnostic Studies:     Procedure Component Value Units Date/Time    EXTRA TUBES [[id number]] Collected: 01/20/23 2043    Order Status: Sent Lab Status: In process Updated: 01/20/23 2044    Specimen: Blood, Venous     Narrative:      The following orders were created for panel order EXTRA TUBES.  Procedure                               Abnormality         Status                     ---------                               -----------         ------                     Red Top Hold[[id number]]                                     In process                 Gold Top Hold[[id number]]                                    In process                   Please view results for these tests on the individual orders.    EXTRA TUBES [[id number]] Collected: 01/18/23 1222    Order Status: Sent Lab Status: In process Updated: 01/18/23 1222    Specimen: Blood, Venous     Narrative:      The following orders were created for panel order EXTRA TUBES.  Procedure                               Abnormality         Status                     ---------                               -----------         ------                     Light Blue Top Hold[[id number]]                              In process                 Red Top Hold[[id number]]                                     In process                   Please view results for these tests on the individual orders.         Medications:  Reconciled Home Medications:      Medication List      START taking these medications    azithromycin 600 MG Tab  Commonly known as: ZITHROMAX  Take 2 tablets (1,200 mg total) by mouth once a week.  Start taking on:  January 31, 2023     fluconazole 200 MG Tab  Commonly known as: DIFLUCAN  Take 1 tablet (200 mg total) by mouth once daily. for 7 days     megestroL 400 mg/10 mL (10 mL) Susp  Commonly known as: MEGACE  Take 10 mLs (400 mg total) by mouth once daily.     sulfamethoxazole-trimethoprim 800-160mg 800-160 mg Tab  Commonly known as: BACTRIM DS  Take 2 tablets by mouth 3 (three) times daily. for 14 days        CONTINUE taking these medications    aluminum & magnesium hydroxide-simethicone 400-400-40 mg/5 mL suspension  Commonly known as: MYLANTA MAX STRENGTH  Take 10 mLs by mouth every 6 (six) hours as needed for Indigestion.            Indwelling Lines/Drains at time of discharge:   Lines/Drains/Airways     None                 Time spent on the discharge of patient: >30 minutes         Jeannette Liriano MD  Department of Hospital Medicine  [facility] Med Surg Telemetry

## 2023-01-27 NOTE — TELEPHONE ENCOUNTER
Received referral from Dr. Jeannette Liriano from when patient was inpatient for new diagnosis HIV. Attempted to contact patient to schedule intake. No answer. Voicemail left to call the clinic back.

## 2023-01-30 NOTE — TELEPHONE ENCOUNTER
Patient returned the call. Intake scheduled for 02/08/2023 at 10am per patient request. Informed patient on location of the clinic and to bring DL/insurance cards to the appt. Patient voiced understanding.

## 2023-01-30 NOTE — TELEPHONE ENCOUNTER
Rosaline, pt's mother, called the clinic back stating she is at work and will have the patient contact the clinic as soon as she gets home from work. Understanding voiced.

## 2023-01-30 NOTE — TELEPHONE ENCOUNTER
Attempted to contact patient to schedule B20 intake. No answer. Voicemail left to call the clinic back.

## 2023-01-31 ENCOUNTER — NURSE TRIAGE (OUTPATIENT)
Dept: ADMINISTRATIVE | Facility: CLINIC | Age: 28
End: 2023-01-31
Payer: MEDICAID

## 2023-01-31 ENCOUNTER — HOSPITAL ENCOUNTER (EMERGENCY)
Facility: HOSPITAL | Age: 28
Discharge: HOME | End: 2023-01-31
Attending: STUDENT IN AN ORGANIZED HEALTH CARE EDUCATION/TRAINING PROGRAM
Payer: MEDICAID

## 2023-01-31 VITALS
RESPIRATION RATE: 18 BRPM | SYSTOLIC BLOOD PRESSURE: 132 MMHG | TEMPERATURE: 98 F | DIASTOLIC BLOOD PRESSURE: 84 MMHG | BODY MASS INDEX: 14.46 KG/M2 | HEIGHT: 70 IN | WEIGHT: 101 LBS | HEART RATE: 62 BPM | OXYGEN SATURATION: 98 %

## 2023-01-31 DIAGNOSIS — L98.411 SKIN ULCER OF BUTTOCK, LIMITED TO BREAKDOWN OF SKIN: ICD-10-CM

## 2023-01-31 DIAGNOSIS — B20 HIV INFECTION, UNSPECIFIED SYMPTOM STATUS: Primary | ICD-10-CM

## 2023-01-31 PROCEDURE — 99284 EMERGENCY DEPT VISIT MOD MDM: CPT

## 2023-01-31 RX ORDER — CLINDAMYCIN HYDROCHLORIDE 300 MG/1
300 CAPSULE ORAL EVERY 6 HOURS
Qty: 28 CAPSULE | Refills: 0 | Status: SHIPPED | OUTPATIENT
Start: 2023-01-31 | End: 2023-02-07

## 2023-01-31 RX ORDER — MUPIROCIN 20 MG/G
OINTMENT TOPICAL 3 TIMES DAILY
Qty: 30 G | Refills: 0 | Status: SHIPPED | OUTPATIENT
Start: 2023-01-31 | End: 2023-02-10

## 2023-02-01 NOTE — ED PROVIDER NOTES
Encounter Date: 1/31/2023       History     Chief Complaint   Patient presents with    Skin Ulcer     Left buttock skin breakdown after recent hospitalization     The history is provided by the patient.   Abscess   This is a new problem. The current episode started yesterday. The abscess is present on the left buttock. The abscess is characterized by painfulness. Pertinent negatives include no fever. Patient reports he feels great otherwise. Pertinent history includes recent HIV dx and currently on azithromycin and bactrim for PNA. No pulmonary issues or other concerns.     Review of patient's allergies indicates:   Allergen Reactions    Latex      Other reaction(s): rash    Latex, natural rubber      No past medical history on file.  No past surgical history on file.  No family history on file.  Social History     Tobacco Use    Smoking status: Never    Smokeless tobacco: Never   Substance Use Topics    Drug use: Never     Review of Systems   Constitutional:  Negative for fever.   Pertinent positive and negative as mentioned in HPI.     Physical Exam     Initial Vitals [01/31/23 2218]   BP Pulse Resp Temp SpO2   125/79 (!) 55 20 98.1 °F (36.7 °C) 95 %      MAP       --         Physical Exam    Nursing note and vitals reviewed.  Constitutional: He is not diaphoretic. No distress.   Thin   HENT:   Head: Normocephalic and atraumatic.   Eyes: Conjunctivae and EOM are normal. Pupils are equal, round, and reactive to light.   Neck: Neck supple. No tracheal deviation present.   Normal range of motion.  Cardiovascular:  Normal rate, regular rhythm and normal heart sounds.           Pulmonary/Chest: Breath sounds normal. No respiratory distress.   Abdominal: Abdomen is soft. He exhibits no distension. There is no abdominal tenderness. There is no rebound.   Musculoskeletal:         General: No tenderness. Normal range of motion.      Cervical back: Normal range of motion and neck supple.     Neurological: He is alert  and oriented to person, place, and time. He has normal strength. GCS score is 15. GCS eye subscore is 4. GCS verbal subscore is 5. GCS motor subscore is 6.   Skin: Skin is warm and dry. Capillary refill takes less than 2 seconds.        Indurated 1xet1vu lesion with localized erythema and minimal drainage with expression.   Psychiatric: He has a normal mood and affect. His behavior is normal. Judgment and thought content normal.       ED Course   Procedures  Labs Reviewed - No data to display       Imaging Results    None          Medications - No data to display  Medical Decision Making:   History:   Old Medical Records: I decided to obtain old medical records.  Old Records Summarized: records from previous admission(s).  Initial Assessment:   Skin wounds left buttocks  Differential Diagnosis:   Abscess  Pressure ulcer  Trauma  Insect bite  Rash  Hematoma  Cyst          Attending Attestation:   Physician Attestation Statement for Resident:  As the supervising MD   Physician Attestation Statement: I have personally seen and examined this patient.   I agree with the above history.  -:   As the supervising MD I agree with the above PE.     As the supervising MD I agree with the above treatment, course, plan, and disposition.   -:     Patient was comfortably lying in bed.  No acute distress.  Family bedside.  States this wound has been present since he was discharged several days ago.  Denies any new systemic symptoms.  States he is actually feeling much better.  The wound itself is very small indurated area.  Extremely scant amount of discharge expressed.  No fluid pocket appreciated.  Patient is already on medication for MRSA coverage, Bactrim.  Will double coverage at this time with clindamycin and add topical antibiotic as well.  No evidence of significant breakdown.  No other complaints this time.  Will treat topically and systemically.  Patient already has close PCP follow-up scheduled for next week.  Strict  return precautions provided and released. (Gamaliel)                               Clinical Impression:   Final diagnoses:  [B20] HIV infection, unspecified symptom status (Primary)  [L98.411] Skin ulcer of buttock, limited to breakdown of skin        ED Disposition Condition    Discharge Stable          ED Prescriptions    None       Follow-up Information       Follow up With Specialties Details Why Contact Info    [facility] Emergency Medicine  As needed, If symptoms worsen [address] [facility] [id number] [phone]    Primary physician  Go in 1 week               Kaitlin Mata MD  Resident  01/31/23 223       Marlon Alston MD  02/01/23 0935

## 2023-02-01 NOTE — TELEPHONE ENCOUNTER
"Patient states he was in the hospital and developed a pressure ulcer to his buttocks. Patient reports that he felt it "bust" today. Patients states he noticed pus coming from wound. Mother reports wound is red and has a small blister noted to wound. Advised patient of dispo to go to the ED. Verbalized understanding. Advised to call back if symptoms become worse or with further questions.    Reason for Disposition   Black (necrotic) or blisters develop in wound    Additional Information   Negative: [1] Widespread rash AND [2] bright red, sunburn-like AND [3] too weak to stand   Negative: Sounds like a life-threatening emergency to the triager   Negative: [1] Widespread rash AND [2] bright red, sunburn-like   Negative: SEVERE pain in the wound    Protocols used: Wound Infection-A-AH    "

## 2023-02-08 ENCOUNTER — CLINICAL SUPPORT (OUTPATIENT)
Dept: INFECTIOUS DISEASES | Facility: CLINIC | Age: 28
End: 2023-02-08
Payer: MEDICAID

## 2023-02-08 DIAGNOSIS — B20 HIV INFECTION, UNSPECIFIED SYMPTOM STATUS: ICD-10-CM

## 2023-02-08 DIAGNOSIS — A53.9 SYPHILIS: ICD-10-CM

## 2023-02-08 DIAGNOSIS — B20 HIV INFECTION, UNSPECIFIED SYMPTOM STATUS: Primary | ICD-10-CM

## 2023-02-08 DIAGNOSIS — B20 SYMPTOMATIC HIV INFECTION: ICD-10-CM

## 2023-02-08 LAB
ALBUMIN SERPL-MCNC: 3.3 G/DL (ref 3.5–5)
ALBUMIN/GLOB SERPL: 0.6 RATIO (ref 1.1–2)
ALP SERPL-CCNC: 142 UNIT/L (ref 40–150)
ALT SERPL-CCNC: 50 UNIT/L (ref 0–55)
AST SERPL-CCNC: 27 UNIT/L (ref 5–34)
BILIRUBIN DIRECT+TOT PNL SERPL-MCNC: 0.3 MG/DL
BUN SERPL-MCNC: 10.2 MG/DL (ref 8.9–20.6)
C TRACH DNA SPEC QL NAA+PROBE: NOT DETECTED
CALCIUM SERPL-MCNC: 9 MG/DL (ref 8.4–10.2)
CHLORIDE SERPL-SCNC: 105 MMOL/L (ref 98–107)
CO2 SERPL-SCNC: 19 MMOL/L (ref 22–29)
CREAT SERPL-MCNC: 0.73 MG/DL (ref 0.73–1.18)
CRYPTOC AG SER QL IA.RAPID: NEGATIVE
CRYPTOC AG TITR CSF IA: NORMAL {TITER}
DEPRECATED CALCIDIOL+CALCIFEROL SERPL-MC: 14.5 NG/ML (ref 30–80)
GFR SERPLBLD CREATININE-BSD FMLA CKD-EPI: >60 MLS/MIN/1.73/M2
GLOBULIN SER-MCNC: 6 GM/DL (ref 2.4–3.5)
GLUCOSE SERPL-MCNC: 89 MG/DL (ref 74–100)
HAV AB SER QL IA: REACTIVE
HBV SURFACE AB SER-ACNC: 1.31 MIU/ML
HBV SURFACE AB SERPL IA-ACNC: NONREACTIVE M[IU]/ML
N GONORRHOEA DNA SPEC QL NAA+PROBE: NOT DETECTED
POTASSIUM SERPL-SCNC: 3.8 MMOL/L (ref 3.5–5.1)
PROT SERPL-MCNC: 9.3 GM/DL (ref 6.4–8.3)
SODIUM SERPL-SCNC: 136 MMOL/L (ref 136–145)

## 2023-02-08 PROCEDURE — 87591 N.GONORRHOEAE DNA AMP PROB: CPT

## 2023-02-08 PROCEDURE — 36415 COLL VENOUS BLD VENIPUNCTURE: CPT

## 2023-02-08 PROCEDURE — 86644 CMV ANTIBODY: CPT

## 2023-02-08 PROCEDURE — 80053 COMPREHEN METABOLIC PANEL: CPT

## 2023-02-08 PROCEDURE — 81381 HLA I TYPING 1 ALLELE HR: CPT

## 2023-02-08 PROCEDURE — 82955 ASSAY OF G6PD ENZYME: CPT

## 2023-02-08 PROCEDURE — 96372 THER/PROPH/DIAG INJ SC/IM: CPT | Mod: PBBFAC

## 2023-02-08 PROCEDURE — 99213 OFFICE O/P EST LOW 20 MIN: CPT | Mod: PBBFAC,25

## 2023-02-08 PROCEDURE — 87899 AGENT NOS ASSAY W/OPTIC: CPT

## 2023-02-08 PROCEDURE — 86706 HEP B SURFACE ANTIBODY: CPT

## 2023-02-08 PROCEDURE — 86708 HEPATITIS A ANTIBODY: CPT

## 2023-02-08 PROCEDURE — 82306 VITAMIN D 25 HYDROXY: CPT

## 2023-02-08 PROCEDURE — 86480 TB TEST CELL IMMUN MEASURE: CPT

## 2023-02-08 RX ADMIN — PENICILLIN G BENZATHINE 2.4 MILLION UNITS: 1200000 INJECTION, SUSPENSION INTRAMUSCULAR at 02:02

## 2023-02-08 NOTE — PROGRESS NOTES
B20 Intake    Patient presents to clinic for a new HIV diagnosis while he was inpatient at [facility] from 01/18/2023-01/26/2023. He stated he went to the ER in December 2022 for oral ulcers, sore throat, cough, and pain when swallowing. He presented to the ER again on 01/18/2023 for weight loss, decreased appetite, n/v, fatigue, night sweats, cough, and fevers. HIV 4th gen was completed and resulted positive. Patient states his last negative HIV test was in September/October 2022. Patient does not remember where he was tested at. Patient states he last sexual encounter was in July/August 2022. He has had about 10 lifetime partners, both male and female. Protection used for the most part. At the end of the intake, Vivi Salazar NP came in to speak with the patient. She ordered for him to have Bicillin #1 today, Bicillin #2 on 02/15/2023, and Bicillin #3 at his appointment with her on 02/23/2023. Bicillin (#1) 2.4 millionunits given IM to both buttocks without difficulty. Patient tolerated well.     *Labs drawn while inpatient: CBC, CMP, TSH, UA, 4th gen, HgA1c, UDS, CD4, syphilis, VL, hep panel, lipids, toxo, CXR  *CMP added today    HIV Test Date: 01/18/2023            Location: [facility] while inpatient    CD4: 7 (as of 01/18/2023); currently on Bactrim and azithromycin  Viral Load: 340,000 (as of 01/18/2023)    Current ARV Therapy: none at this time    Who knows of status: mom (he does not want her to know his information), aunt, and uncle    Marital Status:  SINGLE         Children: none    Risk Factors:   Blood Transfusion: yes, January 2023, at [facility]   IVDA: denies; admits to marijuana, last use a few days ago   Tattoos: yes; professionally done   Piercings: belly button, nipples (both closed up), and ears; all professionally done    Hx of STD: syphilis (RPR 1:256 on 12/19/2021, received Bicillin x 1; RPR 64 dils 01/18/2023, Bicillin #1 received on 02/08/2023, Bicillin #2 scheduled for 02/15/2023, Bicillin #3  scheduled for 02/23/2023)    Previous Opportunistic Infection:  Oral Candidiasis (states previously treated with fluconazole), 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  Stressed importance of medication adherence, keeping appointments, and using safe sex practices.  Encouraged to maintain, good health, hygiene, and nutrition.  Follow up appointment set with Vivi Salazar NP on 02/23/2023 at 10:30am.  Directed patient to [facility] to have lab work done.

## 2023-02-09 ENCOUNTER — TELEPHONE (OUTPATIENT)
Dept: INFECTIOUS DISEASES | Facility: CLINIC | Age: 28
End: 2023-02-09
Payer: MEDICAID

## 2023-02-09 DIAGNOSIS — E55.9 VITAMIN D DEFICIENCY: Primary | ICD-10-CM

## 2023-02-09 RX ORDER — ERGOCALCIFEROL 1.25 MG/1
50000 CAPSULE ORAL
Qty: 4 CAPSULE | Refills: 1 | Status: SHIPPED | OUTPATIENT
Start: 2023-02-09 | End: 2023-04-12 | Stop reason: SDUPTHER

## 2023-02-09 NOTE — PROGRESS NOTES
Reviewed labs. Vit D level is low at 14.5.  Pt will need to start Ergocalciferol 50,000 units once weekly.  Please contact patient with results and plan.

## 2023-02-09 NOTE — TELEPHONE ENCOUNTER
Per Belen Lehman's note on 02/09/2023: Reviewed labs. Vit D level is low at 14.5.  Pt will need to start Ergocalciferol 50,000 units once weekly.  Please contact patient with results and plan.      Attempted to contact patient in regards to results and Belen's recommendations. No answer. Voicemail left to call the clinic back.

## 2023-02-10 LAB
CMV IGG SERPL QL IA: POSITIVE
GAMMA INTERFERON BACKGROUND BLD IA-ACNC: 0.08 IU/ML
M TB IFN-G BLD-IMP: NEGATIVE
M TB IFN-G CD4+ BCKGRND COR BLD-ACNC: 0.01 IU/ML
M TB IFN-G CD4+CD8+ BCKGRND COR BLD-ACNC: 0.01 IU/ML
MITOGEN IGNF BCKGRD COR BLD-ACNC: 6.82 IU/ML

## 2023-02-10 NOTE — TELEPHONE ENCOUNTER
Patient returned the call. Informed of the Vitamin D results and that a rx was sent to his pharmacy per Belen Lehman NP. Patient voiced understanding.

## 2023-02-14 LAB
ADVERSE DRUG SEQ VAR INTERP BLD/T-IMP: NORMAL
GENETICIST REVIEW: NORMAL
HLA-B*57:01 QL: NEGATIVE
LAB TEST METHOD: NORMAL
PROVIDER SIGNING NAME: NORMAL
TEST PERFORMANCE INFO SPEC: NORMAL

## 2023-02-15 ENCOUNTER — CLINICAL SUPPORT (OUTPATIENT)
Dept: INFECTIOUS DISEASES | Facility: CLINIC | Age: 28
End: 2023-02-15
Payer: MEDICAID

## 2023-02-15 ENCOUNTER — TELEPHONE (OUTPATIENT)
Dept: INFECTIOUS DISEASES | Facility: CLINIC | Age: 28
End: 2023-02-15

## 2023-02-15 DIAGNOSIS — A53.9 SYPHILIS: Primary | ICD-10-CM

## 2023-02-15 PROCEDURE — 99211 OFF/OP EST MAY X REQ PHY/QHP: CPT | Mod: PBBFAC,25

## 2023-02-15 PROCEDURE — 96372 THER/PROPH/DIAG INJ SC/IM: CPT | Mod: PBBFAC

## 2023-02-15 RX ORDER — SULFAMETHOXAZOLE AND TRIMETHOPRIM 800; 160 MG/1; MG/1
2 TABLET ORAL 3 TIMES DAILY
COMMUNITY
End: 2023-02-15 | Stop reason: SDUPTHER

## 2023-02-15 RX ORDER — SULFAMETHOXAZOLE AND TRIMETHOPRIM 800; 160 MG/1; MG/1
2 TABLET ORAL 3 TIMES DAILY
Qty: 30 TABLET | Refills: 0 | Status: SHIPPED | OUTPATIENT
Start: 2023-02-15 | End: 2023-02-23

## 2023-02-15 RX ADMIN — PENICILLIN G BENZATHINE 2.4 MILLION UNITS: 1200000 INJECTION, SUSPENSION INTRAMUSCULAR at 12:02

## 2023-02-15 NOTE — PROGRESS NOTES
Patient came in for second scheduled Bicillin injection as ordered by Vivi Salazar NP. Bicillin 2.4 millionunits given IM to both buttocks without difficulty. Patient tolerated well. States completed Bactrim Saturday. Per Apolonia Herrera RN, Patient coming in on 02/15/2023 for Bicillin #2. Ask patient how much Bactrim he has left. He has an appt with Vivi on 02/23/2023 and she does not want him to run out. If he does not have enough to get him to 02/23/2023, then it will need to be refilled.

## 2023-02-15 NOTE — TELEPHONE ENCOUNTER
Patient coming in on 02/15/2023 for Bicillin #2. Ask patient how much Bactrim he has left. He has an appt with Vivi on 02/23/2023 and she does not want him to run out. If he does not have enough to get him to 02/23/2023, then it will need to be refilled.     Patient came in for second Bicillin injection. States out of Bactrim since Saturday 02/11/2023. Refill proposed to Belen Lehman NP.

## 2023-02-15 NOTE — TELEPHONE ENCOUNTER
----- Message from Carlos Raygoza LPN sent at 2/15/2023  1:22 PM CST -----  Regarding: FW: Bactrim  Bicillin given per heladio Juarez with pt response  ----- Message -----  From: Apolonia Herrera RN  Sent: 2/14/2023  12:00 AM CST  To: Carlos Raygoza LPN  Subject: Bactrim                                          Patient coming in on 02/15/2023 for Bicillin #2. Ask patient how much Bactrim he has left. He has an appt with Vivi on 02/23/2023 and she does not want him to run out. If he does not have enough to get him to 02/23/2023, then it will need to be refilled.

## 2023-02-15 NOTE — TELEPHONE ENCOUNTER
Reviewed chart.  Bactrim DS 2 tabs TID x 14 days was started on 1/26/23. He has not had any medication since Saturday, 2/11/23.  Pt is requesting a refill. He is 5 days short. I will send a refill. Please review to make sure this is correct.

## 2023-02-16 LAB — G6PD RBC-CCNT: 9.3 U/G HB (ref 8–11.9)

## 2023-02-16 NOTE — TELEPHONE ENCOUNTER
Phoned patient. No answer. Message left on voice mail to contact clinic regarding Bactrim dosing per day.

## 2023-02-16 NOTE — TELEPHONE ENCOUNTER
He started presumptive PJP treatment while inpatient on 1/18/23, so the additional 14 days at discharge completed his 21 day treatment course for PJP.  He now needs to be on secondary prophylaxis with Bactrim DS 1 tablet daily until CD4 is restored.  Please call pt and let him know that he can decrease the Bactrim that he has to just one per day.  Thank you.

## 2023-02-23 ENCOUNTER — OFFICE VISIT (OUTPATIENT)
Dept: INFECTIOUS DISEASES | Facility: CLINIC | Age: 28
End: 2023-02-23
Payer: MEDICAID

## 2023-02-23 VITALS
WEIGHT: 123 LBS | SYSTOLIC BLOOD PRESSURE: 119 MMHG | HEIGHT: 69 IN | HEART RATE: 132 BPM | DIASTOLIC BLOOD PRESSURE: 84 MMHG | BODY MASS INDEX: 18.22 KG/M2 | TEMPERATURE: 99 F | RESPIRATION RATE: 16 BRPM

## 2023-02-23 DIAGNOSIS — Z86.19 HISTORY OF PNEUMOCYSTIS JIROVECII PNEUMONIA: ICD-10-CM

## 2023-02-23 DIAGNOSIS — B20 HIV DISEASE: Primary | ICD-10-CM

## 2023-02-23 DIAGNOSIS — Z11.3 ROUTINE SCREENING FOR STI (SEXUALLY TRANSMITTED INFECTION): ICD-10-CM

## 2023-02-23 DIAGNOSIS — A53.9 SYPHILIS (ACQUIRED): ICD-10-CM

## 2023-02-23 DIAGNOSIS — E55.9 VITAMIN D DEFICIENCY: ICD-10-CM

## 2023-02-23 DIAGNOSIS — K61.0 ABSCESS, PERIANAL: ICD-10-CM

## 2023-02-23 LAB
C TRACH DNA SPEC QL NAA+PROBE: NOT DETECTED
N GONORRHOEA DNA SPEC QL NAA+PROBE: NOT DETECTED

## 2023-02-23 PROCEDURE — 1160F RVW MEDS BY RX/DR IN RCRD: CPT | Mod: CPTII,,, | Performed by: NURSE PRACTITIONER

## 2023-02-23 PROCEDURE — 3074F SYST BP LT 130 MM HG: CPT | Mod: CPTII,,, | Performed by: NURSE PRACTITIONER

## 2023-02-23 PROCEDURE — 1159F PR MEDICATION LIST DOCUMENTED IN MEDICAL RECORD: ICD-10-PCS | Mod: CPTII,,, | Performed by: NURSE PRACTITIONER

## 2023-02-23 PROCEDURE — 96372 THER/PROPH/DIAG INJ SC/IM: CPT | Mod: PBBFAC

## 2023-02-23 PROCEDURE — 3074F PR MOST RECENT SYSTOLIC BLOOD PRESSURE < 130 MM HG: ICD-10-PCS | Mod: CPTII,,, | Performed by: NURSE PRACTITIONER

## 2023-02-23 PROCEDURE — 1160F PR REVIEW ALL MEDS BY PRESCRIBER/CLIN PHARMACIST DOCUMENTED: ICD-10-PCS | Mod: CPTII,,, | Performed by: NURSE PRACTITIONER

## 2023-02-23 PROCEDURE — 99205 OFFICE O/P NEW HI 60 MIN: CPT | Mod: S$PBB,,, | Performed by: NURSE PRACTITIONER

## 2023-02-23 PROCEDURE — 1159F MED LIST DOCD IN RCRD: CPT | Mod: CPTII,,, | Performed by: NURSE PRACTITIONER

## 2023-02-23 PROCEDURE — 99417 PROLNG OP E/M EACH 15 MIN: CPT | Mod: S$PBB,,, | Performed by: NURSE PRACTITIONER

## 2023-02-23 PROCEDURE — 3008F BODY MASS INDEX DOCD: CPT | Mod: CPTII,,, | Performed by: NURSE PRACTITIONER

## 2023-02-23 PROCEDURE — 99205 PR OFFICE/OUTPT VISIT, NEW, LEVL V, 60-74 MIN: ICD-10-PCS | Mod: S$PBB,,, | Performed by: NURSE PRACTITIONER

## 2023-02-23 PROCEDURE — 3008F PR BODY MASS INDEX (BMI) DOCUMENTED: ICD-10-PCS | Mod: CPTII,,, | Performed by: NURSE PRACTITIONER

## 2023-02-23 PROCEDURE — 99214 OFFICE O/P EST MOD 30 MIN: CPT | Mod: PBBFAC | Performed by: NURSE PRACTITIONER

## 2023-02-23 PROCEDURE — 99417 PR PROLONGED SVC, OUTPT, W/WO DIRECT PT CONTACT,  EA ADDTL 15 MIN: ICD-10-PCS | Mod: S$PBB,,, | Performed by: NURSE PRACTITIONER

## 2023-02-23 PROCEDURE — 87591 N.GONORRHOEAE DNA AMP PROB: CPT | Performed by: NURSE PRACTITIONER

## 2023-02-23 PROCEDURE — 3079F DIAST BP 80-89 MM HG: CPT | Mod: CPTII,,, | Performed by: NURSE PRACTITIONER

## 2023-02-23 PROCEDURE — 3079F PR MOST RECENT DIASTOLIC BLOOD PRESSURE 80-89 MM HG: ICD-10-PCS | Mod: CPTII,,, | Performed by: NURSE PRACTITIONER

## 2023-02-23 RX ORDER — BICTEGRAVIR SODIUM, EMTRICITABINE, AND TENOFOVIR ALAFENAMIDE FUMARATE 50; 200; 25 MG/1; MG/1; MG/1
1 TABLET ORAL DAILY
Qty: 30 TABLET | Refills: 3 | Status: SHIPPED | OUTPATIENT
Start: 2023-02-23 | End: 2023-04-12 | Stop reason: SDUPTHER

## 2023-02-23 RX ORDER — SULFAMETHOXAZOLE AND TRIMETHOPRIM 800; 160 MG/1; MG/1
1 TABLET ORAL DAILY
Qty: 30 TABLET | Refills: 3 | Status: SHIPPED | OUTPATIENT
Start: 2023-02-23 | End: 2023-04-12 | Stop reason: SDUPTHER

## 2023-02-23 RX ORDER — DOXYCYCLINE HYCLATE 100 MG
100 TABLET ORAL 2 TIMES DAILY
Qty: 28 TABLET | Refills: 0 | Status: SHIPPED | OUTPATIENT
Start: 2023-02-23 | End: 2023-03-09

## 2023-02-23 RX ORDER — AZITHROMYCIN 600 MG/1
1200 TABLET, FILM COATED ORAL WEEKLY
Qty: 10 TABLET | Refills: 0 | Status: SHIPPED | OUTPATIENT
Start: 2023-02-23 | End: 2023-03-09

## 2023-02-23 RX ADMIN — PENICILLIN G BENZATHINE 2.4 MILLION UNITS: 1200000 INJECTION, SUSPENSION INTRAMUSCULAR at 12:02

## 2023-02-23 NOTE — PROGRESS NOTES
"Subjective:       Patient ID: Hanh Hassan is a 27 y.o. male.    Chief Complaint: New HIV (States "abcess" or boil near rectum, left side. States started last night)    2/23/23  Hanh is a 26 yo AAM here today for initial HIV evaluation, diagnosed 1/23 while admitted inpatient at [facility].  He is ART naive.  MSM, anal receptive. Has engaged in sexual relations with females in the past as well, approximately 10 lifetime partners.  First sexual encounter approximately 2010. STI history of syphilis 12/21 with a titer of 256 dils.  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 Denies any changes in vision, headaches, hearing loss.  Repeat titer on 1/23 with titer of 64 dils, treatment initiated with Bicillin 2.4 mil units IM weekly x 3 and is taking final dose today. Denies any sexual encounters since time of HIV diagnosis. States that he had a negative HIV test as recent as summer/fall 2022 but does not recall where this testing was performed. He was treated while inpatient for oropharyngeal candidiasis with fluconazole. States completed doses and thrush has not recurred. He is taking Azithromycin weekly as prescribed, states that he has been taking 2 tabs on Tuesdays but it does cause him some GI upset. Will try to separate the doses to see if that will make it more tolerable. He completed treatment with Bactrim DS 2 tabs TID for PJP diagnosed while IP as well.  He has converted to 1 tab daily for secondary prophylaxis.  Cryptococcal ag negative.  Denies any shortness of breath, dyspnea on exertion.  His heart rate is elevated today with low grade temperature.  He tells me that he started with what feels like an abscess to his rectal area last night. No drainage, no blistering. He is ready to start ART and agrees to adhere to treatment plan.  Extensive education provided and all questions answered.         Review of Systems   Constitutional:  Positive for chills, fatigue and " unexpected weight change.   HENT: Negative.     Respiratory: Negative.  Negative for cough, shortness of breath and stridor.    Cardiovascular: Negative.    Gastrointestinal: Negative.    Genitourinary: Negative.    Integumentary:  Negative.   Neurological: Negative.    Hematological:  Positive for adenopathy.   Psychiatric/Behavioral: Negative.         Objective:      Physical Exam  Vitals reviewed.   Constitutional:       General: He is not in acute distress.     Appearance: Normal appearance. He is underweight. He is not toxic-appearing.   HENT:      Mouth/Throat:      Mouth: Mucous membranes are moist.      Pharynx: Oropharynx is clear.      Comments: No thrush.   Eyes:      Conjunctiva/sclera: Conjunctivae normal.   Cardiovascular:      Rate and Rhythm: Regular rhythm. Tachycardia present.   Pulmonary:      Effort: Pulmonary effort is normal. No respiratory distress.      Breath sounds: Normal breath sounds.   Abdominal:      General: Abdomen is flat. Bowel sounds are normal.      Palpations: Abdomen is soft.   Genitourinary:     Rectum: Tenderness present.       Musculoskeletal:         General: Normal range of motion.      Cervical back: Normal range of motion.   Lymphadenopathy:      Cervical: Cervical adenopathy present.   Skin:     General: Skin is warm and dry.   Neurological:      General: No focal deficit present.      Mental Status: He is alert and oriented to person, place, and time. Mental status is at baseline.   Psychiatric:         Mood and Affect: Mood normal.         Behavior: Behavior normal.       Assessment:       Problem List Items Addressed This Visit    None  Visit Diagnoses       HIV disease    -  Primary    Relevant Medications    azithromycin (ZITHROMAX) 600 MG Tab    lzxjyzcqm-fhagohhd-zmtprxo ala (BIKTARVY) -25 mg (25 kg or greater)    History of Pneumocystis jirovecii pneumonia        Relevant Medications    sulfamethoxazole-trimethoprim 800-160mg (BACTRIM DS) 800-160 mg Tab     Syphilis (acquired)        Vitamin D deficiency        Abscess, perianal        Relevant Medications    doxycycline (VIBRA-TABS) 100 MG tablet    Other Relevant Orders    Ambulatory referral/consult to General Surgery    Routine screening for STI (sexually transmitted infection)        Relevant Orders    Chlamydia/GC, PCR              Plan:           HIV disease  -     azithromycin (ZITHROMAX) 600 MG Tab; Take 2 tablets (1,200 mg total) by mouth once a week.  Dispense: 10 tablet; Refill: 0  -     gxcrzwfse-owxspfbd-mhtnosd ala (BIKTARVY) -25 mg (25 kg or greater); Take 1 tablet by mouth once daily.  Dispense: 30 tablet; Refill: 3  Extensive education provided regarding adherence, sexual health, medication management, attending scheduled visits, risks and benefits of medication.  Use condoms for all sexual encounters.  Consider complete abstinence until virally suppressed.  Notify sexual partner(s) of disease status.   Uncontrolled HIV can cause not only a weakened immune system & leave one susceptible to opportunistic infections, but can also lead to renal, cardiac, neurological, cardiovascular, and hepatic dysfunction as a result of chronic inflammation.  Take all medications as prescribed and keep follow-up with providers as scheduled.   Incorrect use of HIV medications can lead to developing resistance and loss of control of virus.  This can also limit future treatment options.   Notify our office for any concerns that may emerge, particularly if you are having trouble with obtaining medication or changes in insurance status so that we may assist you.                                   Dx 1/23  CD4 porfirio 8, VL zenith [id number]  Biktarvy 1 po daily as prescribed.  RTC 2 weeks with Vivi.    HIV Wellness:  Anal pap: N/A  Oral CT/GC: unable to collect s/t gag reflex 2/23  Anal CT/GC: 2/23  Urine CT/GC: 1/23 Neg  RPR: 12/21 256 dils, 1/23 64 dils.  Ophth:     History of Pneumocystis jirovecii pneumonia  -      sulfamethoxazole-trimethoprim 800-160mg (BACTRIM DS) 800-160 mg Tab; Take 1 tablet by mouth once daily.  Dispense: 30 tablet; Refill: 3  Completed 21 days of Bactrim DS 2 tabs tid.   Continue secondary prophylaxis with Bactrim DS 1 po daily.     Syphilis (acquired)  12/2021 Titer 256, treated with Bicillin 2.4 mil units for secondary syphilis.  1/2023 Titer 64 dils. No s/s of neurosyphilis identified.  Bicillin 2.4 mil units IM dose #3 today.     Vitamin D deficiency  Ergocalciferol 51464 units weekly as prescribed.    Abscess, perianal  -     doxycycline (VIBRA-TABS) 100 MG tablet; Take 1 tablet (100 mg total) by mouth 2 (two) times daily. for 14 days  Dispense: 28 tablet; Refill: 0  -     Ambulatory referral/consult to General Surgery; Future; Expected date: 03/02/2023  Refer to surgery for evaluation and treatment.  Sitz baths, warm compresses.  Doxycycline 100 mg po bid x 14 days.     Routine screening for STI (sexually transmitted infection)  -     Chlamydia/GC, PCR  Anal swab collected for ct/gc today.          I spent a total of 89 minutes on the day of the visit.  This includes face to face time and non-face to face time preparing to see the patient (eg, review of tests), obtaining and/or reviewing separately obtained history, documenting clinical information in the electronic or other health record, independently interpreting results and communicating results to the patient/family/caregiver, or care coordinator.

## 2023-03-09 ENCOUNTER — OFFICE VISIT (OUTPATIENT)
Dept: INFECTIOUS DISEASES | Facility: CLINIC | Age: 28
End: 2023-03-09
Payer: MEDICAID

## 2023-03-09 VITALS
BODY MASS INDEX: 18.49 KG/M2 | HEIGHT: 69 IN | SYSTOLIC BLOOD PRESSURE: 127 MMHG | DIASTOLIC BLOOD PRESSURE: 83 MMHG | TEMPERATURE: 98 F | RESPIRATION RATE: 16 BRPM | HEART RATE: 98 BPM | WEIGHT: 124.88 LBS

## 2023-03-09 DIAGNOSIS — Z86.19 HISTORY OF PNEUMOCYSTIS JIROVECII PNEUMONIA: ICD-10-CM

## 2023-03-09 DIAGNOSIS — Z86.19 HISTORY OF SYPHILIS: ICD-10-CM

## 2023-03-09 DIAGNOSIS — B20 HIV DISEASE: Primary | ICD-10-CM

## 2023-03-09 DIAGNOSIS — R63.0 ANOREXIA: ICD-10-CM

## 2023-03-09 LAB
ALBUMIN SERPL-MCNC: 4.1 G/DL (ref 3.5–5)
ALBUMIN/GLOB SERPL: 0.7 RATIO (ref 1.1–2)
ALP SERPL-CCNC: 105 UNIT/L (ref 40–150)
ALT SERPL-CCNC: 17 UNIT/L (ref 0–55)
ANISOCYTOSIS BLD QL SMEAR: ABNORMAL
AST SERPL-CCNC: 17 UNIT/L (ref 5–34)
BASOPHILS # BLD AUTO: 0.03 X10(3)/MCL (ref 0–0.2)
BASOPHILS NFR BLD AUTO: 0.7 %
BILIRUBIN DIRECT+TOT PNL SERPL-MCNC: 0.4 MG/DL
BUN SERPL-MCNC: 5.6 MG/DL (ref 8.9–20.6)
CALCIUM SERPL-MCNC: 10.5 MG/DL (ref 8.4–10.2)
CHLORIDE SERPL-SCNC: 105 MMOL/L (ref 98–107)
CO2 SERPL-SCNC: 24 MMOL/L (ref 22–29)
CREAT SERPL-MCNC: 0.97 MG/DL (ref 0.73–1.18)
EOSINOPHIL # BLD AUTO: 0.1 X10(3)/MCL (ref 0–0.9)
EOSINOPHIL NFR BLD AUTO: 2.2 %
ERYTHROCYTE [DISTWIDTH] IN BLOOD BY AUTOMATED COUNT: 22.5 % (ref 11.5–17)
GFR SERPLBLD CREATININE-BSD FMLA CKD-EPI: >60 MLS/MIN/1.73/M2
GLOBULIN SER-MCNC: 5.9 GM/DL (ref 2.4–3.5)
GLUCOSE SERPL-MCNC: 82 MG/DL (ref 74–100)
HCT VFR BLD AUTO: 37 % (ref 42–52)
HGB BLD-MCNC: 12.2 G/DL (ref 14–18)
IMM GRANULOCYTES # BLD AUTO: 0.01 X10(3)/MCL (ref 0–0.04)
IMM GRANULOCYTES NFR BLD AUTO: 0.2 %
LYMPHOCYTES # BLD AUTO: 1.97 X10(3)/MCL (ref 0.6–4.6)
LYMPHOCYTES NFR BLD AUTO: 43.6 %
MCH RBC QN AUTO: 32.3 PG
MCHC RBC AUTO-ENTMCNC: 33 G/DL (ref 33–36)
MCV RBC AUTO: 97.9 FL (ref 80–94)
MICROCYTES BLD QL SMEAR: ABNORMAL
MONOCYTES # BLD AUTO: 0.53 X10(3)/MCL (ref 0.1–1.3)
MONOCYTES NFR BLD AUTO: 11.7 %
NEUTROPHILS # BLD AUTO: 1.88 X10(3)/MCL (ref 2.1–9.2)
NEUTROPHILS NFR BLD AUTO: 41.6 %
NRBC BLD AUTO-RTO: 0 %
PLATELET # BLD AUTO: 427 X10(3)/MCL (ref 130–400)
PLATELET # BLD EST: ABNORMAL 10*3/UL
PMV BLD AUTO: 9.5 FL (ref 7.4–10.4)
POLYCHROMASIA BLD QL SMEAR: SLIGHT
POTASSIUM SERPL-SCNC: 3.5 MMOL/L (ref 3.5–5.1)
PROT SERPL-MCNC: 10 GM/DL (ref 6.4–8.3)
RBC # BLD AUTO: 3.78 X10(6)/MCL (ref 4.7–6.1)
RBC MORPH BLD: ABNORMAL
SODIUM SERPL-SCNC: 141 MMOL/L (ref 136–145)
WBC # SPEC AUTO: 4.5 X10(3)/MCL (ref 4.5–11.5)

## 2023-03-09 PROCEDURE — 3079F DIAST BP 80-89 MM HG: CPT | Mod: CPTII,,, | Performed by: NURSE PRACTITIONER

## 2023-03-09 PROCEDURE — 99214 OFFICE O/P EST MOD 30 MIN: CPT | Mod: PBBFAC | Performed by: NURSE PRACTITIONER

## 2023-03-09 PROCEDURE — 3008F BODY MASS INDEX DOCD: CPT | Mod: CPTII,,, | Performed by: NURSE PRACTITIONER

## 2023-03-09 PROCEDURE — 86360 T CELL ABSOLUTE COUNT/RATIO: CPT | Mod: 90 | Performed by: NURSE PRACTITIONER

## 2023-03-09 PROCEDURE — 85025 COMPLETE CBC W/AUTO DIFF WBC: CPT | Performed by: NURSE PRACTITIONER

## 2023-03-09 PROCEDURE — 1159F MED LIST DOCD IN RCRD: CPT | Mod: CPTII,,, | Performed by: NURSE PRACTITIONER

## 2023-03-09 PROCEDURE — 80053 COMPREHEN METABOLIC PANEL: CPT | Performed by: NURSE PRACTITIONER

## 2023-03-09 PROCEDURE — 36415 COLL VENOUS BLD VENIPUNCTURE: CPT | Performed by: NURSE PRACTITIONER

## 2023-03-09 PROCEDURE — 3074F SYST BP LT 130 MM HG: CPT | Mod: CPTII,,, | Performed by: NURSE PRACTITIONER

## 2023-03-09 PROCEDURE — 1160F PR REVIEW ALL MEDS BY PRESCRIBER/CLIN PHARMACIST DOCUMENTED: ICD-10-PCS | Mod: CPTII,,, | Performed by: NURSE PRACTITIONER

## 2023-03-09 PROCEDURE — 1160F RVW MEDS BY RX/DR IN RCRD: CPT | Mod: CPTII,,, | Performed by: NURSE PRACTITIONER

## 2023-03-09 PROCEDURE — 87536 HIV-1 QUANT&REVRSE TRNSCRPJ: CPT | Mod: 90 | Performed by: NURSE PRACTITIONER

## 2023-03-09 PROCEDURE — 1159F PR MEDICATION LIST DOCUMENTED IN MEDICAL RECORD: ICD-10-PCS | Mod: CPTII,,, | Performed by: NURSE PRACTITIONER

## 2023-03-09 PROCEDURE — 86359 T CELLS TOTAL COUNT: CPT | Mod: 90 | Performed by: NURSE PRACTITIONER

## 2023-03-09 PROCEDURE — 99215 PR OFFICE/OUTPT VISIT, EST, LEVL V, 40-54 MIN: ICD-10-PCS | Mod: S$PBB,,, | Performed by: NURSE PRACTITIONER

## 2023-03-09 PROCEDURE — 3074F PR MOST RECENT SYSTOLIC BLOOD PRESSURE < 130 MM HG: ICD-10-PCS | Mod: CPTII,,, | Performed by: NURSE PRACTITIONER

## 2023-03-09 PROCEDURE — 99215 OFFICE O/P EST HI 40 MIN: CPT | Mod: S$PBB,,, | Performed by: NURSE PRACTITIONER

## 2023-03-09 PROCEDURE — 3079F PR MOST RECENT DIASTOLIC BLOOD PRESSURE 80-89 MM HG: ICD-10-PCS | Mod: CPTII,,, | Performed by: NURSE PRACTITIONER

## 2023-03-09 PROCEDURE — 3008F PR BODY MASS INDEX (BMI) DOCUMENTED: ICD-10-PCS | Mod: CPTII,,, | Performed by: NURSE PRACTITIONER

## 2023-03-09 RX ORDER — CETIRIZINE HYDROCHLORIDE 10 MG/1
10 TABLET ORAL DAILY
Qty: 90 TABLET | Refills: 1 | Status: SHIPPED | OUTPATIENT
Start: 2023-03-09 | End: 2023-04-12 | Stop reason: SDUPTHER

## 2023-03-09 RX ORDER — MEGESTROL ACETATE 40 MG/ML
400 SUSPENSION ORAL DAILY
Qty: 300 ML | Refills: 0 | Status: SHIPPED | OUTPATIENT
Start: 2023-03-09 | End: 2023-04-08

## 2023-03-09 NOTE — PROGRESS NOTES
Subjective:       Patient ID: Hanh Hassan is a 27 y.o. male.    Chief Complaint: Followup HIV    3/9/23  Hanh is a 28 yo AAM here today for HIV f/u visit.  He has been taking Biktarvy x 2 weeks now & is tolerating it well.  He tells me that he stopped taking Bactrim in the last week or so.  He states that he started to develop itching with Bactrim after the dose was lowered.  I advised pt that itching may be a result of a different factor and not related to Bactrim at all as he tolerated high dose Bactrim for PJP treatment x 3 weeks.  Will resume Bactrim DS daily for secondary prophylaxis with Zyrtec daily to minimize risk of PJP recurrence.  He voiced understanding & agreement.  He tells me that anal abscess ruptured 2 days after last visit & pain has resolved.  He has only a couple of doses of doxycycline to complete treatment as prescribed. He is taking vitamin d weekly, tolerates well. He has gained 20 # since hospitalization, appetite improved with Megace. Will continue for one more month. All questions answered & concerns addressed.    2/23/23  Hanh is a 28 yo AAM here today for initial HIV evaluation, diagnosed 1/23 while admitted inpatient at [facility].  He is ART naive.  MSM, anal receptive. Has engaged in sexual relations with females in the past as well, approximately 10 lifetime partners.  First sexual encounter approximately 2010. STI history of syphilis 12/21 with a titer of 256 dils.  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 Denies any changes in vision, headaches, hearing loss.  Repeat titer on 1/23 with titer of 64 dils, treatment initiated with Bicillin 2.4 mil units IM weekly x 3 and is taking final dose today. Denies any sexual encounters since time of HIV diagnosis. States that he had a negative HIV test as recent as summer/fall 2022 but does not recall where this testing was performed. He was treated while inpatient for oropharyngeal candidiasis  with fluconazole. States completed doses and thrush has not recurred. He is taking Azithromycin weekly as prescribed, states that he has been taking 2 tabs on Tuesdays but it does cause him some GI upset. Will try to separate the doses to see if that will make it more tolerable. He completed treatment with Bactrim DS 2 tabs TID for PJP diagnosed while IP as well.  He has converted to 1 tab daily for secondary prophylaxis.  Cryptococcal ag negative.  Denies any shortness of breath, dyspnea on exertion.  His heart rate is elevated today with low grade temperature.  He tells me that he started with what feels like an abscess to his rectal area last night. No drainage, no blistering. He is ready to start ART and agrees to adhere to treatment plan.  Extensive education provided and all questions answered.     Review of Systems   Constitutional: Negative.    HENT: Negative.     Respiratory: Negative.     Cardiovascular: Negative.    Gastrointestinal: Negative.    Genitourinary: Negative.    Integumentary:  Negative.   Neurological: Negative.    Hematological: Negative.    Psychiatric/Behavioral: Negative.         Objective:      Physical Exam  Vitals reviewed.   Constitutional:       General: He is not in acute distress.     Appearance: Normal appearance. He is not toxic-appearing.   HENT:      Mouth/Throat:      Mouth: Mucous membranes are moist.      Pharynx: Oropharynx is clear.   Eyes:      Conjunctiva/sclera: Conjunctivae normal.   Cardiovascular:      Rate and Rhythm: Normal rate and regular rhythm.   Pulmonary:      Effort: Pulmonary effort is normal. No respiratory distress.      Breath sounds: Normal breath sounds.   Abdominal:      General: Abdomen is flat. Bowel sounds are normal.      Palpations: Abdomen is soft.   Musculoskeletal:         General: Normal range of motion.      Cervical back: Normal range of motion.   Lymphadenopathy:      Cervical: No cervical adenopathy.   Skin:     General: Skin is warm and  dry.   Neurological:      General: No focal deficit present.      Mental Status: He is alert and oriented to person, place, and time. Mental status is at baseline.   Psychiatric:         Mood and Affect: Mood normal.         Behavior: Behavior normal.       Assessment:       Problem List Items Addressed This Visit    None  Visit Diagnoses       HIV disease    -  Primary              Plan:           HIV disease  -     CBC Auto Differential; Future; Expected date: 03/09/2023  -     Comprehensive Metabolic Panel  -     HIV RNA, Quantitative, PCR; Future; Expected date: 03/09/2023  -     CD4 Lymphocytes; Future; Expected date: 03/09/2023  -     CMV DNA, Quantitative, PCR; Future; Expected date: 03/09/2023  -     Ambulatory referral/consult to Ophthalmology; Future; Expected date: 03/16/2023  -     cetirizine (ZYRTEC) 10 MG tablet; Take 1 tablet (10 mg total) by mouth once daily.  Dispense: 90 tablet; Refill: 1  -     Blood Smear Microscopic Exam  Dx 1/23  CD4 porfirio 8, VL zenith 540229  Biktarvy 1 po daily as prescribed.  RTC 4 weeks with Vivi.     HIV Wellness:  Anal pap: N/A  Oral CT/GC: unable to collect s/t gag reflex 2/23  Anal CT/GC: 2/23 Neg  Urine CT/GC: 1/23 Neg  RPR: 12/21 256 dils, 1/23 64 dils.  Ophth: referred 3/9/23    History of Pneumocystis jirovecii pneumonia  Completed 21 days of Bactrim DS 2 tabs tid.   Continue secondary prophylaxis with Bactrim DS 1 po daily.    Anorexia  -     megestroL (MEGACE) 400 mg/10 mL (10 mL) Susp; Take 10 mLs (400 mg total) by mouth once daily.  Dispense: 300 mL; Refill: 0  Improved.   Continue Megace daily x 1 month.      Syphilis (acquired)  12/2021 Titer 256, treated with Bicillin 2.4 mil units for secondary syphilis.  1/2023 Titer 64 dils. No s/s of neurosyphilis identified.  Bicillin 2.4 mil units IM dose #3 today.      Vitamin D deficiency  Ergocalciferol 88245 units weekly as prescribed.             I spent a total of 46 minutes on the day of the visit.  This  includes face to face time and non-face to face time preparing to see the patient (eg, review of tests), obtaining and/or reviewing separately obtained history, documenting clinical information in the electronic or other health record, independently interpreting results and communicating results to the patient/family/caregiver, or care coordinator.

## 2023-03-13 ENCOUNTER — TELEPHONE (OUTPATIENT)
Dept: INFECTIOUS DISEASES | Facility: CLINIC | Age: 28
End: 2023-03-13
Payer: MEDICAID

## 2023-03-13 LAB
CD3 CELLS # BLD: 1203 CELLS/MCL (ref 550–2202)
CD3 CELLS NFR BLD: 89 % (ref 58–86)
CD3+CD4+ CELLS # BLD: 54 CELLS/MCL (ref 365–1437)
CD3+CD4+ CELLS NFR BLD: 4 % (ref 32–64)
CD3+CD4+ CELLS/CD3+CD8+ CLL BLD: 0.1 %
CD3+CD8+ CELLS # BLD: 1002 CELLS/MCL (ref 199–846)
CD3+CD8+ CELLS NFR BLD: 74 % (ref 18–40)
CD45 CELLS # BLD: 1.36 THOU/MCL (ref 0.82–2.84)
CMV DNA SERPL NAA+PROBE-ACNC: 344 IU/ML
FLOW CYTOMETRY SPECIALIST REVIEW: ABNORMAL

## 2023-03-13 NOTE — TELEPHONE ENCOUNTER
----- Message from MAGALIE Cuevas sent at 3/10/2023  9:18 AM CST -----  Regarding: elevated calcium  Calcium level increased significantly in 2 week.  Please see if lab can rerun the calcium if they still have sample. I don't want to run any additional labs on this patient if not necessary, nor do I want him to have to return for repeat collection.  Thank you.   ----- Message -----  From: Background User Lab  Sent: 3/9/2023   3:20 PM CST  To: MAGALIE Cuevas

## 2023-03-13 NOTE — TELEPHONE ENCOUNTER
I called lab ans spoke with Alicia to see if specimen from 3/9/2023 is still in lab to repeat calcium.   Specimen is still in lab (green tube) however it does not have the serum separator so specimen can no longer be used. Pt will need to come in for recollection

## 2023-03-14 ENCOUNTER — TELEPHONE (OUTPATIENT)
Dept: INFECTIOUS DISEASES | Facility: CLINIC | Age: 28
End: 2023-03-14
Payer: MEDICAID

## 2023-03-14 LAB — HIV1 RNA # PLAS NAA DL=20: 1330 COPIES/ML

## 2023-03-14 NOTE — TELEPHONE ENCOUNTER
CMV  with severe immunosuppression.  I placed a referral to oph for evaluation to assess for CMV retinitis, asymptomatic, on 3/9/23.  I do not see where he was appointed as of yet.  Can you please call ophth clinic to get him an appointment within 1-2 weeks?      Thank you.

## 2023-03-14 NOTE — TELEPHONE ENCOUNTER
[facility] Ophthalmology clinic phoned. Person answering the phoned states with a patient and will call back. Name and number given.

## 2023-03-15 NOTE — TELEPHONE ENCOUNTER
Phoned Ophthalmology clinic. Per Mer, next available apt scheduled for 06/19/2023 at 1:00p.m. Patient notified of date and time of apt.

## 2023-04-12 ENCOUNTER — OFFICE VISIT (OUTPATIENT)
Dept: INFECTIOUS DISEASES | Facility: CLINIC | Age: 28
End: 2023-04-12
Payer: MEDICAID

## 2023-04-12 VITALS
WEIGHT: 133 LBS | BODY MASS INDEX: 19.7 KG/M2 | DIASTOLIC BLOOD PRESSURE: 83 MMHG | HEIGHT: 69 IN | TEMPERATURE: 99 F | HEART RATE: 98 BPM | RESPIRATION RATE: 16 BRPM | SYSTOLIC BLOOD PRESSURE: 131 MMHG

## 2023-04-12 DIAGNOSIS — Z86.19 HISTORY OF PNEUMOCYSTIS JIROVECII PNEUMONIA: ICD-10-CM

## 2023-04-12 DIAGNOSIS — K12.30: ICD-10-CM

## 2023-04-12 DIAGNOSIS — E55.9 VITAMIN D DEFICIENCY: ICD-10-CM

## 2023-04-12 DIAGNOSIS — B20 HIV DISEASE: Primary | ICD-10-CM

## 2023-04-12 LAB
ABS NEUT CALC (OHS): 0.57 X10(3)/MCL (ref 2.1–9.2)
ALBUMIN SERPL-MCNC: 3.9 G/DL (ref 3.5–5)
ALBUMIN/GLOB SERPL: 0.6 RATIO (ref 1.1–2)
ALP SERPL-CCNC: 71 UNIT/L (ref 40–150)
ALT SERPL-CCNC: 13 UNIT/L (ref 0–55)
ANISOCYTOSIS BLD QL SMEAR: SLIGHT
AST SERPL-CCNC: 21 UNIT/L (ref 5–34)
BILIRUBIN DIRECT+TOT PNL SERPL-MCNC: 0.2 MG/DL
BUN SERPL-MCNC: 7.2 MG/DL (ref 8.9–20.6)
CALCIUM SERPL-MCNC: 10 MG/DL (ref 8.4–10.2)
CHLORIDE SERPL-SCNC: 105 MMOL/L (ref 98–107)
CO2 SERPL-SCNC: 23 MMOL/L (ref 22–29)
CREAT SERPL-MCNC: 1.01 MG/DL (ref 0.73–1.18)
ERYTHROCYTE [DISTWIDTH] IN BLOOD BY AUTOMATED COUNT: 13.1 % (ref 11.5–17)
GFR SERPLBLD CREATININE-BSD FMLA CKD-EPI: >60 MLS/MIN/1.73/M2
GLOBULIN SER-MCNC: 6.3 GM/DL (ref 2.4–3.5)
GLUCOSE SERPL-MCNC: 82 MG/DL (ref 74–100)
HCT VFR BLD AUTO: 41.9 % (ref 42–52)
HGB BLD-MCNC: 14.5 G/DL (ref 14–18)
LYMPHOCYTES NFR BLD MANUAL: 1.6 X10(3)/MCL
LYMPHOCYTES NFR BLD MANUAL: 64 % (ref 13–40)
MACROCYTES BLD QL SMEAR: SLIGHT
MCH RBC QN AUTO: 33.3 PG (ref 27–31)
MCHC RBC AUTO-ENTMCNC: 34.6 G/DL (ref 33–36)
MCV RBC AUTO: 96.1 FL (ref 80–94)
MONOCYTES NFR BLD MANUAL: 0.33 X10(3)/MCL (ref 0.1–1.3)
MONOCYTES NFR BLD MANUAL: 13 % (ref 2–11)
NEUTROPHILS NFR BLD MANUAL: 23 % (ref 47–80)
NRBC BLD AUTO-RTO: 0 %
PLATELET # BLD AUTO: 314 X10(3)/MCL (ref 130–400)
PLATELET # BLD EST: NORMAL 10*3/UL
PMV BLD AUTO: 10 FL (ref 7.4–10.4)
POTASSIUM SERPL-SCNC: 3.7 MMOL/L (ref 3.5–5.1)
PROT SERPL-MCNC: 10.2 GM/DL (ref 6.4–8.3)
RBC # BLD AUTO: 4.36 X10(6)/MCL (ref 4.7–6.1)
RBC MORPH BLD: ABNORMAL
SODIUM SERPL-SCNC: 138 MMOL/L (ref 136–145)
WBC # SPEC AUTO: 2.5 X10(3)/MCL (ref 4.5–11.5)

## 2023-04-12 PROCEDURE — 85027 COMPLETE CBC AUTOMATED: CPT | Performed by: NURSE PRACTITIONER

## 2023-04-12 PROCEDURE — 36415 COLL VENOUS BLD VENIPUNCTURE: CPT | Performed by: NURSE PRACTITIONER

## 2023-04-12 PROCEDURE — 1159F MED LIST DOCD IN RCRD: CPT | Mod: CPTII,,, | Performed by: NURSE PRACTITIONER

## 2023-04-12 PROCEDURE — 1160F PR REVIEW ALL MEDS BY PRESCRIBER/CLIN PHARMACIST DOCUMENTED: ICD-10-PCS | Mod: CPTII,,, | Performed by: NURSE PRACTITIONER

## 2023-04-12 PROCEDURE — 3075F SYST BP GE 130 - 139MM HG: CPT | Mod: CPTII,,, | Performed by: NURSE PRACTITIONER

## 2023-04-12 PROCEDURE — 87536 HIV-1 QUANT&REVRSE TRNSCRPJ: CPT | Mod: 90 | Performed by: NURSE PRACTITIONER

## 2023-04-12 PROCEDURE — 3008F PR BODY MASS INDEX (BMI) DOCUMENTED: ICD-10-PCS | Mod: CPTII,,, | Performed by: NURSE PRACTITIONER

## 2023-04-12 PROCEDURE — 3079F PR MOST RECENT DIASTOLIC BLOOD PRESSURE 80-89 MM HG: ICD-10-PCS | Mod: CPTII,,, | Performed by: NURSE PRACTITIONER

## 2023-04-12 PROCEDURE — 3008F BODY MASS INDEX DOCD: CPT | Mod: CPTII,,, | Performed by: NURSE PRACTITIONER

## 2023-04-12 PROCEDURE — 1159F PR MEDICATION LIST DOCUMENTED IN MEDICAL RECORD: ICD-10-PCS | Mod: CPTII,,, | Performed by: NURSE PRACTITIONER

## 2023-04-12 PROCEDURE — 3075F PR MOST RECENT SYSTOLIC BLOOD PRESS GE 130-139MM HG: ICD-10-PCS | Mod: CPTII,,, | Performed by: NURSE PRACTITIONER

## 2023-04-12 PROCEDURE — 80053 COMPREHEN METABOLIC PANEL: CPT | Performed by: NURSE PRACTITIONER

## 2023-04-12 PROCEDURE — 3079F DIAST BP 80-89 MM HG: CPT | Mod: CPTII,,, | Performed by: NURSE PRACTITIONER

## 2023-04-12 PROCEDURE — 99214 PR OFFICE/OUTPT VISIT, EST, LEVL IV, 30-39 MIN: ICD-10-PCS | Mod: S$PBB,,, | Performed by: NURSE PRACTITIONER

## 2023-04-12 PROCEDURE — 1160F RVW MEDS BY RX/DR IN RCRD: CPT | Mod: CPTII,,, | Performed by: NURSE PRACTITIONER

## 2023-04-12 PROCEDURE — 99214 OFFICE O/P EST MOD 30 MIN: CPT | Mod: S$PBB,,, | Performed by: NURSE PRACTITIONER

## 2023-04-12 PROCEDURE — 99213 OFFICE O/P EST LOW 20 MIN: CPT | Mod: PBBFAC | Performed by: NURSE PRACTITIONER

## 2023-04-12 RX ORDER — MEGESTROL ACETATE 40 MG/ML
SUSPENSION ORAL
COMMUNITY
Start: 2023-03-10 | End: 2023-04-12

## 2023-04-12 RX ORDER — ERGOCALCIFEROL 1.25 MG/1
50000 CAPSULE ORAL
Qty: 12 CAPSULE | Refills: 1 | Status: SHIPPED | OUTPATIENT
Start: 2023-04-12 | End: 2023-05-18 | Stop reason: SDUPTHER

## 2023-04-12 RX ORDER — CETIRIZINE HYDROCHLORIDE 10 MG/1
10 TABLET ORAL DAILY
Qty: 90 TABLET | Refills: 1 | Status: SHIPPED | OUTPATIENT
Start: 2023-04-12 | End: 2023-08-23 | Stop reason: SDUPTHER

## 2023-04-12 RX ORDER — CHLORHEXIDINE GLUCONATE ORAL RINSE 1.2 MG/ML
15 SOLUTION DENTAL 2 TIMES DAILY
Qty: 473 ML | Refills: 0 | Status: SHIPPED | OUTPATIENT
Start: 2023-04-12 | End: 2023-05-18

## 2023-04-12 RX ORDER — AMOXICILLIN AND CLAVULANATE POTASSIUM 875; 125 MG/1; MG/1
1 TABLET, FILM COATED ORAL EVERY 12 HOURS
Qty: 20 TABLET | Refills: 0 | Status: SHIPPED | OUTPATIENT
Start: 2023-04-12 | End: 2023-05-18

## 2023-04-12 RX ORDER — SULFAMETHOXAZOLE AND TRIMETHOPRIM 800; 160 MG/1; MG/1
1 TABLET ORAL DAILY
Qty: 90 TABLET | Refills: 1 | Status: SHIPPED | OUTPATIENT
Start: 2023-04-12 | End: 2023-08-23 | Stop reason: SDUPTHER

## 2023-04-12 RX ORDER — BICTEGRAVIR SODIUM, EMTRICITABINE, AND TENOFOVIR ALAFENAMIDE FUMARATE 50; 200; 25 MG/1; MG/1; MG/1
1 TABLET ORAL DAILY
Qty: 90 TABLET | Refills: 1 | Status: SHIPPED | OUTPATIENT
Start: 2023-04-12 | End: 2023-08-23 | Stop reason: SDUPTHER

## 2023-04-12 NOTE — PROGRESS NOTES
"Subjective     Patient ID: Hanh Hassan is a 27 y.o. male.    Chief Complaint: Followup HIV (States + "canker" sore to mouth bilaterally with most bothersome on left side)    4/12/23  Hanh is a 26 yo AAM presenting today for HIV f/u visit.  He is about 6 weeks into Biktarvy treatment.  Has not missed any doses & is tolerating it well.  Labs 3/9/23 VL 1330, CD4 54.  Will repeat VL today.  He tolerated the Bactrim well with Zyrtec, needs refills. He has gained 35# in 3 months, will discontinue Megace at this juncture.  He tells me that rectal pain has resolved since starting ART. Completed Syphilis treatment 1 month ago as ordered. He denies any sexual encounters since time of diagnosis.  He tells me that he has canker sores in mouth x 1 week now.  Left side is swollen, painful, and warm. Appreciates evaluation and treatment. All questions answered & concerns addressed.    3/9/23  Hanh is a 26 yo AAM here today for HIV f/u visit.  He has been taking Biktarvy x 2 weeks now & is tolerating it well.  He tells me that he stopped taking Bactrim in the last week or so.  He states that he started to develop itching with Bactrim after the dose was lowered.  I advised pt that itching may be a result of a different factor and not related to Bactrim at all as he tolerated high dose Bactrim for PJP treatment x 3 weeks.  Will resume Bactrim DS daily for secondary prophylaxis with Zyrtec daily to minimize risk of PJP recurrence.  He voiced understanding & agreement.  He tells me that anal abscess ruptured 2 days after last visit & pain has resolved.  He has only a couple of doses of doxycycline to complete treatment as prescribed. He is taking vitamin d weekly, tolerates well. He has gained 20 # since hospitalization, appetite improved with Megace. Will continue for one more month. All questions answered & concerns addressed.     2/23/23  Hanh is a 26 yo AAM here today for initial HIV evaluation, diagnosed 1/23 while " admitted inpatient at [facility].  He is ART naive.  MSM, anal receptive. Has engaged in sexual relations with females in the past as well, approximately 10 lifetime partners.  First sexual encounter approximately 2010. STI history of syphilis 12/21 with a titer of 256 dils.  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 Denies any changes in vision, headaches, hearing loss.  Repeat titer on 1/23 with titer of 64 dils, treatment initiated with Bicillin 2.4 mil units IM weekly x 3 and is taking final dose today. Denies any sexual encounters since time of HIV diagnosis. States that he had a negative HIV test as recent as summer/fall 2022 but does not recall where this testing was performed. He was treated while inpatient for oropharyngeal candidiasis with fluconazole. States completed doses and thrush has not recurred. He is taking Azithromycin weekly as prescribed, states that he has been taking 2 tabs on Tuesdays but it does cause him some GI upset. Will try to separate the doses to see if that will make it more tolerable. He completed treatment with Bactrim DS 2 tabs TID for PJP diagnosed while IP as well.  He has converted to 1 tab daily for secondary prophylaxis.  Cryptococcal ag negative.  Denies any shortness of breath, dyspnea on exertion.  His heart rate is elevated today with low grade temperature.  He tells me that he started with what feels like an abscess to his rectal area last night. No drainage, no blistering. He is ready to start ART and agrees to adhere to treatment plan.  Extensive education provided and all questions answered.        Review of Systems   Constitutional: Negative.  Negative for chills, fatigue and fever.   HENT:  Positive for dental problem and mouth sores.    Respiratory: Negative.     Cardiovascular: Negative.    Gastrointestinal: Negative.    Genitourinary: Negative.    Integumentary:  Negative.   Neurological: Negative.    Hematological:  Negative.    Psychiatric/Behavioral: Negative.          Objective     Physical Exam  Vitals reviewed.   Constitutional:       General: He is not in acute distress.     Appearance: Normal appearance. He is not toxic-appearing.   HENT:      Mouth/Throat:      Mouth: Mucous membranes are moist.      Pharynx: Oropharyngeal exudate and posterior oropharyngeal erythema present.      Comments: Ulcerated lesion to bilateral oral mucosa, exudate noted to left side.   Eyes:      Conjunctiva/sclera: Conjunctivae normal.   Cardiovascular:      Rate and Rhythm: Normal rate and regular rhythm.   Pulmonary:      Effort: Pulmonary effort is normal. No respiratory distress.      Breath sounds: Normal breath sounds.   Abdominal:      General: Abdomen is flat. Bowel sounds are normal.      Palpations: Abdomen is soft.   Musculoskeletal:         General: Normal range of motion.      Cervical back: Normal range of motion.   Lymphadenopathy:      Cervical: No cervical adenopathy.   Skin:     General: Skin is warm and dry.   Neurological:      General: No focal deficit present.      Mental Status: He is alert and oriented to person, place, and time. Mental status is at baseline.   Psychiatric:         Mood and Affect: Mood normal.         Behavior: Behavior normal.          Assessment and Plan     Problem List Items Addressed This Visit    None  HIV disease  -     yzmjfhdar-glyywijv-gaggbzw ala (BIKTARVY) -25 mg (25 kg or greater); Take 1 tablet by mouth once daily.  Dispense: 90 tablet; Refill: 1  -     cetirizine (ZYRTEC) 10 MG tablet; Take 1 tablet (10 mg total) by mouth once daily.  Dispense: 90 tablet; Refill: 1  -     CBC Auto Differential; Future; Expected date: 04/12/2023  -     Comprehensive Metabolic Panel  -     HIV-1 RNA, Quantitative, PCR with Reflex to Genotype; Future; Expected date: 04/12/2023  Dx 1/23  CD4 porfirio 8, VL zenith [id number]  Continue Biktarvy 1 po daily as prescribed.  Labs today.  RTC 4 weeks with Vivi.      HIV Wellness:  Anal pap: N/A  Oral CT/GC: unable to collect s/t gag reflex 2/23  Anal CT/GC: 2/23 Neg  Urine CT/GC: 1/23 Neg  RPR: 12/21 256 dils, 1/23 64 dils.  Ophth: referred 3/9/23    History of Pneumocystis jirovecii pneumonia  -     sulfamethoxazole-trimethoprim 800-160mg (BACTRIM DS) 800-160 mg Tab; Take 1 tablet by mouth once daily.  Dispense: 90 tablet; Refill: 1  Completed 21 days of Bactrim DS 2 tabs tid.   Continue secondary prophylaxis with Bactrim DS 1 po daily.  Take with Zyrtec to minimize risk of dermatitis.     Vitamin D deficiency  -     ergocalciferol (ERGOCALCIFEROL) 50,000 unit Cap; Take 1 capsule (50,000 Units total) by mouth every 7 days.  Dispense: 12 capsule; Refill: 1  Resume Ergocalciferol 28353 units weekly as prescribed.    Infection of oral mucosa  -     amoxicillin-clavulanate 875-125mg (AUGMENTIN) 875-125 mg per tablet; Take 1 tablet by mouth every 12 (twelve) hours. for 10 days  Dispense: 20 tablet; Refill: 0  -     chlorhexidine (PERIDEX) 0.12 % solution; Use as directed 15 mLs in the mouth or throat 2 (two) times daily. for 14 days  Dispense: 473 mL; Refill: 0  Chlorhexadine 15 ml swish & spit bid x 2 weeks.   Augmentin 875 mg po bid x 10 days.   Notify me if not fully resolved.

## 2023-04-14 LAB — HIV1 RNA # PLAS NAA DL=20: 220 COPIES/ML

## 2023-05-01 PROBLEM — J18.9 PNEUMONIA DUE TO INFECTIOUS ORGANISM: Status: RESOLVED | Noted: 2023-01-26 | Resolved: 2023-05-01

## 2023-05-18 ENCOUNTER — OFFICE VISIT (OUTPATIENT)
Dept: INFECTIOUS DISEASES | Facility: CLINIC | Age: 28
End: 2023-05-18
Payer: MEDICAID

## 2023-05-18 VITALS
SYSTOLIC BLOOD PRESSURE: 121 MMHG | TEMPERATURE: 98 F | DIASTOLIC BLOOD PRESSURE: 86 MMHG | BODY MASS INDEX: 19.99 KG/M2 | HEIGHT: 69 IN | HEART RATE: 84 BPM | RESPIRATION RATE: 16 BRPM | WEIGHT: 135 LBS

## 2023-05-18 DIAGNOSIS — Z86.19 HISTORY OF SYPHILIS: ICD-10-CM

## 2023-05-18 DIAGNOSIS — Z86.19 HISTORY OF PNEUMOCYSTIS JIROVECII PNEUMONIA: ICD-10-CM

## 2023-05-18 DIAGNOSIS — B20 HIV DISEASE: Primary | ICD-10-CM

## 2023-05-18 DIAGNOSIS — E55.9 VITAMIN D DEFICIENCY: ICD-10-CM

## 2023-05-18 LAB
ALBUMIN SERPL-MCNC: 3.5 G/DL (ref 3.5–5)
ALBUMIN/GLOB SERPL: 0.7 RATIO (ref 1.1–2)
ALP SERPL-CCNC: 80 UNIT/L (ref 40–150)
ALT SERPL-CCNC: 9 UNIT/L (ref 0–55)
AST SERPL-CCNC: 12 UNIT/L (ref 5–34)
BASOPHILS # BLD AUTO: 0.02 X10(3)/MCL
BASOPHILS NFR BLD AUTO: 0.3 %
BILIRUBIN DIRECT+TOT PNL SERPL-MCNC: 0.2 MG/DL
BUN SERPL-MCNC: 7.3 MG/DL (ref 8.9–20.6)
CALCIUM SERPL-MCNC: 9.8 MG/DL (ref 8.4–10.2)
CHLORIDE SERPL-SCNC: 105 MMOL/L (ref 98–107)
CO2 SERPL-SCNC: 28 MMOL/L (ref 22–29)
CREAT SERPL-MCNC: 0.96 MG/DL (ref 0.73–1.18)
EOSINOPHIL # BLD AUTO: 0.11 X10(3)/MCL (ref 0–0.9)
EOSINOPHIL NFR BLD AUTO: 1.6 %
ERYTHROCYTE [DISTWIDTH] IN BLOOD BY AUTOMATED COUNT: 13.4 % (ref 11.5–17)
GFR SERPLBLD CREATININE-BSD FMLA CKD-EPI: >60 MLS/MIN/1.73/M2
GLOBULIN SER-MCNC: 5.3 GM/DL (ref 2.4–3.5)
GLUCOSE SERPL-MCNC: 71 MG/DL (ref 74–100)
HCT VFR BLD AUTO: 42.3 % (ref 42–52)
HGB BLD-MCNC: 13.8 G/DL (ref 14–18)
IMM GRANULOCYTES # BLD AUTO: 0.04 X10(3)/MCL (ref 0–0.04)
IMM GRANULOCYTES NFR BLD AUTO: 0.6 %
LYMPHOCYTES # BLD AUTO: 1.34 X10(3)/MCL (ref 0.6–4.6)
LYMPHOCYTES NFR BLD AUTO: 19.8 %
MCH RBC QN AUTO: 31.7 PG (ref 27–31)
MCHC RBC AUTO-ENTMCNC: 32.6 G/DL (ref 33–36)
MCV RBC AUTO: 97.2 FL (ref 80–94)
MONOCYTES # BLD AUTO: 0.57 X10(3)/MCL (ref 0.1–1.3)
MONOCYTES NFR BLD AUTO: 8.4 %
NEUTROPHILS # BLD AUTO: 4.7 X10(3)/MCL (ref 2.1–9.2)
NEUTROPHILS NFR BLD AUTO: 69.3 %
NRBC BLD AUTO-RTO: 0 %
PLATELET # BLD AUTO: 384 X10(3)/MCL (ref 130–400)
PMV BLD AUTO: 10.1 FL (ref 7.4–10.4)
POTASSIUM SERPL-SCNC: 3.9 MMOL/L (ref 3.5–5.1)
PROT SERPL-MCNC: 8.8 GM/DL (ref 6.4–8.3)
RBC # BLD AUTO: 4.35 X10(6)/MCL (ref 4.7–6.1)
SODIUM SERPL-SCNC: 140 MMOL/L (ref 136–145)
T PALLIDUM AB SER QL: REACTIVE
WBC # SPEC AUTO: 6.78 X10(3)/MCL (ref 4.5–11.5)

## 2023-05-18 PROCEDURE — 1160F PR REVIEW ALL MEDS BY PRESCRIBER/CLIN PHARMACIST DOCUMENTED: ICD-10-PCS | Mod: CPTII,,, | Performed by: NURSE PRACTITIONER

## 2023-05-18 PROCEDURE — 1159F MED LIST DOCD IN RCRD: CPT | Mod: CPTII,,, | Performed by: NURSE PRACTITIONER

## 2023-05-18 PROCEDURE — 86780 TREPONEMA PALLIDUM: CPT | Performed by: NURSE PRACTITIONER

## 2023-05-18 PROCEDURE — 3074F PR MOST RECENT SYSTOLIC BLOOD PRESSURE < 130 MM HG: ICD-10-PCS | Mod: CPTII,,, | Performed by: NURSE PRACTITIONER

## 2023-05-18 PROCEDURE — 3074F SYST BP LT 130 MM HG: CPT | Mod: CPTII,,, | Performed by: NURSE PRACTITIONER

## 2023-05-18 PROCEDURE — 86592 SYPHILIS TEST NON-TREP QUAL: CPT | Performed by: NURSE PRACTITIONER

## 2023-05-18 PROCEDURE — 3079F DIAST BP 80-89 MM HG: CPT | Mod: CPTII,,, | Performed by: NURSE PRACTITIONER

## 2023-05-18 PROCEDURE — 80053 COMPREHEN METABOLIC PANEL: CPT | Performed by: NURSE PRACTITIONER

## 2023-05-18 PROCEDURE — 3008F PR BODY MASS INDEX (BMI) DOCUMENTED: ICD-10-PCS | Mod: CPTII,,, | Performed by: NURSE PRACTITIONER

## 2023-05-18 PROCEDURE — 1160F RVW MEDS BY RX/DR IN RCRD: CPT | Mod: CPTII,,, | Performed by: NURSE PRACTITIONER

## 2023-05-18 PROCEDURE — 99214 OFFICE O/P EST MOD 30 MIN: CPT | Mod: S$PBB,,, | Performed by: NURSE PRACTITIONER

## 2023-05-18 PROCEDURE — 99214 PR OFFICE/OUTPT VISIT, EST, LEVL IV, 30-39 MIN: ICD-10-PCS | Mod: S$PBB,,, | Performed by: NURSE PRACTITIONER

## 2023-05-18 PROCEDURE — 36415 COLL VENOUS BLD VENIPUNCTURE: CPT | Performed by: NURSE PRACTITIONER

## 2023-05-18 PROCEDURE — 85025 COMPLETE CBC W/AUTO DIFF WBC: CPT | Performed by: NURSE PRACTITIONER

## 2023-05-18 PROCEDURE — 1159F PR MEDICATION LIST DOCUMENTED IN MEDICAL RECORD: ICD-10-PCS | Mod: CPTII,,, | Performed by: NURSE PRACTITIONER

## 2023-05-18 PROCEDURE — 86361 T CELL ABSOLUTE COUNT: CPT | Performed by: NURSE PRACTITIONER

## 2023-05-18 PROCEDURE — 3008F BODY MASS INDEX DOCD: CPT | Mod: CPTII,,, | Performed by: NURSE PRACTITIONER

## 2023-05-18 PROCEDURE — 3079F PR MOST RECENT DIASTOLIC BLOOD PRESSURE 80-89 MM HG: ICD-10-PCS | Mod: CPTII,,, | Performed by: NURSE PRACTITIONER

## 2023-05-18 PROCEDURE — 87536 HIV-1 QUANT&REVRSE TRNSCRPJ: CPT | Mod: 90 | Performed by: NURSE PRACTITIONER

## 2023-05-18 PROCEDURE — 99213 OFFICE O/P EST LOW 20 MIN: CPT | Mod: PBBFAC | Performed by: NURSE PRACTITIONER

## 2023-05-18 RX ORDER — ERGOCALCIFEROL 1.25 MG/1
50000 CAPSULE ORAL
Qty: 12 CAPSULE | Refills: 1 | Status: SHIPPED | OUTPATIENT
Start: 2023-05-18 | End: 2023-08-23 | Stop reason: SDUPTHER

## 2023-05-18 NOTE — PROGRESS NOTES
Subjective     Patient ID: Hanh Hassan is a 27 y.o. male.    Chief Complaint: Followup HIV (Denies problems)    5/18/23  Hanh is a 28 yo AAM here today for HIV f/u visit.  He is taking Biktarvy daily, last labs 4/12/23 , 3/9/23 Cd4 54.  He is tolerating Bactrim DS well with Zyrtec daily as well.  His mouth lesions healed with prescribed treatment.  He is breathing well, denies any shortness of breath or activity intolerance. Not sexually active since time of diagnosis.  Not currently taking Vitamin D, states that [facility] did not have it ready for him. He is interested in seeking counseling, will provide with a list of counselors.  He is also considering returning to school, considering [employer].  Encouragement provided.  All questions answered & concerns addressed.    4/12/23  Hanh is a 28 yo AAM presenting today for HIV f/u visit.  He is about 6 weeks into Biktarvy treatment.  Has not missed any doses & is tolerating it well.  Labs 3/9/23 VL 1330, CD4 54.  Will repeat VL today.  He tolerated the Bactrim well with Zyrtec, needs refills. He has gained 35# in 3 months, will discontinue Megace at this juncture.  He tells me that rectal pain has resolved since starting ART. Completed Syphilis treatment 1 month ago as ordered. He denies any sexual encounters since time of diagnosis.  He tells me that he has canker sores in mouth x 1 week now.  Left side is swollen, painful, and warm. Appreciates evaluation and treatment. All questions answered & concerns addressed.     3/9/23  Hanh is a 28 yo AAM here today for HIV f/u visit.  He has been taking Biktarvy x 2 weeks now & is tolerating it well.  He tells me that he stopped taking Bactrim in the last week or so.  He states that he started to develop itching with Bactrim after the dose was lowered.  I advised pt that itching may be a result of a different factor and not related to Bactrim at all as he tolerated high dose Bactrim for PJP treatment x 3 weeks.   Will resume Bactrim DS daily for secondary prophylaxis with Zyrtec daily to minimize risk of PJP recurrence.  He voiced understanding & agreement.  He tells me that anal abscess ruptured 2 days after last visit & pain has resolved.  He has only a couple of doses of doxycycline to complete treatment as prescribed. He is taking vitamin d weekly, tolerates well. He has gained 20 # since hospitalization, appetite improved with Megace. Will continue for one more month. All questions answered & concerns addressed.    Review of Systems   Constitutional: Negative.    HENT: Negative.     Respiratory: Negative.     Cardiovascular: Negative.    Gastrointestinal: Negative.    Genitourinary: Negative.    Integumentary:  Negative.   Neurological: Negative.    Hematological: Negative.    Psychiatric/Behavioral: Negative.          Objective     Physical Exam  Vitals reviewed.   Constitutional:       General: He is not in acute distress.     Appearance: Normal appearance. He is not toxic-appearing.   HENT:      Mouth/Throat:      Mouth: Mucous membranes are moist.      Pharynx: Oropharynx is clear.   Eyes:      Conjunctiva/sclera: Conjunctivae normal.   Cardiovascular:      Rate and Rhythm: Normal rate and regular rhythm.   Pulmonary:      Effort: Pulmonary effort is normal. No respiratory distress.      Breath sounds: Normal breath sounds.   Abdominal:      General: Abdomen is flat. Bowel sounds are normal.      Palpations: Abdomen is soft.   Musculoskeletal:         General: Normal range of motion.      Cervical back: Normal range of motion.   Lymphadenopathy:      Cervical: Cervical adenopathy present.   Skin:     General: Skin is warm and dry.   Neurological:      General: No focal deficit present.      Mental Status: He is alert and oriented to person, place, and time. Mental status is at baseline.   Psychiatric:         Mood and Affect: Mood normal.         Behavior: Behavior normal.          Assessment and Plan     Problem  List Items Addressed This Visit    None  Visit Diagnoses       HIV disease    -  Primary          HIV disease  -     HIV-1 RNA, Quantitative, PCR with Reflex to Genotype; Future; Expected date: 05/18/2023  -     CD4 Lymphocytes; Future; Expected date: 05/18/2023  -     CBC Auto Differential; Future; Expected date: 05/18/2023  -     Comprehensive Metabolic Panel  Dx 1/23  CD4 porfirio 8, VL zenith [id number]  Continue Biktarvy 1 po daily as prescribed.  Labs today.  RTC 6 weeks with Vivi.  Will provide with list of medicaid accepting counselors.     HIV Wellness:  Anal pap: N/A  Oral CT/GC: unable to collect s/t gag reflex 2/23  Anal CT/GC: 2/23 Neg  Urine CT/GC: 1/23 Neg  RPR: 12/21 256 dils, 1/23 64 dils.  Ophth: appt 6/19/23    History of Pneumocystis jirovecii pneumonia  Completed 21 days of Bactrim DS 2 tabs tid.   Continue secondary prophylaxis with Bactrim DS 1 po daily.  Take with Zyrtec to minimize risk of dermatitis.     Vitamin D deficiency  -     ergocalciferol (ERGOCALCIFEROL) 50,000 unit Cap; Take 1 capsule (50,000 Units total) by mouth every 7 days.  Dispense: 12 capsule; Refill: 1    History of syphilis  -     SYPHILIS ANTIBODY (WITH REFLEX RPR); Future; Expected date: 05/18/2023  Repeat RPR titer today.

## 2023-05-19 LAB
AGE: 27
CD3+CD4+ CELLS # SPEC: 103.37 UNIT/L (ref 589–1505)
CD3+CD4+ CELLS NFR BLD: 7.7 %
LYMPHOCYTES # BLD AUTO: 1342.44 X10(3)/MCL (ref 1260–5520)
LYMPHOCYTES NFR LN MANUAL: 19.8 % (ref 28–48)
LYMPHOMA - T-CELL MARKERS SPEC-IMP: ABNORMAL
RPR SER QL: REACTIVE
RPR SER-TITR: ABNORMAL {TITER}
WBC # BLD AUTO: 6780 /MM3 (ref 4500–11500)

## 2023-05-22 LAB — HIV1 RNA # PLAS NAA DL=20: 54 COPIES/ML

## 2023-06-19 ENCOUNTER — OFFICE VISIT (OUTPATIENT)
Dept: OPHTHALMOLOGY | Facility: CLINIC | Age: 28
End: 2023-06-19
Payer: MEDICAID

## 2023-06-19 VITALS — WEIGHT: 134.94 LBS | HEIGHT: 69 IN | BODY MASS INDEX: 19.99 KG/M2

## 2023-06-19 DIAGNOSIS — B20 HIV DISEASE: ICD-10-CM

## 2023-06-19 PROCEDURE — 92134 CPTRZ OPH DX IMG PST SGM RTA: CPT | Mod: PBBFAC,PO | Performed by: OPHTHALMOLOGY

## 2023-06-19 PROCEDURE — 99213 OFFICE O/P EST LOW 20 MIN: CPT | Mod: PBBFAC,PO | Performed by: STUDENT IN AN ORGANIZED HEALTH CARE EDUCATION/TRAINING PROGRAM

## 2023-06-19 PROCEDURE — 92134 CPTRZ OPH DX IMG PST SGM RTA: CPT | Mod: PBBFAC,PO | Performed by: STUDENT IN AN ORGANIZED HEALTH CARE EDUCATION/TRAINING PROGRAM

## 2023-06-19 NOTE — PROGRESS NOTES
HPI    Referred for dilated exam  Last edited by Oanh Messer MA on 6/19/2023  1:34 PM.            Assessment /Plan     For exam results, see Encounter Report.    HIV disease  -     Ambulatory referral/consult to Ophthalmology                 HIV without retinopathy   - cd4 103 (7.7%)   - on haart, rec continued good compliance   - no retinopathy on exam   - octm 6/2023: good contour, no srf/irf   - RTC 6 month dfe octm

## 2023-06-28 ENCOUNTER — OFFICE VISIT (OUTPATIENT)
Dept: INFECTIOUS DISEASES | Facility: CLINIC | Age: 28
End: 2023-06-28
Payer: MEDICAID

## 2023-06-28 DIAGNOSIS — Z86.19 HISTORY OF SYPHILIS: ICD-10-CM

## 2023-06-28 DIAGNOSIS — E55.9 VITAMIN D DEFICIENCY: ICD-10-CM

## 2023-06-28 DIAGNOSIS — B20 HIV DISEASE: Primary | ICD-10-CM

## 2023-06-28 PROCEDURE — 1159F MED LIST DOCD IN RCRD: CPT | Mod: CPTII,,, | Performed by: NURSE PRACTITIONER

## 2023-06-28 PROCEDURE — 99214 PR OFFICE/OUTPT VISIT, EST, LEVL IV, 30-39 MIN: ICD-10-PCS | Mod: S$PBB,,, | Performed by: NURSE PRACTITIONER

## 2023-06-28 PROCEDURE — 99214 OFFICE O/P EST MOD 30 MIN: CPT | Mod: S$PBB,,, | Performed by: NURSE PRACTITIONER

## 2023-06-28 PROCEDURE — 1159F PR MEDICATION LIST DOCUMENTED IN MEDICAL RECORD: ICD-10-PCS | Mod: CPTII,,, | Performed by: NURSE PRACTITIONER

## 2023-06-28 PROCEDURE — 99213 OFFICE O/P EST LOW 20 MIN: CPT | Mod: PBBFAC | Performed by: NURSE PRACTITIONER

## 2023-06-28 PROCEDURE — 1160F RVW MEDS BY RX/DR IN RCRD: CPT | Mod: CPTII,,, | Performed by: NURSE PRACTITIONER

## 2023-06-28 PROCEDURE — 1160F PR REVIEW ALL MEDS BY PRESCRIBER/CLIN PHARMACIST DOCUMENTED: ICD-10-PCS | Mod: CPTII,,, | Performed by: NURSE PRACTITIONER

## 2023-06-28 NOTE — PROGRESS NOTES
Subjective     Patient ID: Hanh Hassan is a 28 y.o. male.    Chief Complaint: Followup HIV (Denies problems)    6/28/23  Hanh is a 27 yo AAM presenting today for HIV f/u. He takes Biktarvy daily for 3 months now. Labs 5/18/23 VL 54, Cd4 103, RPR titer 16 dils.  He is experiencing hair regrowth and is feeling much better overall.  Denies any sexual encounters. He is now taking the vitamin D weekly as prescribed.  Has not yet sought out counseling, but is still contemplating it.  He is starting to feel somewhat better emotionally as well, voiced appreciation for support provided in clinic. All questions answered & concerns addressed.    5/18/23  Hanh is a 28 yo AAM here today for HIV f/u visit.  He is taking Biktarvy daily, last labs 4/12/23 , 3/9/23 Cd4 54.  He is tolerating Bactrim DS well with Zyrtec daily as well.  His mouth lesions healed with prescribed treatment.  He is breathing well, denies any shortness of breath or activity intolerance. Not sexually active since time of diagnosis.  Not currently taking Vitamin D, states that [facility] did not have it ready for him. He is interested in seeking counseling, will provide with a list of counselors.  He is also considering returning to [facility], [facility]  Encouragement provided.  All questions answered & concerns addressed.     4/12/23  Hanh is a 28 yo AAM presenting today for HIV f/u visit.  He is about 6 weeks into Biktarvy treatment.  Has not missed any doses & is tolerating it well.  Labs 3/9/23 VL 1330, CD4 54.  Will repeat VL today.  He tolerated the Bactrim well with Zyrtec, needs refills. He has gained 35# in 3 months, will discontinue Megace at this juncture.  He tells me that rectal pain has resolved since starting ART. Completed Syphilis treatment 1 month ago as ordered. He denies any sexual encounters since time of diagnosis.  He tells me that he has canker sores in mouth x 1 week now.  Left side is swollen, painful, and warm.  Appreciates evaluation and treatment. All questions answered & concerns addressed.    Review of Systems   Constitutional: Negative.    HENT: Negative.     Respiratory: Negative.     Cardiovascular: Negative.    Gastrointestinal: Negative.    Genitourinary: Negative.    Integumentary:  Negative.   Neurological: Negative.    Hematological: Negative.    Psychiatric/Behavioral: Negative.          Objective     Physical Exam  Vitals reviewed.   Constitutional:       General: He is not in acute distress.     Appearance: Normal appearance. He is not toxic-appearing.   HENT:      Mouth/Throat:      Mouth: Mucous membranes are moist.      Pharynx: Oropharynx is clear.   Eyes:      Conjunctiva/sclera: Conjunctivae normal.   Cardiovascular:      Rate and Rhythm: Normal rate and regular rhythm.   Pulmonary:      Effort: Pulmonary effort is normal. No respiratory distress.      Breath sounds: Normal breath sounds.   Abdominal:      General: Abdomen is flat. Bowel sounds are normal.      Palpations: Abdomen is soft.   Musculoskeletal:         General: Normal range of motion.      Cervical back: Normal range of motion.   Lymphadenopathy:      Cervical: No cervical adenopathy.   Skin:     General: Skin is warm and dry.   Neurological:      General: No focal deficit present.      Mental Status: He is alert and oriented to person, place, and time. Mental status is at baseline.   Psychiatric:         Mood and Affect: Mood normal.         Behavior: Behavior normal.          Assessment and Plan     1. HIV disease  Dx 1/23  CD4 porfirio 8, VL zenith [id number]  Continue Biktarvy 1 po daily as prescribed.  Labs next visit.  RTC 2 months with Vivi.     HIV Wellness:  Anal pap: N/A  Oral CT/GC: unable to collect s/t gag reflex 2/23  Anal CT/GC: 2/23 Neg  Urine CT/GC: 1/23 Neg  RPR: 12/21 256 dils, 1/23 64 dils, 5/23 16 dils  Ophth: appt 6/19/23    2. Vitamin D deficiency  Continue Vitamin D weekly    3. History of syphilis  Baseline titer 256  dils 12/21 1/23 64 dils  5/23 16 dils  Repeat titer next labs.     4. History of Pneumocystis jirovecii pneumonia  Completed 21 days of Bactrim DS 2 tabs tid.   Continue secondary prophylaxis with Bactrim DS 1 po daily.  Take with Zyrtec to minimize risk of dermatitis if needed.

## 2023-08-23 ENCOUNTER — OFFICE VISIT (OUTPATIENT)
Dept: INFECTIOUS DISEASES | Facility: CLINIC | Age: 28
End: 2023-08-23
Payer: MEDICAID

## 2023-08-23 VITALS
HEART RATE: 96 BPM | DIASTOLIC BLOOD PRESSURE: 68 MMHG | BODY MASS INDEX: 19.55 KG/M2 | WEIGHT: 129 LBS | TEMPERATURE: 98 F | SYSTOLIC BLOOD PRESSURE: 120 MMHG | RESPIRATION RATE: 16 BRPM | HEIGHT: 68 IN

## 2023-08-23 DIAGNOSIS — Z86.19 HISTORY OF PNEUMOCYSTIS JIROVECII PNEUMONIA: ICD-10-CM

## 2023-08-23 DIAGNOSIS — E55.9 VITAMIN D DEFICIENCY: ICD-10-CM

## 2023-08-23 DIAGNOSIS — Z86.19 HISTORY OF SYPHILIS: ICD-10-CM

## 2023-08-23 DIAGNOSIS — B20 HIV DISEASE: Primary | ICD-10-CM

## 2023-08-23 LAB
ALBUMIN SERPL-MCNC: 3.7 G/DL (ref 3.5–5)
ALBUMIN/GLOB SERPL: 0.8 RATIO (ref 1.1–2)
ALP SERPL-CCNC: 100 UNIT/L (ref 40–150)
ALT SERPL-CCNC: 10 UNIT/L (ref 0–55)
AST SERPL-CCNC: 12 UNIT/L (ref 5–34)
BASOPHILS # BLD AUTO: 0.03 X10(3)/MCL
BASOPHILS NFR BLD AUTO: 0.9 %
BILIRUB SERPL-MCNC: 0.2 MG/DL
BUN SERPL-MCNC: 5.8 MG/DL (ref 8.9–20.6)
CALCIUM SERPL-MCNC: 9.4 MG/DL (ref 8.4–10.2)
CHLORIDE SERPL-SCNC: 108 MMOL/L (ref 98–107)
CO2 SERPL-SCNC: 26 MMOL/L (ref 22–29)
CREAT SERPL-MCNC: 0.92 MG/DL (ref 0.73–1.18)
EOSINOPHIL # BLD AUTO: 0.07 X10(3)/MCL (ref 0–0.9)
EOSINOPHIL NFR BLD AUTO: 2.1 %
ERYTHROCYTE [DISTWIDTH] IN BLOOD BY AUTOMATED COUNT: 13.3 % (ref 11.5–17)
GFR SERPLBLD CREATININE-BSD FMLA CKD-EPI: >60 MLS/MIN/1.73/M2
GLOBULIN SER-MCNC: 4.5 GM/DL (ref 2.4–3.5)
GLUCOSE SERPL-MCNC: 53 MG/DL (ref 74–100)
HCT VFR BLD AUTO: 44.9 % (ref 42–52)
HGB BLD-MCNC: 14.6 G/DL (ref 14–18)
IMM GRANULOCYTES # BLD AUTO: 0.01 X10(3)/MCL (ref 0–0.04)
IMM GRANULOCYTES NFR BLD AUTO: 0.3 %
LYMPHOCYTES # BLD AUTO: 1.11 X10(3)/MCL (ref 0.6–4.6)
LYMPHOCYTES NFR BLD AUTO: 33.6 %
MCH RBC QN AUTO: 31.4 PG (ref 27–31)
MCHC RBC AUTO-ENTMCNC: 32.5 G/DL (ref 33–36)
MCV RBC AUTO: 96.6 FL (ref 80–94)
MONOCYTES # BLD AUTO: 0.34 X10(3)/MCL (ref 0.1–1.3)
MONOCYTES NFR BLD AUTO: 10.3 %
NEUTROPHILS # BLD AUTO: 1.74 X10(3)/MCL (ref 2.1–9.2)
NEUTROPHILS NFR BLD AUTO: 52.8 %
NRBC BLD AUTO-RTO: 0 %
PLATELET # BLD AUTO: 289 X10(3)/MCL (ref 130–400)
PMV BLD AUTO: 10.2 FL (ref 7.4–10.4)
POTASSIUM SERPL-SCNC: 3.4 MMOL/L (ref 3.5–5.1)
PROT SERPL-MCNC: 8.2 GM/DL (ref 6.4–8.3)
RBC # BLD AUTO: 4.65 X10(6)/MCL (ref 4.7–6.1)
SODIUM SERPL-SCNC: 143 MMOL/L (ref 136–145)
T PALLIDUM AB SER QL: REACTIVE
WBC # SPEC AUTO: 3.3 X10(3)/MCL (ref 4.5–11.5)

## 2023-08-23 PROCEDURE — 87536 HIV-1 QUANT&REVRSE TRNSCRPJ: CPT | Performed by: NURSE PRACTITIONER

## 2023-08-23 PROCEDURE — 3044F HG A1C LEVEL LT 7.0%: CPT | Mod: CPTII,,, | Performed by: NURSE PRACTITIONER

## 2023-08-23 PROCEDURE — 3078F DIAST BP <80 MM HG: CPT | Mod: CPTII,,, | Performed by: NURSE PRACTITIONER

## 2023-08-23 PROCEDURE — 3008F PR BODY MASS INDEX (BMI) DOCUMENTED: ICD-10-PCS | Mod: CPTII,,, | Performed by: NURSE PRACTITIONER

## 2023-08-23 PROCEDURE — 1159F MED LIST DOCD IN RCRD: CPT | Mod: CPTII,,, | Performed by: NURSE PRACTITIONER

## 2023-08-23 PROCEDURE — 86592 SYPHILIS TEST NON-TREP QUAL: CPT | Performed by: NURSE PRACTITIONER

## 2023-08-23 PROCEDURE — 3074F SYST BP LT 130 MM HG: CPT | Mod: CPTII,,, | Performed by: NURSE PRACTITIONER

## 2023-08-23 PROCEDURE — 1160F PR REVIEW ALL MEDS BY PRESCRIBER/CLIN PHARMACIST DOCUMENTED: ICD-10-PCS | Mod: CPTII,,, | Performed by: NURSE PRACTITIONER

## 2023-08-23 PROCEDURE — 3078F PR MOST RECENT DIASTOLIC BLOOD PRESSURE < 80 MM HG: ICD-10-PCS | Mod: CPTII,,, | Performed by: NURSE PRACTITIONER

## 2023-08-23 PROCEDURE — 3074F PR MOST RECENT SYSTOLIC BLOOD PRESSURE < 130 MM HG: ICD-10-PCS | Mod: CPTII,,, | Performed by: NURSE PRACTITIONER

## 2023-08-23 PROCEDURE — 3044F PR MOST RECENT HEMOGLOBIN A1C LEVEL <7.0%: ICD-10-PCS | Mod: CPTII,,, | Performed by: NURSE PRACTITIONER

## 2023-08-23 PROCEDURE — 3008F BODY MASS INDEX DOCD: CPT | Mod: CPTII,,, | Performed by: NURSE PRACTITIONER

## 2023-08-23 PROCEDURE — 99214 PR OFFICE/OUTPT VISIT, EST, LEVL IV, 30-39 MIN: ICD-10-PCS | Mod: S$PBB,,, | Performed by: NURSE PRACTITIONER

## 2023-08-23 PROCEDURE — 86780 TREPONEMA PALLIDUM: CPT | Performed by: NURSE PRACTITIONER

## 2023-08-23 PROCEDURE — 99213 OFFICE O/P EST LOW 20 MIN: CPT | Mod: PBBFAC | Performed by: NURSE PRACTITIONER

## 2023-08-23 PROCEDURE — 36415 COLL VENOUS BLD VENIPUNCTURE: CPT | Performed by: NURSE PRACTITIONER

## 2023-08-23 PROCEDURE — 86361 T CELL ABSOLUTE COUNT: CPT | Performed by: NURSE PRACTITIONER

## 2023-08-23 PROCEDURE — 85025 COMPLETE CBC W/AUTO DIFF WBC: CPT | Performed by: NURSE PRACTITIONER

## 2023-08-23 PROCEDURE — 99214 OFFICE O/P EST MOD 30 MIN: CPT | Mod: S$PBB,,, | Performed by: NURSE PRACTITIONER

## 2023-08-23 PROCEDURE — 80053 COMPREHEN METABOLIC PANEL: CPT | Performed by: NURSE PRACTITIONER

## 2023-08-23 PROCEDURE — 1159F PR MEDICATION LIST DOCUMENTED IN MEDICAL RECORD: ICD-10-PCS | Mod: CPTII,,, | Performed by: NURSE PRACTITIONER

## 2023-08-23 PROCEDURE — 1160F RVW MEDS BY RX/DR IN RCRD: CPT | Mod: CPTII,,, | Performed by: NURSE PRACTITIONER

## 2023-08-23 RX ORDER — BICTEGRAVIR SODIUM, EMTRICITABINE, AND TENOFOVIR ALAFENAMIDE FUMARATE 50; 200; 25 MG/1; MG/1; MG/1
1 TABLET ORAL DAILY
Qty: 30 TABLET | Refills: 4 | Status: SHIPPED | OUTPATIENT
Start: 2023-08-23 | End: 2024-01-04 | Stop reason: SDUPTHER

## 2023-08-23 RX ORDER — CETIRIZINE HYDROCHLORIDE 10 MG/1
10 TABLET ORAL DAILY
Qty: 90 TABLET | Refills: 1 | Status: SHIPPED | OUTPATIENT
Start: 2023-08-23 | End: 2024-08-22

## 2023-08-23 RX ORDER — SULFAMETHOXAZOLE AND TRIMETHOPRIM 800; 160 MG/1; MG/1
1 TABLET ORAL DAILY
Qty: 90 TABLET | Refills: 1 | Status: SHIPPED | OUTPATIENT
Start: 2023-08-23

## 2023-08-23 RX ORDER — ERGOCALCIFEROL 1.25 MG/1
50000 CAPSULE ORAL
Qty: 12 CAPSULE | Refills: 1 | Status: SHIPPED | OUTPATIENT
Start: 2023-08-23

## 2023-08-23 NOTE — PROGRESS NOTES
Patient ID: Hanh Hassan 28 y.o.     Chief Complaint:   Chief Complaint   Patient presents with    Follow-up     B20          HPI:  8/23/23  Hanh is a 29 yo AAM here today for HIV f/u visit.  He is taking Biktarvy daily & tolerates it well. Will update labs today. Not currently taking Bactrim DS, ran out & needs to  refills.  Stressed importance of doing so for secondary prophylaxis. Voiced understanding. He is feeling well overall.  Denies any sexual encounters since time of diagnosis.  Mood is stable, still contemplating counseling. No questions or concerns today.     6/28/23  Hanh is a 29 yo AAM presenting today for HIV f/u. He takes Biktarvy daily for 3 months now. Labs 5/18/23 VL 54, Cd4 103, RPR titer 16 dils.  He is experiencing hair regrowth and is feeling much better overall.  Denies any sexual encounters. He is now taking the vitamin D weekly as prescribed.  Has not yet sought out counseling, but is still contemplating it.  He is starting to feel somewhat better emotionally as well, voiced appreciation for support provided in clinic. All questions answered & concerns addressed.     5/18/23  Hanh is a 28 yo AAM here today for HIV f/u visit.  He is taking Biktarvy daily, last labs 4/12/23 , 3/9/23 Cd4 54.  He is tolerating Bactrim DS well with Zyrtec daily as well.  His mouth lesions healed with prescribed treatment.  He is breathing well, denies any shortness of breath or activity intolerance. Not sexually active since time of diagnosis.  Not currently taking Vitamin D, states that [facility] did not have it ready for him. He is interested in seeking counseling, will provide with a list of counselors.  He is also considering returning to [facility], [facility]  Encouragement provided.  All questions answered & concerns addressed.         Past Medical History:   Diagnosis Date    Anemia     Anxiety     HIV infection         History reviewed. No pertinent surgical history.     Social  History     Socioeconomic History    Marital status: Single   Tobacco Use    Smoking status: Former     Current packs/day: 0.00     Types: Vaping w/o nicotine    Smokeless tobacco: Never   Substance and Sexual Activity    Alcohol use: Yes     Alcohol/week: 4.0 standard drinks of alcohol     Types: 2 Glasses of wine, 2 Shots of liquor per week     Comment: 2-3 times per week    Drug use: Yes     Types: Marijuana     Comment: Last used last week    Sexual activity: Yes     Partners: Male, Female     Birth control/protection: Condom   Social History Narrative    ** Merged History Encounter **          Social Determinants of Health     Financial Resource Strain: Low Risk  (1/20/2023)    Overall Financial Resource Strain (CARDIA)     Difficulty of Paying Living Expenses: Not hard at all   Food Insecurity: No Food Insecurity (1/20/2023)    Hunger Vital Sign     Worried About Running Out of Food in the Last Year: Never true     Ran Out of Food in the Last Year: Never true   Transportation Needs: No Transportation Needs (1/20/2023)    PRAPARE - Transportation     Lack of Transportation (Medical): No     Lack of Transportation (Non-Medical): No   Physical Activity: Inactive (1/20/2023)    Exercise Vital Sign     Days of Exercise per Week: 0 days     Minutes of Exercise per Session: 0 min   Stress: No Stress Concern Present (1/20/2023)    [employer] [place] [employer] - Occupational Stress Questionnaire     Feeling of Stress : Not at all   Social Connections: Socially Isolated (1/20/2023)    Social Connection and Isolation Panel [NHANES]     Frequency of Communication with Friends and Family: More than three times a week     Frequency of Social Gatherings with Friends and Family: More than three times a week     Attends Gnosticist Services: Never     Active Member of Clubs or Organizations: No     Attends Club or Organization Meetings: Never     Marital Status: Never    Housing Stability: Low Risk   "(1/20/2023)    Housing Stability Vital Sign     Unable to Pay for Housing in the Last Year: No     Number of Places Lived in the Last Year: 1     Unstable Housing in the Last Year: No        Family History   Problem Relation Age of Onset    No Known Problems Mother     Lung cancer Father     Heart attack Father     No Known Problems Sister         Review of patient's allergies indicates:   Allergen Reactions    Latex      Other reaction(s): rash    Latex, natural rubber         Immunization History   Administered Date(s) Administered    COVID-19, MRNA, LN-S, PF (Pfizer) (Purple Cap) 06/23/2021, 07/14/2021    DTP 1995, 1995, 1995, 08/30/1996    DTaP 08/08/2000    HPV Quadrivalent 08/01/2012, 10/18/2012, 01/30/2013    Hep B / HiB 1995, 1995    Hepatitis B, Pediatric/Adolescent 1995    HiB PRP-OMP 1995, 08/30/1996    IPV 08/08/2000    Influenza 10/18/2012    Influenza A (H1N1) 2009 Monovalent - Intranasal 12/05/2009    MMR 08/30/1996, 08/08/2000    MMRV 06/11/2007    Meningococcal Conjugate (MCV4P) 06/11/2007, 08/01/2012    OPV 1995, 1995, 1995, 08/30/1996    Tdap 06/11/2007    Varicella 08/08/2000        Review of Systems   Constitutional: Negative.    HENT: Negative.     Eyes: Negative.    Respiratory: Negative.     Cardiovascular: Negative.    Gastrointestinal: Negative.    Genitourinary: Negative.    Musculoskeletal: Negative.    Skin: Negative.    Neurological: Negative.    Endo/Heme/Allergies: Negative.    Psychiatric/Behavioral: Negative.     All other systems reviewed and are negative.         Objective:      /68 (BP Location: Right arm, Patient Position: Sitting, BP Method: Medium (Automatic))   Pulse 96   Temp 98.4 °F (36.9 °C) (Oral)   Resp 16   Ht 5' 8" (1.727 m)   Wt 58.5 kg (129 lb)   BMI 19.61 kg/m²      Physical Exam  Vitals reviewed.   Constitutional:       General: He is not in acute distress.     Appearance: Normal appearance. He " is not toxic-appearing.   HENT:      Mouth/Throat:      Mouth: Mucous membranes are moist.      Pharynx: Oropharynx is clear.   Eyes:      Conjunctiva/sclera: Conjunctivae normal.   Cardiovascular:      Rate and Rhythm: Normal rate and regular rhythm.   Pulmonary:      Effort: Pulmonary effort is normal. No respiratory distress.      Breath sounds: Normal breath sounds.   Abdominal:      General: Abdomen is flat. Bowel sounds are normal.      Palpations: Abdomen is soft.   Musculoskeletal:         General: Normal range of motion.      Cervical back: Normal range of motion.   Lymphadenopathy:      Cervical: Cervical adenopathy present.      Right cervical: Superficial cervical adenopathy, deep cervical adenopathy and posterior cervical adenopathy present.      Left cervical: Superficial cervical adenopathy, deep cervical adenopathy and posterior cervical adenopathy present.   Skin:     General: Skin is warm and dry.   Neurological:      General: No focal deficit present.      Mental Status: He is alert and oriented to person, place, and time. Mental status is at baseline.   Psychiatric:         Mood and Affect: Mood normal.         Behavior: Behavior normal.          Labs: Reviewed most recent relevant labs available, notable results highlighted in this note    Imaging: Reviewed most recent relevant imaging studies available, notable results highlighted in this note      Medications:     Current Outpatient Medications   Medication Instructions    gjplutnvl-fdefxbcu-karnzkt ala (BIKTARVY) -25 mg (25 kg or greater) 1 tablet, Oral, Daily    cetirizine (ZYRTEC) 10 mg, Oral, Daily    ergocalciferol (ERGOCALCIFEROL) 50,000 Units, Oral, Every 7 days    sulfamethoxazole-trimethoprim 800-160mg (BACTRIM DS) 800-160 mg Tab 1 tablet, Oral, Daily       Assessment:       Problem List Items Addressed This Visit    None  Visit Diagnoses       HIV disease    -  Primary    Relevant Medications    xfnsmqgye-ykuhpkbl-ezpsfug ala  (BIKTARVY) -25 mg (25 kg or greater)    cetirizine (ZYRTEC) 10 MG tablet    Other Relevant Orders    Comprehensive Metabolic Panel    CD4 Lymphocytes    CBC Auto Differential    HIV-1 RNA, Quantitative, PCR with Reflex to Genotype    History of Pneumocystis jirovecii pneumonia        Relevant Medications    sulfamethoxazole-trimethoprim 800-160mg (BACTRIM DS) 800-160 mg Tab    Vitamin D deficiency        Relevant Medications    ergocalciferol (ERGOCALCIFEROL) 50,000 unit Cap    History of syphilis        Relevant Orders    SYPHILIS ANTIBODY (WITH REFLEX RPR)               Plan:      HIV disease  -     hmtggcwxr-hgvvfzva-thwmuom ala (BIKTARVY) -25 mg (25 kg or greater); Take 1 tablet by mouth once daily.  Dispense: 30 tablet; Refill: 4  -     cetirizine (ZYRTEC) 10 MG tablet; Take 1 tablet (10 mg total) by mouth once daily.  Dispense: 90 tablet; Refill: 1  -     Comprehensive Metabolic Panel  -     CD4 Lymphocytes; Future; Expected date: 08/23/2023  -     CBC Auto Differential; Future; Expected date: 08/23/2023  -     HIV-1 RNA, Quantitative, PCR with Reflex to Genotype; Future; Expected date: 08/23/2023  Dx 1/23  CD4 porfirio 8, VL zenith [id number]  Continue Biktarvy 1 po daily as prescribed.  Labs today.  RTC 2 months with Vivi.     HIV Wellness:  Anal pap: N/A  Oral CT/GC: unable to collect s/t gag reflex 2/23  Anal CT/GC: 2/23 Neg  Urine CT/GC: 1/23 Neg  RPR: 12/21 256 dils, 1/23 64 dils, 5/23 16 dils, 8/23  Ophth: appt 6/19/23    History of Pneumocystis jirovecii pneumonia  -     sulfamethoxazole-trimethoprim 800-160mg (BACTRIM DS) 800-160 mg Tab; Take 1 tablet by mouth once daily.  Dispense: 90 tablet; Refill: 1  Completed 21 days of Bactrim DS 2 tabs tid.   Resume secondary prophylaxis with Bactrim DS 1 po daily.  Take with Zyrtec to minimize risk of dermatitis if needed     Vitamin D deficiency  -     ergocalciferol (ERGOCALCIFEROL) 50,000 unit Cap; Take 1 capsule (50,000 Units total) by mouth every 7  days.  Dispense: 12 capsule; Refill: 1  Continue Vitamin D weekly.     History of syphilis  -     SYPHILIS ANTIBODY (WITH REFLEX RPR); Future; Expected date: 08/23/2023  Baseline titer 256 dils 12/21 1/23 64 dils  5/23 16 dils  Repeat titer today.

## 2023-08-24 LAB
AGE: 28
CD3+CD4+ CELLS # SPEC: 198.59 UNIT/L (ref 589–1505)
CD3+CD4+ CELLS NFR BLD: 17.7 %
LYMPHOCYTES # BLD AUTO: 1122 X10(3)/MCL (ref 1260–5520)
LYMPHOCYTES NFR LN MANUAL: 34 % (ref 28–48)
LYMPHOMA - T-CELL MARKERS SPEC-IMP: ABNORMAL
RPR SER QL: REACTIVE
RPR SER-TITR: ABNORMAL {TITER}
WBC # BLD AUTO: 3300 /MM3 (ref 4500–11500)

## 2023-08-27 LAB — HIV1 RNA # PLAS NAA DL=20: 43 COPIES/ML

## 2023-08-28 ENCOUNTER — TELEPHONE (OUTPATIENT)
Dept: INFECTIOUS DISEASES | Facility: CLINIC | Age: 28
End: 2023-08-28
Payer: MEDICAID

## 2023-08-28 NOTE — TELEPHONE ENCOUNTER
----- Message from MAGALIE Cuevas sent at 8/28/2023  8:35 AM CDT -----  Lab results reviewed.  Please call pt with results as requested. Thank you.

## 2023-08-28 NOTE — TELEPHONE ENCOUNTER
Phoned patient. Discussed recent lab results, HIV VL 43, CD4 198 and RPR now at 4 dils. Patient congratulated and encouraged to keep taking medications daily. Voiced understanding and appreciates call.

## 2024-01-04 ENCOUNTER — TELEPHONE (OUTPATIENT)
Dept: INFECTIOUS DISEASES | Facility: CLINIC | Age: 29
End: 2024-01-04
Payer: MEDICAID

## 2024-01-04 DIAGNOSIS — B20 HIV DISEASE: ICD-10-CM

## 2024-01-04 RX ORDER — BICTEGRAVIR SODIUM, EMTRICITABINE, AND TENOFOVIR ALAFENAMIDE FUMARATE 50; 200; 25 MG/1; MG/1; MG/1
1 TABLET ORAL DAILY
Qty: 30 TABLET | Refills: 1 | Status: SHIPPED | OUTPATIENT
Start: 2024-01-04

## 2024-01-04 NOTE — TELEPHONE ENCOUNTER
Last visit with Vivi Salazar, APRN: 8/23/2023  Last visit in [facility] INFECTIOUS DISEASE: 8/23/2023    Patient's next visit in [facility] INFECTIOUS DISEASE: To be scheduled    LL 08/23/2023    Please advise on medication refill

## 2024-05-21 ENCOUNTER — OFFICE VISIT (OUTPATIENT)
Dept: INFECTIOUS DISEASES | Facility: CLINIC | Age: 29
End: 2024-05-21
Payer: MEDICAID

## 2024-05-21 ENCOUNTER — LAB VISIT (OUTPATIENT)
Dept: LAB | Facility: HOSPITAL | Age: 29
End: 2024-05-21
Attending: NURSE PRACTITIONER
Payer: MEDICAID

## 2024-05-21 VITALS
BODY MASS INDEX: 19.26 KG/M2 | RESPIRATION RATE: 17 BRPM | SYSTOLIC BLOOD PRESSURE: 115 MMHG | HEIGHT: 70 IN | DIASTOLIC BLOOD PRESSURE: 75 MMHG | WEIGHT: 134.5 LBS | TEMPERATURE: 98 F | HEART RATE: 69 BPM

## 2024-05-21 DIAGNOSIS — B20 HIV DISEASE: Primary | ICD-10-CM

## 2024-05-21 DIAGNOSIS — Z11.3 ROUTINE SCREENING FOR STI (SEXUALLY TRANSMITTED INFECTION): ICD-10-CM

## 2024-05-21 DIAGNOSIS — Z86.19 HISTORY OF PNEUMOCYSTIS JIROVECII PNEUMONIA: ICD-10-CM

## 2024-05-21 DIAGNOSIS — Z86.19 HISTORY OF SYPHILIS: ICD-10-CM

## 2024-05-21 DIAGNOSIS — B20 HIV DISEASE: ICD-10-CM

## 2024-05-21 DIAGNOSIS — E55.9 VITAMIN D DEFICIENCY: ICD-10-CM

## 2024-05-21 LAB
25(OH)D3+25(OH)D2 SERPL-MCNC: 16 NG/ML (ref 30–80)
ALBUMIN SERPL-MCNC: 3.7 G/DL (ref 3.5–5)
ALBUMIN/GLOB SERPL: 0.8 RATIO (ref 1.1–2)
ALP SERPL-CCNC: 90 UNIT/L (ref 40–150)
ALT SERPL-CCNC: 20 UNIT/L (ref 0–55)
ANION GAP SERPL CALC-SCNC: 7 MEQ/L
AST SERPL-CCNC: 15 UNIT/L (ref 5–34)
BACTERIA #/AREA URNS AUTO: ABNORMAL /HPF
BASOPHILS # BLD AUTO: 0.04 X10(3)/MCL
BASOPHILS NFR BLD AUTO: 0.8 %
BILIRUB SERPL-MCNC: 0.2 MG/DL
BILIRUB UR QL STRIP.AUTO: NEGATIVE
BUN SERPL-MCNC: 10.4 MG/DL (ref 8.9–20.6)
C TRACH DNA SPEC QL NAA+PROBE: NOT DETECTED
C TRACH DNA SPEC QL NAA+PROBE: NOT DETECTED
CALCIUM SERPL-MCNC: 9.3 MG/DL (ref 8.4–10.2)
CHLORIDE SERPL-SCNC: 105 MMOL/L (ref 98–107)
CHOLEST SERPL-MCNC: 190 MG/DL
CHOLEST/HDLC SERPL: 3 {RATIO} (ref 0–5)
CLARITY UR: CLEAR
CO2 SERPL-SCNC: 28 MMOL/L (ref 22–29)
COLOR UR AUTO: COLORLESS
CREAT SERPL-MCNC: 1.01 MG/DL (ref 0.73–1.18)
CREAT/UREA NIT SERPL: 10
EOSINOPHIL # BLD AUTO: 0.11 X10(3)/MCL (ref 0–0.9)
EOSINOPHIL NFR BLD AUTO: 2.1 %
ERYTHROCYTE [DISTWIDTH] IN BLOOD BY AUTOMATED COUNT: 13.8 % (ref 11.5–17)
EST. AVERAGE GLUCOSE BLD GHB EST-MCNC: 93.9 MG/DL
GFR SERPLBLD CREATININE-BSD FMLA CKD-EPI: >60 ML/MIN/1.73/M2
GLOBULIN SER-MCNC: 4.5 GM/DL (ref 2.4–3.5)
GLUCOSE SERPL-MCNC: 85 MG/DL (ref 74–100)
GLUCOSE UR QL STRIP: NORMAL
HBA1C MFR BLD: 4.9 %
HCT VFR BLD AUTO: 43.9 % (ref 42–52)
HCV AB SERPL QL IA: NONREACTIVE
HDLC SERPL-MCNC: 62 MG/DL (ref 35–60)
HGB BLD-MCNC: 14.5 G/DL (ref 14–18)
HGB UR QL STRIP: ABNORMAL
HYALINE CASTS #/AREA URNS LPF: ABNORMAL /LPF
IMM GRANULOCYTES # BLD AUTO: 0.01 X10(3)/MCL (ref 0–0.04)
IMM GRANULOCYTES NFR BLD AUTO: 0.2 %
KETONES UR QL STRIP: NEGATIVE
LDLC SERPL CALC-MCNC: 112 MG/DL (ref 50–140)
LEUKOCYTE ESTERASE UR QL STRIP: NEGATIVE
LYMPHOCYTES # BLD AUTO: 1.94 X10(3)/MCL (ref 0.6–4.6)
LYMPHOCYTES NFR BLD AUTO: 37.1 %
MCH RBC QN AUTO: 32.4 PG (ref 27–31)
MCHC RBC AUTO-ENTMCNC: 33 G/DL (ref 33–36)
MCV RBC AUTO: 98.2 FL (ref 80–94)
MONOCYTES # BLD AUTO: 0.6 X10(3)/MCL (ref 0.1–1.3)
MONOCYTES NFR BLD AUTO: 11.5 %
N GONORRHOEA DNA SPEC QL NAA+PROBE: NOT DETECTED
N GONORRHOEA DNA SPEC QL NAA+PROBE: NOT DETECTED
NEUTROPHILS # BLD AUTO: 2.53 X10(3)/MCL (ref 2.1–9.2)
NEUTROPHILS NFR BLD AUTO: 48.3 %
NITRITE UR QL STRIP: NEGATIVE
NRBC BLD AUTO-RTO: 0 %
PH UR STRIP: 7 [PH]
PLATELET # BLD AUTO: 279 X10(3)/MCL (ref 130–400)
PMV BLD AUTO: 9.6 FL (ref 7.4–10.4)
POTASSIUM SERPL-SCNC: 4.2 MMOL/L (ref 3.5–5.1)
PROT SERPL-MCNC: 8.2 GM/DL (ref 6.4–8.3)
PROT UR QL STRIP: NEGATIVE
RBC # BLD AUTO: 4.47 X10(6)/MCL (ref 4.7–6.1)
RBC #/AREA URNS AUTO: ABNORMAL /HPF
SODIUM SERPL-SCNC: 140 MMOL/L (ref 136–145)
SOURCE (OHS): NORMAL
SOURCE (OHS): NORMAL
SP GR UR STRIP.AUTO: 1.02 (ref 1–1.03)
SQUAMOUS #/AREA URNS LPF: ABNORMAL /HPF
T PALLIDUM AB SER QL: REACTIVE
TRIGL SERPL-MCNC: 81 MG/DL (ref 34–140)
TSH SERPL-ACNC: 1.48 UIU/ML (ref 0.35–4.94)
UROBILINOGEN UR STRIP-ACNC: NORMAL
VLDLC SERPL CALC-MCNC: 16 MG/DL
WBC # SPEC AUTO: 5.23 X10(3)/MCL (ref 4.5–11.5)
WBC #/AREA URNS AUTO: ABNORMAL /HPF

## 2024-05-21 PROCEDURE — 3008F BODY MASS INDEX DOCD: CPT | Mod: CPTII,,, | Performed by: NURSE PRACTITIONER

## 2024-05-21 PROCEDURE — 84443 ASSAY THYROID STIM HORMONE: CPT

## 2024-05-21 PROCEDURE — 87491 CHLMYD TRACH DNA AMP PROBE: CPT | Performed by: NURSE PRACTITIONER

## 2024-05-21 PROCEDURE — 85025 COMPLETE CBC W/AUTO DIFF WBC: CPT

## 2024-05-21 PROCEDURE — 86803 HEPATITIS C AB TEST: CPT

## 2024-05-21 PROCEDURE — 83036 HEMOGLOBIN GLYCOSYLATED A1C: CPT

## 2024-05-21 PROCEDURE — 80061 LIPID PANEL: CPT

## 2024-05-21 PROCEDURE — 86360 T CELL ABSOLUTE COUNT/RATIO: CPT

## 2024-05-21 PROCEDURE — 87536 HIV-1 QUANT&REVRSE TRNSCRPJ: CPT

## 2024-05-21 PROCEDURE — 86592 SYPHILIS TEST NON-TREP QUAL: CPT

## 2024-05-21 PROCEDURE — 86480 TB TEST CELL IMMUN MEASURE: CPT

## 2024-05-21 PROCEDURE — 99213 OFFICE O/P EST LOW 20 MIN: CPT | Mod: PBBFAC | Performed by: NURSE PRACTITIONER

## 2024-05-21 PROCEDURE — 3078F DIAST BP <80 MM HG: CPT | Mod: CPTII,,, | Performed by: NURSE PRACTITIONER

## 2024-05-21 PROCEDURE — 3074F SYST BP LT 130 MM HG: CPT | Mod: CPTII,,, | Performed by: NURSE PRACTITIONER

## 2024-05-21 PROCEDURE — 80053 COMPREHEN METABOLIC PANEL: CPT

## 2024-05-21 PROCEDURE — 82306 VITAMIN D 25 HYDROXY: CPT

## 2024-05-21 PROCEDURE — 86780 TREPONEMA PALLIDUM: CPT

## 2024-05-21 PROCEDURE — 36415 COLL VENOUS BLD VENIPUNCTURE: CPT

## 2024-05-21 PROCEDURE — 99214 OFFICE O/P EST MOD 30 MIN: CPT | Mod: S$PBB,,, | Performed by: NURSE PRACTITIONER

## 2024-05-21 PROCEDURE — 87591 N.GONORRHOEAE DNA AMP PROB: CPT | Performed by: NURSE PRACTITIONER

## 2024-05-21 PROCEDURE — 81001 URINALYSIS AUTO W/SCOPE: CPT | Performed by: NURSE PRACTITIONER

## 2024-05-21 PROCEDURE — 87591 N.GONORRHOEAE DNA AMP PROB: CPT | Mod: 91 | Performed by: NURSE PRACTITIONER

## 2024-05-21 PROCEDURE — 1160F RVW MEDS BY RX/DR IN RCRD: CPT | Mod: CPTII,,, | Performed by: NURSE PRACTITIONER

## 2024-05-21 PROCEDURE — 1159F MED LIST DOCD IN RCRD: CPT | Mod: CPTII,,, | Performed by: NURSE PRACTITIONER

## 2024-05-21 RX ORDER — SULFAMETHOXAZOLE AND TRIMETHOPRIM 800; 160 MG/1; MG/1
1 TABLET ORAL DAILY
Qty: 90 TABLET | Refills: 1 | Status: SHIPPED | OUTPATIENT
Start: 2024-05-21

## 2024-05-21 RX ORDER — BICTEGRAVIR SODIUM, EMTRICITABINE, AND TENOFOVIR ALAFENAMIDE FUMARATE 50; 200; 25 MG/1; MG/1; MG/1
1 TABLET ORAL DAILY
Qty: 30 TABLET | Refills: 3 | Status: SHIPPED | OUTPATIENT
Start: 2024-05-21

## 2024-05-21 RX ORDER — ERGOCALCIFEROL 1.25 MG/1
50000 CAPSULE ORAL
Qty: 12 CAPSULE | Refills: 1 | Status: SHIPPED | OUTPATIENT
Start: 2024-05-21

## 2024-05-21 NOTE — PROGRESS NOTES
Patient ID: Hanh Hassan 28 y.o.     Chief Complaint: No chief complaint on file.       HPI:    5/21/24  Hanh is a 27 yo AAM presenting today for HIV f/u visit.  He takes Biktarvy daily and tolerates it well. Last visit & labs 8/23 VL 43, CD4 199, RPR titer 4 dils. He tells me that he has been taking Biktarvy consistently with only rare missed dose despite missed appts. He missed last appointment due to friend admitted for suicidal ideation & needing to watch the house. He is still taking Bactrim DS daily as well. Last sexual encounter a few weeks ago & did not have latex free condom so did engage in condomless sex. Will collect oral swab for ct/gc, self-collect anal swab for ct/gc, and assess urine as well. History of syphilis, will reassess titer today.  He does have some skin discoloration patches, states that he thinks it is from mosquito bites and does itch. Provider concerned for secondary syphilis. Denies any respiratory complaints. He is still taking vitamin d replacement as ordered, will check level today and continue same. All questions answered & concerns addressed.    8/23/23  Hanh is a 27 yo AAM here today for HIV f/u visit.  He is taking Biktarvy daily & tolerates it well. Will update labs today. Not currently taking Bactrim DS, ran out & needs to  refills.  Stressed importance of doing so for secondary prophylaxis. Voiced understanding. He is feeling well overall.  Denies any sexual encounters since time of diagnosis.  Mood is stable, still contemplating counseling. No questions or concerns today.      6/28/23  Hanh is a 27 yo AAM presenting today for HIV f/u. He takes Biktarvy daily for 3 months now. Labs 5/18/23 VL 54, Cd4 103, RPR titer 16 dils.  He is experiencing hair regrowth and is feeling much better overall.  Denies any sexual encounters. He is now taking the vitamin D weekly as prescribed.  Has not yet sought out counseling, but is still contemplating it.  He is starting to  feel somewhat better emotionally as well, voiced appreciation for support provided in clinic. All questions answered & concerns addressed.           Past Medical History:   Diagnosis Date    Anemia     Anxiety     HIV infection         History reviewed. No pertinent surgical history.     Social History     Socioeconomic History    Marital status: Single   Tobacco Use    Smoking status: Former     Types: Vaping w/o nicotine    Smokeless tobacco: Never   Substance and Sexual Activity    Alcohol use: Yes     Alcohol/week: 4.0 standard drinks of alcohol     Types: 2 Glasses of wine, 2 Shots of liquor per week     Comment: 2-3 times per week    Drug use: Yes     Types: Marijuana    Sexual activity: Yes     Partners: Male, Female     Birth control/protection: Condom   Social History Narrative    ** Merged History Encounter **          Social Determinants of Health     Financial Resource Strain: Low Risk  (1/20/2023)    Overall Financial Resource Strain (CARDIA)     Difficulty of Paying Living Expenses: Not hard at all   Food Insecurity: No Food Insecurity (1/20/2023)    Hunger Vital Sign     Worried About Running Out of Food in the Last Year: Never true     Ran Out of Food in the Last Year: Never true   Transportation Needs: No Transportation Needs (1/20/2023)    PRAPARE - Transportation     Lack of Transportation (Medical): No     Lack of Transportation (Non-Medical): No   Physical Activity: Inactive (1/20/2023)    Exercise Vital Sign     Days of Exercise per Week: 0 days     Minutes of Exercise per Session: 0 min   Stress: No Stress Concern Present (1/20/2023)    [employer] - Occupational Stress Questionnaire     Feeling of Stress : Not at all   Housing Stability: Low Risk  (1/20/2023)    Housing Stability Vital Sign     Unable to Pay for Housing in the Last Year: No     Number of Places Lived in the Last Year: 1     Unstable Housing in the Last Year: No        Family History   Problem  "Relation Name Age of Onset    No Known Problems Mother      Lung cancer Father      Heart attack Father      No Known Problems Sister          Review of patient's allergies indicates:   Allergen Reactions    Latex      Other reaction(s): rash    Latex, natural rubber         Immunization History   Administered Date(s) Administered    COVID-19, MRNA, LN-S, PF (Pfizer) (Purple Cap) 06/23/2021, 07/14/2021    DTP 1995, 1995, 1995, 08/30/1996    DTaP 08/08/2000    HPV Quadrivalent 08/01/2012, 10/18/2012, 01/30/2013    Hep B / HiB 1995, 1995    Hepatitis B, Pediatric/Adolescent 1995    HiB PRP-OMP 1995, 08/30/1996    IPV 08/08/2000    Influenza 10/18/2012    Influenza A (H1N1) 2009 Monovalent - Intranasal 12/05/2009    MMR 08/30/1996, 08/08/2000    MMRV 06/11/2007    Meningococcal Conjugate (MCV4P) 06/11/2007, 08/01/2012    OPV 1995, 1995, 1995, 08/30/1996    Tdap 06/11/2007    Varicella 08/08/2000        Review of Systems   Constitutional: Negative.    HENT: Negative.     Eyes: Negative.    Respiratory: Negative.     Cardiovascular: Negative.    Gastrointestinal: Negative.    Genitourinary: Negative.    Musculoskeletal: Negative.    Skin:  Positive for rash.   Neurological: Negative.    Endo/Heme/Allergies: Negative.    Psychiatric/Behavioral: Negative.     All other systems reviewed and are negative.         Objective:      /75 (BP Location: Left arm, Patient Position: Sitting, BP Method: Medium (Automatic))   Pulse 69   Temp 97.8 °F (36.6 °C)   Resp 17   Ht 5' 10" (1.778 m)   Wt 61 kg (134 lb 7.7 oz)   BMI 19.30 kg/m²      Physical Exam  Vitals reviewed.   Constitutional:       General: He is not in acute distress.     Appearance: Normal appearance. He is not toxic-appearing.   HENT:      Mouth/Throat:      Mouth: Mucous membranes are moist.      Pharynx: Oropharynx is clear.   Eyes:      Conjunctiva/sclera: Conjunctivae normal.   Cardiovascular: "      Rate and Rhythm: Normal rate and regular rhythm.   Pulmonary:      Effort: Pulmonary effort is normal. No respiratory distress.      Breath sounds: Normal breath sounds.   Abdominal:      General: Abdomen is flat. Bowel sounds are normal.      Palpations: Abdomen is soft.   Musculoskeletal:         General: Normal range of motion.      Cervical back: Normal range of motion.   Lymphadenopathy:      Cervical: No cervical adenopathy.   Skin:     General: Skin is warm and dry.      Findings: Rash present.   Neurological:      General: No focal deficit present.      Mental Status: He is alert and oriented to person, place, and time. Mental status is at baseline.   Psychiatric:         Mood and Affect: Mood normal.         Behavior: Behavior normal.          Labs:   Lab Results   Component Value Date    WBC 3.30 (L) 08/23/2023    HGB 14.6 08/23/2023    HCT 44.9 08/23/2023    MCV 96.6 (H) 08/23/2023     08/23/2023       CMP  Sodium   Date Value Ref Range Status   08/23/2023 143 136 - 145 mmol/L Final     Potassium   Date Value Ref Range Status   08/23/2023 3.4 (L) 3.5 - 5.1 mmol/L Final     CO2   Date Value Ref Range Status   08/23/2023 26 22 - 29 mmol/L Final     Blood Urea Nitrogen   Date Value Ref Range Status   08/23/2023 5.8 (L) 8.9 - 20.6 mg/dL Final     Creatinine   Date Value Ref Range Status   08/23/2023 0.92 0.73 - 1.18 mg/dL Final     Calcium   Date Value Ref Range Status   08/23/2023 9.4 8.4 - 10.2 mg/dL Final     Albumin   Date Value Ref Range Status   08/23/2023 3.7 3.5 - 5.0 g/dL Final     Bilirubin Total   Date Value Ref Range Status   08/23/2023 0.2 <=1.5 mg/dL Final     ALP   Date Value Ref Range Status   08/23/2023 100 40 - 150 unit/L Final     AST   Date Value Ref Range Status   08/23/2023 12 5 - 34 unit/L Final     ALT   Date Value Ref Range Status   08/23/2023 10 0 - 55 unit/L Final     eGFR   Date Value Ref Range Status   08/23/2023 >60 mls/min/1.73/m2 Final     Lab Results   Component  Value Date    TSH 1.435 01/18/2023     Hep C Ab Interp   Date Value Ref Range Status   01/18/2023 Nonreactive Nonreactive Final     Syphilis Antibody   Date Value Ref Range Status   08/23/2023 Reactive (A) Nonreactive, Equivocal Final     RPR   Date Value Ref Range Status   08/23/2023 Reactive (A) Non-Reactive Final     RPR Titer   Date Value Ref Range Status   08/23/2023 4 dils (A) (none) Final     Cholesterol Total   Date Value Ref Range Status   01/19/2023 56 <=200 mg/dL Final     Comment:     Fasting     HDL Cholesterol   Date Value Ref Range Status   01/19/2023 11 (L) 35 - 60 mg/dL Final     Comment:     Fasting     Triglyceride   Date Value Ref Range Status   01/19/2023 72 34 - 140 mg/dL Final     Comment:     Fasting     Cholesterol/HDL Ratio   Date Value Ref Range Status   01/19/2023 5 0 - 5 Final     Very Low Density Lipoprotein   Date Value Ref Range Status   01/19/2023 14  Final     LDL Cholesterol   Date Value Ref Range Status   01/19/2023 31.00 (L) 50.00 - 140.00 mg/dL Final     Vitamin D   Date Value Ref Range Status   02/08/2023 14.5 (L) 30.0 - 80.0 ng/mL Final     Results for orders placed or performed in visit on 08/23/23   CD4 Lymphocytes   Result Value Ref Range    Patient Age 28     WBC Absolute 3,300 (L) 4,500 - 11,500 /mm3    Lymph Percent 34 28 - 48 %    Lymph Absolute 1,122 (L) 1,260 - 5,520 x10(3)/mcL    CD4 % 17.7 %    CD4 Absolute 198.594 (L) 589 - 1,505 unit/L    T Cell Interp       Decreased absolute lymphocyte count with decreased absolute CD4+ lymphocyte count.    Addi Dacosta M.D.     Narrative    This test was developed and its performance characteristics determined by [facility]. It has not been cleared or approved by the US Food and Drug Administration. The FDA does not require this test to go through premarket FDA review. This test is used for clinical purposes. It should not be regarded as investigational or for research. This laboratory is  certified under the Clinical Laboratory Improvement Amendments (CLIA) as qualified to perform high complexity clinical laboratory testing.     Results for orders placed or performed in visit on 08/23/23   HIV-1 RNA, Quantitative, PCR with Reflex to Genotype   Result Value Ref Range    HIV-1 RNA Detect/Quant, P 43 (A) Undetected copies/mL     Results for orders placed or performed in visit on 02/08/23   Quantiferon Gold TB   Result Value Ref Range    QuantiFERON-Tb Gold Plus Result Negative Negative    TB1 Ag minus Nil Result 0.01 IU/mL    TB2 Ag minus Nil Result 0.01 IU/mL    Mitogen minus Nil Result 6.82 IU/mL    Nil Result 0.08 IU/mL     No results found for this or any previous visit.  No results found for this or any previous visit.    Imaging: Reviewed most recent relevant imaging studies available, notable results highlighted in this note    Medications:     Current Outpatient Medications   Medication Instructions    nsrhybtkl-mvlomqbz-wcceqso ala (BIKTARVY) -25 mg (25 kg or greater) 1 tablet, Oral, Daily    cetirizine (ZYRTEC) 10 mg, Oral, Daily    ergocalciferol (ERGOCALCIFEROL) 50,000 Units, Oral, Every 7 days    sulfamethoxazole-trimethoprim 800-160mg (BACTRIM DS) 800-160 mg Tab 1 tablet, Oral, Daily       Assessment:       Problem List Items Addressed This Visit    None  Visit Diagnoses       HIV disease    -  Primary    Relevant Medications    hakjgyipl-pmxpdgxc-utafklr ala (BIKTARVY) -25 mg (25 kg or greater)    Other Relevant Orders    Quantiferon Gold TB    Hemoglobin A1C    Hepatitis C Antibody    Vitamin D    TSH    Lipid Panel    Chlamydia/GC, PCR    Urinalysis    Comprehensive Metabolic Panel    CBC Auto Differential    CD4 Lymphocytes    HIV-1 RNA, Quantitative, PCR with Reflex to Genotype    C.trach/N.gonor AMP RNA    Routine screening for STI (sexually transmitted infection)        History of syphilis        Relevant Orders    SYPHILIS ANTIBODY (WITH REFLEX RPR)    Chlamydia/GC, PCR     History of Pneumocystis jirovecii pneumonia        Relevant Medications    sulfamethoxazole-trimethoprim 800-160mg (BACTRIM DS) 800-160 mg Tab    Vitamin D deficiency        Relevant Medications    ergocalciferol (ERGOCALCIFEROL) 50,000 unit Cap               Plan:      HIV disease  -     Quantiferon Gold TB; Future; Expected date: 05/21/2024  -     Hemoglobin A1C; Future; Expected date: 05/21/2024  -     Hepatitis C Antibody; Future; Expected date: 05/21/2024  -     Vitamin D; Future; Expected date: 05/21/2024  -     TSH; Future; Expected date: 05/21/2024  -     Lipid Panel; Future; Expected date: 05/21/2024  -     Chlamydia/GC, PCR  -     Urinalysis  -     Comprehensive Metabolic Panel; Future; Expected date: 05/21/2024  -     CBC Auto Differential; Future; Expected date: 05/21/2024  -     CD4 Lymphocytes; Future; Expected date: 05/21/2024  -     HIV-1 RNA, Quantitative, PCR with Reflex to Genotype; Future; Expected date: 05/21/2024  -     C.trach/N.gonor AMP RNA; Future; Expected date: 05/21/2024  -     jqffjtpks-qumvmyfu-olsoyxf ala (BIKTARVY) -25 mg (25 kg or greater); Take 1 tablet by mouth once daily.  Dispense: 30 tablet; Refill: 3  Dx 1/23  CD4 porfirio 8, VL zenith [id number]  Continue Biktarvy 1 po daily as prescribed.  Labs today.  RTC 3 months with Vivi.     HIV Wellness:  Anal pap: N/A  Oral CT/GC: 5/24  Anal CT/GC: 2/23 Neg  Urine CT/GC: 1/23 Neg  RPR: 12/21 256 dils, 1/23 64 dils, 5/23 16 dils, 8/23 4 dils, 5/24  Ophth: appt 6/19/23    Routine screening for STI (sexually transmitted infection)  Oral & anal swabs, urine collected for ct/gc    History of syphilis  -     SYPHILIS ANTIBODY (WITH REFLEX RPR); Future; Expected date: 05/21/2024  -     Chlamydia/GC, PCR  Baseline titer 256 dils 12/21 1/23 64 dils  5/23 16 dils  8/23 4 dils  Repeat titer today.     History of Pneumocystis jirovecii pneumonia  -     sulfamethoxazole-trimethoprim 800-160mg (BACTRIM DS) 800-160 mg Tab; Take 1 tablet by  mouth once daily.  Dispense: 90 tablet; Refill: 1  Completed 21 days of Bactrim DS 2 tabs tid.   Continue secondary prophylaxis with Bactrim DS 1 po daily.    Vitamin D deficiency  -     ergocalciferol (ERGOCALCIFEROL) 50,000 unit Cap; Take 1 capsule (50,000 Units total) by mouth every 7 days.  Dispense: 12 capsule; Refill: 1  Check level today.  Continue Vitamin D weekly.

## 2024-05-22 LAB
CD3 CELLS # BLD: 1475 CELLS/MCL (ref 550–2202)
CD3 CELLS NFR BLD: 86 % (ref 58–86)
CD3+CD4+ CELLS # BLD: 419 CELLS/MCL (ref 365–1437)
CD3+CD4+ CELLS NFR BLD: 24 % (ref 32–64)
CD3+CD4+ CELLS/CD3+CD8+ CLL BLD: 0.4 %
CD3+CD8+ CELLS # BLD: 944 CELLS/MCL (ref 199–846)
CD3+CD8+ CELLS NFR BLD: 55 % (ref 18–40)
CD45 CELLS # BLD: 1.72 THOU/MCL (ref 0.82–2.84)
RPR SER QL: REACTIVE
RPR SER-TITR: ABNORMAL {TITER}

## 2024-05-23 ENCOUNTER — TELEPHONE (OUTPATIENT)
Dept: INFECTIOUS DISEASES | Facility: CLINIC | Age: 29
End: 2024-05-23
Payer: MEDICAID

## 2024-05-23 LAB
C TRACH RRNA SPEC QL NAA+PROBE: NEGATIVE
GAMMA INTERFERON BACKGROUND BLD IA-ACNC: 0.05 IU/ML
HIV1 RNA # PLAS NAA DL=20: 125 COPIES/ML
M TB IFN-G BLD-IMP: NEGATIVE
M TB IFN-G CD4+ BCKGRND COR BLD-ACNC: 0.01 IU/ML
M TB IFN-G CD4+CD8+ BCKGRND COR BLD-ACNC: 0.02 IU/ML
MITOGEN IGNF BCKGRD COR BLD-ACNC: >10 IU/ML
N GONORRHOEA RRNA SPEC QL NAA+PROBE: NEGATIVE
SPECIMEN SOURCE: NORMAL
SPECIMEN SOURCE: NORMAL

## 2024-05-23 NOTE — TELEPHONE ENCOUNTER
Lab results reviewed. VL increased to 125.  Please call pt with results and encourage him to avoid missing any doses of Biktarvy. A pill box or reminder on his phone may be helpful. Thank you.

## 2024-05-23 NOTE — TELEPHONE ENCOUNTER
Patient phoned the clinic back. Discussed Lab results reviewed. VL increased to 125. Encouraged him to avoid missing any doses of Biktarvy. A pill box or reminder on his phone may be helpful. Voiced understanding and appreciates call.

## 2024-08-22 ENCOUNTER — OFFICE VISIT (OUTPATIENT)
Dept: INFECTIOUS DISEASES | Facility: CLINIC | Age: 29
End: 2024-08-22
Payer: MEDICAID

## 2024-08-22 ENCOUNTER — LAB VISIT (OUTPATIENT)
Dept: LAB | Facility: HOSPITAL | Age: 29
End: 2024-08-22
Attending: NURSE PRACTITIONER
Payer: MEDICAID

## 2024-08-22 VITALS
BODY MASS INDEX: 19.22 KG/M2 | RESPIRATION RATE: 12 BRPM | HEART RATE: 81 BPM | DIASTOLIC BLOOD PRESSURE: 64 MMHG | TEMPERATURE: 98 F | SYSTOLIC BLOOD PRESSURE: 109 MMHG | WEIGHT: 134.25 LBS | HEIGHT: 70 IN

## 2024-08-22 DIAGNOSIS — Z86.19 HISTORY OF PNEUMOCYSTIS JIROVECII PNEUMONIA: ICD-10-CM

## 2024-08-22 DIAGNOSIS — B20 HIV DISEASE: Primary | ICD-10-CM

## 2024-08-22 DIAGNOSIS — Z86.19 HISTORY OF SYPHILIS: ICD-10-CM

## 2024-08-22 DIAGNOSIS — E55.9 VITAMIN D DEFICIENCY: ICD-10-CM

## 2024-08-22 DIAGNOSIS — B20 HIV DISEASE: ICD-10-CM

## 2024-08-22 DIAGNOSIS — Z23 NEED FOR VACCINATION: ICD-10-CM

## 2024-08-22 LAB
ALBUMIN SERPL-MCNC: 3.8 G/DL (ref 3.5–5)
ALBUMIN/GLOB SERPL: 0.8 RATIO (ref 1.1–2)
ALP SERPL-CCNC: 83 UNIT/L (ref 40–150)
ALT SERPL-CCNC: 9 UNIT/L (ref 0–55)
ANION GAP SERPL CALC-SCNC: 5 MEQ/L
AST SERPL-CCNC: 12 UNIT/L (ref 5–34)
BASOPHILS # BLD AUTO: 0.02 X10(3)/MCL
BASOPHILS NFR BLD AUTO: 0.5 %
BILIRUB SERPL-MCNC: 0.2 MG/DL
BUN SERPL-MCNC: 6.3 MG/DL (ref 8.9–20.6)
CALCIUM SERPL-MCNC: 10.1 MG/DL (ref 8.4–10.2)
CHLORIDE SERPL-SCNC: 105 MMOL/L (ref 98–107)
CO2 SERPL-SCNC: 30 MMOL/L (ref 22–29)
CREAT SERPL-MCNC: 1.02 MG/DL (ref 0.73–1.18)
CREAT/UREA NIT SERPL: 6
EOSINOPHIL # BLD AUTO: 0.04 X10(3)/MCL (ref 0–0.9)
EOSINOPHIL NFR BLD AUTO: 1.1 %
ERYTHROCYTE [DISTWIDTH] IN BLOOD BY AUTOMATED COUNT: 13.3 % (ref 11.5–17)
GFR SERPLBLD CREATININE-BSD FMLA CKD-EPI: >60 ML/MIN/1.73/M2
GLOBULIN SER-MCNC: 4.5 GM/DL (ref 2.4–3.5)
GLUCOSE SERPL-MCNC: 56 MG/DL (ref 74–100)
HCT VFR BLD AUTO: 44.1 % (ref 42–52)
HGB BLD-MCNC: 14.4 G/DL (ref 14–18)
IMM GRANULOCYTES # BLD AUTO: 0 X10(3)/MCL (ref 0–0.04)
IMM GRANULOCYTES NFR BLD AUTO: 0 %
LYMPHOCYTES # BLD AUTO: 1.92 X10(3)/MCL (ref 0.6–4.6)
LYMPHOCYTES NFR BLD AUTO: 52.2 %
MCH RBC QN AUTO: 31 PG (ref 27–31)
MCHC RBC AUTO-ENTMCNC: 32.7 G/DL (ref 33–36)
MCV RBC AUTO: 95 FL (ref 80–94)
MONOCYTES # BLD AUTO: 0.33 X10(3)/MCL (ref 0.1–1.3)
MONOCYTES NFR BLD AUTO: 9 %
NEUTROPHILS # BLD AUTO: 1.37 X10(3)/MCL (ref 2.1–9.2)
NEUTROPHILS NFR BLD AUTO: 37.2 %
NRBC BLD AUTO-RTO: 0 %
PLATELET # BLD AUTO: 321 X10(3)/MCL (ref 130–400)
PMV BLD AUTO: 9.3 FL (ref 7.4–10.4)
POTASSIUM SERPL-SCNC: 4.1 MMOL/L (ref 3.5–5.1)
PROT SERPL-MCNC: 8.3 GM/DL (ref 6.4–8.3)
RBC # BLD AUTO: 4.64 X10(6)/MCL (ref 4.7–6.1)
SODIUM SERPL-SCNC: 140 MMOL/L (ref 136–145)
WBC # BLD AUTO: 3.68 X10(3)/MCL (ref 4.5–11.5)

## 2024-08-22 PROCEDURE — 90677 PCV20 VACCINE IM: CPT | Mod: PBBFAC

## 2024-08-22 PROCEDURE — 85025 COMPLETE CBC W/AUTO DIFF WBC: CPT

## 2024-08-22 PROCEDURE — 36415 COLL VENOUS BLD VENIPUNCTURE: CPT

## 2024-08-22 PROCEDURE — 90472 IMMUNIZATION ADMIN EACH ADD: CPT | Mod: PBBFAC

## 2024-08-22 PROCEDURE — 99213 OFFICE O/P EST LOW 20 MIN: CPT | Mod: PBBFAC | Performed by: NURSE PRACTITIONER

## 2024-08-22 PROCEDURE — 90734 MENACWYD/MENACWYCRM VACC IM: CPT | Mod: PBBFAC

## 2024-08-22 PROCEDURE — 87536 HIV-1 QUANT&REVRSE TRNSCRPJ: CPT

## 2024-08-22 PROCEDURE — 80053 COMPREHEN METABOLIC PANEL: CPT

## 2024-08-22 PROCEDURE — 90471 IMMUNIZATION ADMIN: CPT | Mod: PBBFAC

## 2024-08-22 PROCEDURE — 86359 T CELLS TOTAL COUNT: CPT

## 2024-08-22 RX ORDER — SULFAMETHOXAZOLE AND TRIMETHOPRIM 800; 160 MG/1; MG/1
1 TABLET ORAL DAILY
Qty: 90 TABLET | Refills: 1 | Status: CANCELLED | OUTPATIENT
Start: 2024-08-22

## 2024-08-22 RX ORDER — BICTEGRAVIR SODIUM, EMTRICITABINE, AND TENOFOVIR ALAFENAMIDE FUMARATE 50; 200; 25 MG/1; MG/1; MG/1
1 TABLET ORAL DAILY
Qty: 30 TABLET | Refills: 3 | Status: SHIPPED | OUTPATIENT
Start: 2024-08-22

## 2024-08-22 RX ADMIN — PNEUMOCOCCAL 20-VALENT CONJUGATE VACCINE 0.5 ML
2.2; 2.2; 2.2; 2.2; 2.2; 2.2; 2.2; 2.2; 2.2; 2.2; 2.2; 2.2; 2.2; 2.2; 2.2; 2.2; 4.4; 2.2; 2.2; 2.2 INJECTION, SUSPENSION INTRAMUSCULAR at 02:08

## 2024-08-22 RX ADMIN — MENINGOCOCCAL (GROUPS A, C, Y AND W-135) OLIGOSACCHARIDE DIPHTHERIA CRM197 CONJUGATE VACCINE 0.5 ML: 10; 5; 5; 5 INJECTION, SOLUTION INTRAMUSCULAR at 02:08

## 2024-08-22 NOTE — PROGRESS NOTES
Patient ID: Hanh Hassan 29 y.o.     Chief Complaint:   Chief Complaint   Patient presents with    Followup HIV     Denies problems        HPI:    8/22/24  Hanh is a 30 yo AAM here today for HIV f/u visit.  He is taking Biktarvy every day and is tolerating it well. Labs 5/24 , Cd4 419, RPR titer 4 dils. He states that he is doing much better with Biktarvy, no longer missing doses & is taking Bactrim DS as well. He tells me that he has not been sexually active, no longer interested in hanging out with the old crowd as he is not interested in partaking of those things. He is taking weekly vitamin d as prescribed. Declines need for repeat STI screenings today. Ready to start receiving recommended vaccinations. Will start with PCV 20 & Menveo #1 today. Heplisav-B & TDAP to follow.  All questions answered & concerns addressed.    5/21/24  Hanh is a 29 yo AAM presenting today for HIV f/u visit.  He takes Biktarvy daily and tolerates it well. Last visit & labs 8/23 VL 43, CD4 199, RPR titer 4 dils. He tells me that he has been taking Biktarvy consistently with only rare missed dose despite missed appts. He missed last appointment due to friend admitted for suicidal ideation & needing to watch the house. He is still taking Bactrim DS daily as well. Last sexual encounter a few weeks ago & did not have latex free condom so did engage in condomless sex. Will collect oral swab for ct/gc, self-collect anal swab for ct/gc, and assess urine as well. History of syphilis, will reassess titer today.  He does have some skin discoloration patches, states that he thinks it is from mosquito bites and does itch. Provider concerned for secondary syphilis. Denies any respiratory complaints. He is still taking vitamin d replacement as ordered, will check level today and continue same. All questions answered & concerns addressed.     8/23/23  Hanh is a 29 yo AAM here today for HIV f/u visit.  He is taking Biktarvy daily &  tolerates it well. Will update labs today. Not currently taking Bactrim DS, ran out & needs to  refills.  Stressed importance of doing so for secondary prophylaxis. Voiced understanding. He is feeling well overall.  Denies any sexual encounters since time of diagnosis.  Mood is stable, still contemplating counseling. No questions or concerns today.               Past Medical History:   Diagnosis Date    Anemia     Anxiety     HIV infection         History reviewed. No pertinent surgical history.     Social History     Socioeconomic History    Marital status: Single   Tobacco Use    Smoking status: Former     Types: Vaping w/o nicotine    Smokeless tobacco: Never   Substance and Sexual Activity    Alcohol use: Yes     Alcohol/week: 4.0 standard drinks of alcohol     Types: 2 Glasses of wine, 2 Shots of liquor per week     Comment: social    Drug use: Yes     Types: Marijuana     Comment: social    Sexual activity: Yes     Partners: Male, Female     Birth control/protection: Condom, None   Social History Narrative    ** Merged History Encounter **          Social Determinants of Health     Financial Resource Strain: Low Risk  (1/20/2023)    Overall Financial Resource Strain (CARDIA)     Difficulty of Paying Living Expenses: Not hard at all   Food Insecurity: No Food Insecurity (1/20/2023)    Hunger Vital Sign     Worried About Running Out of Food in the Last Year: Never true     Ran Out of Food in the Last Year: Never true   Transportation Needs: No Transportation Needs (1/20/2023)    PRAPARE - Transportation     Lack of Transportation (Medical): No     Lack of Transportation (Non-Medical): No   Physical Activity: Inactive (1/20/2023)    Exercise Vital Sign     Days of Exercise per Week: 0 days     Minutes of Exercise per Session: 0 min   Stress: No Stress Concern Present (1/20/2023)    [employer] - Occupational Stress Questionnaire     Feeling of Stress : Not at all   Housing  "Stability: Low Risk  (1/20/2023)    Housing Stability Vital Sign     Unable to Pay for Housing in the Last Year: No     Number of Places Lived in the Last Year: 1     Unstable Housing in the Last Year: No        Family History   Problem Relation Name Age of Onset    No Known Problems Mother      Lung cancer Father      Heart attack Father      No Known Problems Sister          Review of patient's allergies indicates:   Allergen Reactions    Latex      Other reaction(s): rash    Latex, natural rubber         Immunization History   Administered Date(s) Administered    COVID-19, MRNA, LN-S, PF (Pfizer) (Purple Cap) 06/23/2021, 07/14/2021    DTP 1995, 1995, 1995, 08/30/1996    DTaP 08/08/2000    HPV Quadrivalent 08/01/2012, 10/18/2012, 01/30/2013    Hep B / HiB 1995, 1995    Hepatitis B, Pediatric/Adolescent 1995    HiB PRP-OMP 1995, 08/30/1996    IPV 08/08/2000    Influenza 10/18/2012    Influenza A (H1N1) 2009 Monovalent - Intranasal 12/05/2009    MMR 08/30/1996, 08/08/2000    MMRV 06/11/2007    Meningococcal Conjugate (MCV4O) 1 Vial Dose(10yr-55yr) 08/22/2024    Meningococcal Conjugate (MCV4P) 06/11/2007, 08/01/2012    OPV 1995, 1995, 1995, 08/30/1996    Pneumococcal Conjugate - 20 Valent 08/22/2024    Tdap 06/11/2007    Varicella 08/08/2000        Review of Systems   Constitutional: Negative.    HENT: Negative.     Eyes: Negative.    Respiratory: Negative.     Cardiovascular: Negative.    Gastrointestinal: Negative.    Genitourinary: Negative.    Musculoskeletal: Negative.    Skin: Negative.    Neurological: Negative.    Endo/Heme/Allergies: Negative.    Psychiatric/Behavioral: Negative.     All other systems reviewed and are negative.         Objective:      /64 (BP Location: Left arm, Patient Position: Sitting, BP Method: Medium (Automatic))   Pulse 81   Temp 98.2 °F (36.8 °C) (Oral)   Resp 12   Ht 5' 10" (1.778 m)   Wt 60.9 kg (134 lb 4.2 oz)  "  BMI 19.26 kg/m²      Physical Exam  Vitals reviewed.   Constitutional:       General: He is not in acute distress.     Appearance: Normal appearance. He is not toxic-appearing.   HENT:      Mouth/Throat:      Mouth: Mucous membranes are moist.      Pharynx: Oropharynx is clear.   Eyes:      Conjunctiva/sclera: Conjunctivae normal.   Cardiovascular:      Rate and Rhythm: Normal rate and regular rhythm.   Pulmonary:      Effort: Pulmonary effort is normal. No respiratory distress.      Breath sounds: Normal breath sounds.   Abdominal:      General: Abdomen is flat. Bowel sounds are normal.      Palpations: Abdomen is soft.   Musculoskeletal:         General: Normal range of motion.      Cervical back: Normal range of motion.   Lymphadenopathy:      Cervical: No cervical adenopathy.   Skin:     General: Skin is warm and dry.   Neurological:      General: No focal deficit present.      Mental Status: He is alert and oriented to person, place, and time. Mental status is at baseline.   Psychiatric:         Mood and Affect: Mood normal.         Behavior: Behavior normal.          Labs:   Lab Results   Component Value Date    WBC 3.68 (L) 08/22/2024    HGB 14.4 08/22/2024    HCT 44.1 08/22/2024    MCV 95.0 (H) 08/22/2024     08/22/2024       CMP  Sodium   Date Value Ref Range Status   05/21/2024 140 136 - 145 mmol/L Final     Potassium   Date Value Ref Range Status   05/21/2024 4.2 3.5 - 5.1 mmol/L Final     Chloride   Date Value Ref Range Status   05/21/2024 105 98 - 107 mmol/L Final     CO2   Date Value Ref Range Status   05/21/2024 28 22 - 29 mmol/L Final     Blood Urea Nitrogen   Date Value Ref Range Status   05/21/2024 10.4 8.9 - 20.6 mg/dL Final     Creatinine   Date Value Ref Range Status   05/21/2024 1.01 0.73 - 1.18 mg/dL Final     Calcium   Date Value Ref Range Status   05/21/2024 9.3 8.4 - 10.2 mg/dL Final     Albumin   Date Value Ref Range Status   05/21/2024 3.7 3.5 - 5.0 g/dL Final     Bilirubin Total    Date Value Ref Range Status   05/21/2024 0.2 <=1.5 mg/dL Final     ALP   Date Value Ref Range Status   05/21/2024 90 40 - 150 unit/L Final     AST   Date Value Ref Range Status   05/21/2024 15 5 - 34 unit/L Final     ALT   Date Value Ref Range Status   05/21/2024 20 0 - 55 unit/L Final     eGFR   Date Value Ref Range Status   05/21/2024 >60 mL/min/1.73/m2 Final     Lab Results   Component Value Date    TSH 1.480 05/21/2024     Hep C Ab Interp   Date Value Ref Range Status   05/21/2024 Nonreactive Nonreactive Final     Syphilis Antibody   Date Value Ref Range Status   05/21/2024 Reactive (A) Nonreactive, Equivocal Final     RPR   Date Value Ref Range Status   05/21/2024 Reactive (A) Non-Reactive Final     RPR Titer   Date Value Ref Range Status   05/21/2024 4 dils (A) (none) Final     Cholesterol Total   Date Value Ref Range Status   05/21/2024 190 <=200 mg/dL Final     HDL Cholesterol   Date Value Ref Range Status   05/21/2024 62 (H) 35 - 60 mg/dL Final     Triglyceride   Date Value Ref Range Status   05/21/2024 81 34 - 140 mg/dL Final     Cholesterol/HDL Ratio   Date Value Ref Range Status   05/21/2024 3 0 - 5 Final     Very Low Density Lipoprotein   Date Value Ref Range Status   05/21/2024 16  Final     LDL Cholesterol   Date Value Ref Range Status   05/21/2024 112.00 50.00 - 140.00 mg/dL Final     Vitamin D   Date Value Ref Range Status   05/21/2024 16 (L) 30 - 80 ng/mL Final     Results for orders placed or performed in visit on 08/23/23   CD4 Lymphocytes   Result Value Ref Range    Patient Age 28     WBC Absolute 3,300 (L) 4,500 - 11,500 /mm3    Lymph Percent 34 28 - 48 %    Lymph Absolute 1,122 (L) 1,260 - 5,520 x10(3)/mcL    CD4 % 17.7 %    CD4 Absolute 198.594 (L) 589 - 1,505 unit/L    T Cell Interp       Decreased absolute lymphocyte count with decreased absolute CD4+ lymphocyte count.    Addi Dacosta M.D.     Narrative    This test was developed and its performance characteristics determined by  [facility]. It has not been cleared or approved by the US Food and Drug Administration. The FDA does not require this test to go through premarket FDA review. This test is used for clinical purposes. It should not be regarded as investigational or for research. This laboratory is certified under the Clinical Laboratory Improvement Amendments (CLIA) as qualified to perform high complexity clinical laboratory testing.     Results for orders placed or performed in visit on 05/21/24   HIV-1 RNA, Quantitative, PCR with Reflex to Genotype   Result Value Ref Range    HIV-1 RNA Detect/Quant, P 125 (A) Undetected copies/mL     Results for orders placed or performed in visit on 05/21/24   Quantiferon Gold TB   Result Value Ref Range    QuantiFERON-Tb Gold Plus Result Negative Negative    TB1 Ag minus Nil Result 0.01 IU/mL    TB2 Ag minus Nil Result 0.02 IU/mL    Mitogen minus Nil Result >10.00 IU/mL    Nil Result 0.05 IU/mL     Results for orders placed or performed in visit on 05/21/24   C.trach/N.gonor AMP RNA    Specimen: Throat   Result Value Ref Range    Source THROAT     Chlamydia trachomatis amplified RNA Negative Negative    Neisseria gonorrhoeae amplified RNA Negative Negative    SOURCE: THROAT      Results for orders placed or performed in visit on 05/21/24   Urinalysis   Result Value Ref Range    Color, UA Colorless (A) Yellow, Light-Yellow, Dark Yellow, Brandi, Straw    Appearance, UA Clear Clear    Specific Gravity, UA 1.016 1.005 - 1.030    pH, UA 7.0 5.0 - 8.5    Protein, UA Negative Negative    Glucose, UA Normal Negative, Normal    Ketones, UA Negative Negative    Blood, UA 1+ (A) Negative    Bilirubin, UA Negative Negative    Urobilinogen, UA Normal 0.2, 1.0, Normal    Nitrites, UA Negative Negative    Leukocyte Esterase, UA Negative Negative    WBC, UA 0-5 None Seen, 0-2, 3-5, 0-5 /HPF    Bacteria, UA None Seen None Seen /HPF    Squamous Epithelial Cells, UA None Seen None Seen  /HPF    Hyaline Casts, UA None Seen None Seen /lpf    RBC, UA 0-5 None Seen, 0-2, 3-5, 0-5 /HPF       Imaging: Reviewed most recent relevant imaging studies available, notable results highlighted in this note    Medications:     Current Outpatient Medications   Medication Instructions    qqjbecmzw-mogztssk-clougfq ala (BIKTARVY) -25 mg (25 kg or greater) 1 tablet, Oral, Daily    cetirizine (ZYRTEC) 10 mg, Oral, Daily    ergocalciferol (ERGOCALCIFEROL) 50,000 Units, Oral, Every 7 days    sulfamethoxazole-trimethoprim 800-160mg (BACTRIM DS) 800-160 mg Tab 1 tablet, Oral, Daily       Assessment:       Problem List Items Addressed This Visit    None  Visit Diagnoses       HIV disease    -  Primary    Relevant Medications    sjubfdvvt-wicougnt-kojjpiz ala (BIKTARVY) -25 mg (25 kg or greater)    Other Relevant Orders    CD4 T-Liberal Cells    Comprehensive Metabolic Panel    CBC Auto Differential (Completed)    HIV-1 RNA, Quantitative, PCR with Reflex to Genotype    History of Pneumocystis jirovecii pneumonia        Need for vaccination        Relevant Medications    pneumoc 20-damion conj-dip cr(PF) (PREVNAR-20 (PF)) injection Syrg 0.5 mL (Completed)    mening vac A,C,Y,W135 dip (PF) (MENVEO) 10-5 mcg/0.5 mL vaccine (PREFERRED)(10 - 54 YO) 0.5 mL (Completed)    History of syphilis        Vitamin D deficiency                   Plan:      HIV disease  -     fffqloowz-lmhsmkow-cfrphhp ala (BIKTARVY) -25 mg (25 kg or greater); Take 1 tablet by mouth once daily.  Dispense: 30 tablet; Refill: 3  -     CD4 T-Liberal Cells; Future; Expected date: 08/22/2024  -     Comprehensive Metabolic Panel; Future; Expected date: 08/22/2024  -     CBC Auto Differential; Future; Expected date: 08/22/2024  -     HIV-1 RNA, Quantitative, PCR with Reflex to Genotype; Future; Expected date: 08/22/2024  Dx 1/23  CD4 porfirio 8, VL zenith [id number]    Adherence and sexual health counseling done.  Use condoms for all sexual encounters.  Blood  precautions.   Continue Biktarvy 1 po daily as prescribed.  Labs today.  RTC 3 months with Vivi.     HIV Wellness:  Anal pap: N/A  Oral CT/GC: 5/24 Neg  Anal CT/GC: 5/24 Neg  Urine CT/GC: 5/24 Neg  RPR: 12/21 256 dils, 1/23 64 dils, 5/23 16 dils, 8/23 4 dils, 5/24 2 dils  Ophth: 6/19/23    History of Pneumocystis jirovecii pneumonia  Completed 21 days of Bactrim DS 2 tabs tid.   Continue secondary prophylaxis with Bactrim DS 1 po daily.  Will check CD4 count today and discontinue if CD4 remains >200 x 6 months.     Need for vaccination  -     Discontinue: meningococ vac A,C,Y,W135 dip (PF) 10-5 mcg/0.5 mL injection (2 MO - 56 YO) 0.5 mL  -     pneumoc 20-damion conj-dip cr(PF) (PREVNAR-20 (PF)) injection Syrg 0.5 mL  -     mening vac A,C,Y,W135 dip (PF) (MENVEO) 10-5 mcg/0.5 mL vaccine (PREFERRED)(10 - 56 YO) 0.5 mL  Menveo #1 today, #2 next visit.   PCV 20 today.   Heplisav-B #1 next visit, #2 to follow.  TDAP to follow.     History of syphilis  Baseline titer 256 dils 12/21  Bicillin 2.4 mil units IM 12/21 1/23 64 dils  Bicillin 7.2 mil units IM 2/23 5/23 16 dils  8/23 4 dils  5/24 4 dils.   Repeat titer annually & PRN.     Vitamin D deficiency  Continue Vitamin D2 09484 units weekly.

## 2024-08-22 NOTE — PROGRESS NOTES
Menveo and Pneumonia Vaccinations given IM left deltoid using aseptic tech. Patient tolerated well. To contact clinic prn.

## 2024-08-25 LAB
CD3 CELLS # BLD: 1443 CELLS/MCL (ref 550–2202)
CD3 CELLS NFR BLD: 87 % (ref 58–86)
CD3+CD4+ CELLS # BLD: 398 CELLS/MCL (ref 365–1437)
CD3+CD4+ CELLS NFR BLD: 24 % (ref 32–64)
CD3+CD4+ CELLS/CD3+CD8+ CLL BLD: 0.4 %
CD3+CD8+ CELLS # BLD: 933 CELLS/MCL (ref 199–846)
CD3+CD8+ CELLS NFR BLD: 56 % (ref 18–40)
CD45 CELLS # BLD: 1.67 THOU/MCL (ref 0.82–2.84)
HIV1 RNA # PLAS NAA DL=20: 50 COPIES/ML

## 2024-08-26 ENCOUNTER — TELEPHONE (OUTPATIENT)
Dept: INFECTIOUS DISEASES | Facility: CLINIC | Age: 29
End: 2024-08-26
Payer: MEDICAID

## 2024-08-26 NOTE — TELEPHONE ENCOUNTER
Phoned patient. Informed of lab results reviewed. CD4 count 398. He can now safely stop taking Bactrim DS. Voiced understanding and appreciates call.

## 2024-08-26 NOTE — TELEPHONE ENCOUNTER
Lab results reviewed.  CD4 count 398.  He can now safely stop taking Bactrim DS.  Please notify pt. Thank you.

## 2024-10-23 NOTE — PROGRESS NOTES
Pharmacokinetic Assessment Follow Up: IV Vancomycin    Vancomycin serum concentration assessment(s):    The trough level was drawn correctly and can be used to guide therapy at this time. The measurement is below the desired definitive target range of 15 to 20 mcg/mL.    Vancomycin Regimen Plan:    Change regimen to Vancomycin 1000 mg IV every 12 hours with next serum trough concentration measured at 1500 prior to 3rd dose on 1/21/23.    Drug levels (last 3 results):  Recent Labs   Lab Result Units 01/19/23  1509 01/20/23  1456   Vancomycin Trough ug/ml 12.8* 13.6*       Pharmacy will continue to follow and monitor vancomycin.    Please contact pharmacy at extension [phone] for questions regarding this assessment.    Thank you for the consult,   Amanda Bee       Patient brief summary:  Hanh Hassan is a 27 y.o. male initiated on antimicrobial therapy with IV Vancomycin for treatment of lower respiratory infection    The patient's current regimen is Vancomycin 1000 mg Q 12H.     Drug Allergies:   Review of patient's allergies indicates:   Allergen Reactions    Latex      Other reaction(s): rash    Latex, natural rubber        Actual Body Weight:   50 kg    Renal Function:   Estimated Creatinine Clearance: 99.3 mL/min (based on SCr of 0.79 mg/dL).,     Dialysis Method (if applicable):  N/A    CBC (last 72 hours):  Recent Labs   Lab Result Units 01/18/23  1216 01/18/23  1643 01/18/23  1804 01/19/23  0251 01/19/23  1811 01/20/23  0323   WBC x10(3)/mcL 1.3*  --  1.1* 1.1* 2.0* 1.5*   Hgb gm/dL 8.3*  --  7.2* 6.7* 11.0* 10.6*   Hemoglobin A1c %  --  5.5  --   --   --   --    Hct % 26.2*  --  22.9* 20.7* 32.7* 31.8*   Platelet x10(3)/mcL 268  --  220 192 199 182   Mono % % 25.6  --   --   --   --  21.9   Monocyte Man %  --   --  31* 9 28*  --    Eos % % 0.0  --   --   --   --  0.0   Eos Man %  --   --   --  4  --   --    Basophil % % 0.0  --   --   --   --  0.7       Metabolic Panel (last 72 hours):  Recent Labs   Lab  Detail Level: Detailed Result Units 01/18/23  1216 01/18/23  1232 01/18/23  1643 01/19/23  0251 01/20/23  0323   Sodium Level mmol/L 131*  --   --  133* 132*   Urine Sodium mmol/L  --  <20.0  --   --   --    Potassium Level mmol/L 4.6  --   --  3.1* 2.9*   Chloride mmol/L 96*  --   --  100 99   Carbon Dioxide mmol/L 21*  --   --  24 22   Glucose Level mg/dL 148*  --   --  126* 124*   Glucose, UA mg/dL  --  Normal  --   --   --    Blood Urea Nitrogen mg/dL 12.7  --   --  8.2* 6.0*   Creatinine mg/dL 1.01  --   --  0.67* 0.79   Albumin Level g/dL 2.4*  --   --  1.6* 1.7*   Bilirubin Total mg/dL 0.9  --   --  0.4 0.3   Alkaline Phosphatase unit/L 308*  --   --  215* 208*   Aspartate Aminotransferase unit/L 28  --   --  21 20   Alanine Aminotransferase unit/L 20  --   --  13 13   Magnesium Level mg/dL 2.00  --  1.80 2.00 2.00   Phosphorus Level mg/dL 2.2*  --  3.2 3.2 2.1*       Vancomycin Administrations:  vancomycin given in the last 96 hours                     vancomycin 750 mg in sodium chloride 0.9% 250 mL IVPB (mg) 750 mg New Bag 01/20/23 0413     750 mg New Bag 01/19/23 1733     750 mg New Bag  0430    vancomycin (VANCOCIN) 1,250 mg in sodium chloride 0.9% 250 mL IVPB (mg) 1,250 mg New Bag 01/18/23 1551                    Microbiologic Results:  Microbiology Results (last 7 days)       Procedure Component Value Units Date/Time    Respiratory Culture [[id number]]  (Abnormal) Collected: 01/18/23 1608    Order Status: Completed Specimen: Sputum, Expectorated Updated: 01/20/23 0754     Respiratory Culture Moderate Yeast     Comment: with normal respiratory shree        GRAM STAIN Quality 1+      Many Gram positive cocci      Many Gram Negative Rods      Many Gram Positive Rods      Few Yeast    Urine culture [[id number]] Collected: 01/18/23 1232    Order Status: Completed Specimen: Urine Updated: 01/20/23 0719     Urine Culture No Significant Growth    Chlamydia/GC, PCR [[id number]]  (Normal) Collected: 01/19/23 2235    Order Status:  Completed Specimen: Urine Updated: 01/20/23 0215     Chlamydia trachomatis PCR Not Detected     N. gonorrhea PCR Not Detected    Narrative:      The Xpert CT/NG test, performed on the GeneXpert system is a qualitative in vitro real-time polymerase chain reaction (PCR) test for the automated detected and differentiation for genomic DNA from Chlamydia trachomatis (CT) and/or Neisseria gonorrhoeae (NG).    Blood Culture [[id number]]  (Normal) Collected: 01/18/23 1425    Order Status: Completed Specimen: Blood from Forearm, Right Updated: 01/19/23 1901     CULTURE, BLOOD ([facility]) No Growth At 24 Hours    Blood Culture [[id number]]  (Normal) Collected: 01/18/23 1402    Order Status: Completed Specimen: Blood from Arm, Right Updated: 01/19/23 1901     CULTURE, BLOOD ([facility]) No Growth At 24 Hours    Cryptococcal antigen, blood [[id number]] Collected: 01/18/23 1916    Order Status: Completed Specimen: Blood, Venous Updated: 01/18/23 1937     CRYPTOCOCCAL ANTIGEN, SERUM ([facility]) Negative     CRYPTOCOCCAL ANTIGEN TITER ([facility]) --    Blood Culture (site 1) [[id number]]     Order Status: Canceled Specimen: Blood     Blood Culture (site 2) [[id number]]     Order Status: Canceled Specimen: Blood     Stool Culture [[id number]]     Order Status: Sent Specimen: Stool              Detail Level: Simple Initiate Treatment: SPF 30+ and sun protective clothing discussed.

## 2024-12-07 ENCOUNTER — HOSPITAL ENCOUNTER (EMERGENCY)
Facility: HOSPITAL | Age: 29
Discharge: HOME | End: 2024-12-07
Attending: INTERNAL MEDICINE
Payer: MEDICAID

## 2024-12-07 VITALS
OXYGEN SATURATION: 99 % | HEIGHT: 70 IN | HEART RATE: 86 BPM | TEMPERATURE: 99 F | RESPIRATION RATE: 18 BRPM | SYSTOLIC BLOOD PRESSURE: 128 MMHG | BODY MASS INDEX: 19.26 KG/M2 | DIASTOLIC BLOOD PRESSURE: 76 MMHG

## 2024-12-07 DIAGNOSIS — R59.0 INGUINAL LYMPHADENOPATHY: Primary | ICD-10-CM

## 2024-12-07 LAB
ALBUMIN SERPL-MCNC: 2.8 G/DL (ref 3.5–5)
ALBUMIN/GLOB SERPL: 0.6 RATIO (ref 1.1–2)
ALP SERPL-CCNC: 73 UNIT/L (ref 40–150)
ALT SERPL-CCNC: 8 UNIT/L (ref 0–55)
ANION GAP SERPL CALC-SCNC: 8 MEQ/L
AST SERPL-CCNC: 14 UNIT/L (ref 5–34)
BASOPHILS # BLD AUTO: 0.04 X10(3)/MCL
BASOPHILS NFR BLD AUTO: 0.5 %
BILIRUB SERPL-MCNC: 0.2 MG/DL
BUN SERPL-MCNC: 5.6 MG/DL (ref 8.9–20.6)
CALCIUM SERPL-MCNC: 8.5 MG/DL (ref 8.4–10.2)
CHLORIDE SERPL-SCNC: 104 MMOL/L (ref 98–107)
CO2 SERPL-SCNC: 24 MMOL/L (ref 22–29)
CREAT SERPL-MCNC: 0.8 MG/DL (ref 0.72–1.25)
CREAT/UREA NIT SERPL: 7
EOSINOPHIL # BLD AUTO: 0.46 X10(3)/MCL (ref 0–0.9)
EOSINOPHIL NFR BLD AUTO: 5.8 %
ERYTHROCYTE [DISTWIDTH] IN BLOOD BY AUTOMATED COUNT: 12.8 % (ref 11.5–17)
GFR SERPLBLD CREATININE-BSD FMLA CKD-EPI: >60 ML/MIN/1.73/M2
GLOBULIN SER-MCNC: 5 GM/DL (ref 2.4–3.5)
GLUCOSE SERPL-MCNC: 106 MG/DL (ref 74–100)
HCT VFR BLD AUTO: 36.5 % (ref 42–52)
HGB BLD-MCNC: 12.2 G/DL (ref 14–18)
HOLD SPECIMEN: NORMAL
HOLD SPECIMEN: NORMAL
IMM GRANULOCYTES # BLD AUTO: 0.01 X10(3)/MCL (ref 0–0.04)
IMM GRANULOCYTES NFR BLD AUTO: 0.1 %
LYMPHOCYTES # BLD AUTO: 2.6 X10(3)/MCL (ref 0.6–4.6)
LYMPHOCYTES NFR BLD AUTO: 32.5 %
MCH RBC QN AUTO: 31.9 PG (ref 27–31)
MCHC RBC AUTO-ENTMCNC: 33.4 G/DL (ref 33–36)
MCV RBC AUTO: 95.5 FL (ref 80–94)
MONOCYTES # BLD AUTO: 0.96 X10(3)/MCL (ref 0.1–1.3)
MONOCYTES NFR BLD AUTO: 12 %
NEUTROPHILS # BLD AUTO: 3.92 X10(3)/MCL (ref 2.1–9.2)
NEUTROPHILS NFR BLD AUTO: 49.1 %
NRBC BLD AUTO-RTO: 0 %
PLATELET # BLD AUTO: 445 X10(3)/MCL (ref 130–400)
PMV BLD AUTO: 9 FL (ref 7.4–10.4)
POTASSIUM SERPL-SCNC: 3.7 MMOL/L (ref 3.5–5.1)
PROT SERPL-MCNC: 7.8 GM/DL (ref 6.4–8.3)
RBC # BLD AUTO: 3.82 X10(6)/MCL (ref 4.7–6.1)
SODIUM SERPL-SCNC: 136 MMOL/L (ref 136–145)
WBC # BLD AUTO: 7.99 X10(3)/MCL (ref 4.5–11.5)

## 2024-12-07 PROCEDURE — 25500020 PHARM REV CODE 255

## 2024-12-07 PROCEDURE — 85025 COMPLETE CBC W/AUTO DIFF WBC: CPT | Performed by: NURSE PRACTITIONER

## 2024-12-07 PROCEDURE — 63600175 PHARM REV CODE 636 W HCPCS: Performed by: NURSE PRACTITIONER

## 2024-12-07 PROCEDURE — 96374 THER/PROPH/DIAG INJ IV PUSH: CPT

## 2024-12-07 PROCEDURE — 99285 EMERGENCY DEPT VISIT HI MDM: CPT | Mod: 25

## 2024-12-07 PROCEDURE — 80053 COMPREHEN METABOLIC PANEL: CPT | Performed by: NURSE PRACTITIONER

## 2024-12-07 RX ORDER — KETOROLAC TROMETHAMINE 30 MG/ML
15 INJECTION, SOLUTION INTRAMUSCULAR; INTRAVENOUS
Status: COMPLETED | OUTPATIENT
Start: 2024-12-07 | End: 2024-12-07

## 2024-12-07 RX ADMIN — IOHEXOL 95 ML: 350 INJECTION, SOLUTION INTRAVENOUS at 08:12

## 2024-12-07 RX ADMIN — KETOROLAC TROMETHAMINE 15 MG: 30 INJECTION, SOLUTION INTRAMUSCULAR; INTRAVENOUS at 07:12

## 2024-12-08 NOTE — DISCHARGE INSTRUCTIONS
Follow up with [facility] for biopsy of affected area.  Follow up with your Infectious Disease provider this week for evaluation,  Return to the [facility] ED for evaluation if you develop fever, pain with urination, abdominal pain, or nausea/vomiting.

## 2024-12-08 NOTE — ED PROVIDER NOTES
"Encounter Date: 12/7/2024       History     Chief Complaint   Patient presents with    Hernia     Pt states he noticed a left groin "lump" a few days ago. States he believes he has a hernia. States its does not hurt is just some discomfort rates pain 4/10. Denies any urinary complaints      The patient presents with firm tender mass to left groin that he noticed 2-3 days ago. He reports mild ttp with movement. The course/duration of symptoms is constant. The degree at onset was minimal.  The degree at present is minimal, only with certain movements.  PMHx of HIV, anemia, anxiety.  Associated symptoms: none, denies dysuria, denies scrotal pain, denies chest pain, denies abdominal pain, denies nausea, denies vomiting, denies shortness of breath, denies fever, denies chills, denies headache, denies dizziness and denies back pain.              Review of patient's allergies indicates:   Allergen Reactions    Latex      Other reaction(s): rash    Latex, natural rubber      Past Medical History:   Diagnosis Date    Anemia     Anxiety     HIV infection      History reviewed. No pertinent surgical history.  Family History   Problem Relation Name Age of Onset    No Known Problems Mother      Lung cancer Father      Heart attack Father      No Known Problems Sister       Social History     Tobacco Use    Smoking status: Former     Types: Vaping w/o nicotine    Smokeless tobacco: Never   Substance Use Topics    Alcohol use: Yes     Alcohol/week: 4.0 standard drinks of alcohol     Types: 2 Glasses of wine, 2 Shots of liquor per week     Comment: social    Drug use: Yes     Types: Marijuana     Comment: social     Review of Systems   Constitutional:  Negative for fever.   HENT:  Negative for sore throat.    Respiratory:  Negative for shortness of breath.    Cardiovascular:  Negative for chest pain.   Gastrointestinal:  Negative for nausea.   Genitourinary:  Negative for dysuria.   Musculoskeletal:  Negative for back pain.   Skin: "  Negative for rash.   Neurological:  Negative for weakness.   Hematological:  Does not bruise/bleed easily.   All other systems reviewed and are negative.      Physical Exam     Initial Vitals [12/07/24 1929]   BP Pulse Resp Temp SpO2   135/85 102 20 98.9 °F (37.2 °C) 100 %      MAP       --         Physical Exam    Nursing note and vitals reviewed.  Constitutional: He appears well-developed and well-nourished.   HENT:   Head: Normocephalic and atraumatic.   Neck: Neck supple.   Normal range of motion.  Cardiovascular:  Normal rate, regular rhythm, normal heart sounds and intact distal pulses.           Pulmonary/Chest: Effort normal and breath sounds normal. He has no decreased breath sounds.   Abdominal: Abdomen is soft and flat. Bowel sounds are normal. There is no abdominal tenderness.   Musculoskeletal:         General: Normal range of motion.      Cervical back: Normal range of motion and neck supple.      Comments: Firm mildly tender mass to left groin 3-4cm in diameter     Neurological: He is alert and oriented to person, place, and time. He has normal strength.   Skin: Skin is warm and dry.   Psychiatric: He has a normal mood and affect.         ED Course   Procedures  Labs Reviewed   COMPREHENSIVE METABOLIC PANEL - Abnormal       Result Value    Sodium 136      Potassium 3.7      Chloride 104      CO2 24      Glucose 106 (*)     Blood Urea Nitrogen 5.6 (*)     Creatinine 0.80      Calcium 8.5      Protein Total 7.8      Albumin 2.8 (*)     Globulin 5.0 (*)     Albumin/Globulin Ratio 0.6 (*)     Bilirubin Total 0.2      ALP 73      ALT 8      AST 14      eGFR >60      Anion Gap 8.0      BUN/Creatinine Ratio 7     CBC WITH DIFFERENTIAL - Abnormal    WBC 7.99      RBC 3.82 (*)     Hgb 12.2 (*)     Hct 36.5 (*)     MCV 95.5 (*)     MCH 31.9 (*)     MCHC 33.4      RDW 12.8      Platelet 445 (*)     MPV 9.0      Neut % 49.1      Lymph % 32.5      Mono % 12.0      Eos % 5.8      Basophil % 0.5      Lymph # 2.60       Neut # 3.92      Mono # 0.96      Eos # 0.46      Baso # 0.04      IG# 0.01      IG% 0.1      NRBC% 0.0     CBC W/ AUTO DIFFERENTIAL    Narrative:     The following orders were created for panel order CBC auto differential.  Procedure                               Abnormality         Status                     ---------                               -----------         ------                     CBC with Differential[[id number]]       Abnormal            Final result                 Please view results for these tests on the individual orders.   EXTRA TUBES    Narrative:     The following orders were created for panel order EXTRA TUBES.  Procedure                               Abnormality         Status                     ---------                               -----------         ------                     Light Blue Top Hold[[id number]]                             Final result               Gold Top Hold[[id number]]                                   Final result                 Please view results for these tests on the individual orders.   LIGHT BLUE TOP HOLD    Extra Tube Hold for add-ons.     GOLD TOP HOLD    Extra Tube Hold for add-ons.            Imaging Results              CT Abdomen Pelvis With IV Contrast NO Oral Contrast (Final result)  Result time 12/07/24 21:11:55      Final result by Tom Quezada MD (12/07/24 21:11:55)                   Impression:      1. Left inguinal large mass is favored represent and enlarged lymph node.  Additional less enlarged bilateral inguinal lymph nodes.  Please correlate clinically.    2. Details of other findings above.      Electronically signed by: Tom Quezada  Date:    12/07/2024  Time:    21:11               Narrative:    EXAMINATION:  CT ABDOMEN PELVIS WITH IV CONTRAST    CLINICAL HISTORY:  r/u strangulated left inguinal hernia;    TECHNIQUE:  Multidetector axial images were obtained of the abdomen and pelvis following the administration of IV contrast.  Oral contrast was not administered.    Dose length product of 146 mGycm. Automated exposure control was utilized to minimize radiation dose.    COMPARISON:  January 18, 2023.    FINDINGS:  Included portion of the lungs are without suspicious nodularity, acute air space infiltrates or fluid within the pleural spaces.    Hepatic volume and attenuation is unremarkable. Gallbladder wall is not thickened and there is no intra luminal calcified calculus. No biliary dilation identified. Pancreatic unremarkable attenuation without acute peripancreatic phlegmons. Main pancreatic duct is not dilated. Spleen is of normal size without focal lesion.    The adrenal glands appear within normal limits. The kidneys are unremarkable in size and contour. No solid or cystic renal lesion identified. There is no hydronephrosis. No perinephric fluid strandings or collections identified.    Stomach is filled with residual ingested meal.  No abnormal dilatation of loops of small bowel no apparent focal or generalized mural thickening.  Appendix is not visualized without apparent right lower quadrant inflammatory changes.  Colon is filled with fecal content without abnormal dilatation, transition or bowel obstruction no free fluid.    There is left inguinal heterogeneous enhancing large mass measuring 3.0 x 3.5 x 3.6 cm and likely represents enlarged lymph node on image 122 series 2 and image 77 series 8.  There is additional more inferior left inguinal 1.4 cm enlarged lymph node on image 135 series 3.  There also right inguinal up to 1.6 cm enlarged lymph nodes.  No pelvic sidewall enlarged lymph nodes.    Urinary bladder appears within normal limits. There is no pelvic free fluid.    No acute or otherwise osseous abnormality identified.                                       Medications   ketorolac injection 15 mg (15 mg Intravenous Given 12/7/24 1952)   iohexoL (OMNIPAQUE 350) 350 mg iodine/mL injection (95 mLs Intravenous Given 12/7/24  2030)     Medical Decision Making  The patient presents with firm tender mass to left groin that he noticed 2-3 days ago. He reports mild ttp with movement. The course/duration of symptoms is constant. The degree at onset was minimal.  The degree at present is minimal, only with certain movements.  PMHx of HIV, anemia, anxiety.  Associated symptoms: none, denies dysuria, denies scrotal pain, denies chest pain, denies abdominal pain, denies nausea, denies vomiting, denies shortness of breath, denies fever, denies chills, denies headache, denies dizziness and denies back pain.       Care transitioned to RONNA Jasso at 2035.         Amount and/or Complexity of Data Reviewed  Labs: ordered.  Radiology: ordered.    Risk  Prescription drug management.      Additional MDM:   Differential Diagnosis:   At this time differential diagnosis is but not limited to herniated hernia, reducible hernia, mass, lymphadenopathy             ED Course as of 12/07/24 2129   Sat Dec 07, 2024   2035 I, RONNA HOLLINS, have assumed care of pt at this time. I agree with initial physical exam. Pt awaiting results of his diagnostic testing. Denies needs at this time. [JA]   2125 I have evaluated the pt's imaging results and have discussed pt status with Dr. Brenner, ED attending. Physician recommends pt referral to the [facility] for a biopsy of affected area. Discussed plan with pt. Understanding and agreement voiced. Strict return precautions discussed. Pt continues to deny needs at this time and will seek follow up with his Infectious Disease provider in the next 3-5 days. [JA]      ED Course User Index  [JA] Dajuan Jasso Jr., FNP                           Clinical Impression:  Final diagnoses:  [R59.0] Inguinal lymphadenopathy (Primary)          ED Disposition Condition    Discharge Stable          ED Prescriptions    None       Follow-up Information       Follow up With Specialties Details Why Contact Info    [facility]  - Emergency Dept Emergency Medicine In 3 days As needed, If symptoms worsen [address] [address] [phone]    [facility]  Schedule an appointment as soon as possible for a visit in 2 weeks Follow up with [facility] in next 1-2 weeks for evalaution. [address] [address]             Dajuan Jasso Jr., [facility]  12/07/24 [id number]

## 2025-01-13 ENCOUNTER — TELEPHONE (OUTPATIENT)
Dept: INFECTIOUS DISEASES | Facility: CLINIC | Age: 30
End: 2025-01-13
Payer: MEDICAID

## 2025-01-13 DIAGNOSIS — B20 HIV DISEASE: ICD-10-CM

## 2025-01-13 DIAGNOSIS — E55.9 VITAMIN D DEFICIENCY: ICD-10-CM

## 2025-01-13 RX ORDER — CETIRIZINE HYDROCHLORIDE 10 MG/1
10 TABLET ORAL DAILY
Qty: 30 TABLET | Refills: 0 | Status: SHIPPED | OUTPATIENT
Start: 2025-01-13

## 2025-01-13 RX ORDER — BICTEGRAVIR SODIUM, EMTRICITABINE, AND TENOFOVIR ALAFENAMIDE FUMARATE 50; 200; 25 MG/1; MG/1; MG/1
1 TABLET ORAL DAILY
Qty: 30 TABLET | Refills: 0 | Status: SHIPPED | OUTPATIENT
Start: 2025-01-13

## 2025-01-13 RX ORDER — ERGOCALCIFEROL 1.25 MG/1
50000 CAPSULE ORAL
Qty: 4 CAPSULE | Refills: 0 | Status: SHIPPED | OUTPATIENT
Start: 2025-01-13

## 2025-01-13 NOTE — TELEPHONE ENCOUNTER
----- Message from Jacquelin sent at 1/13/2025 10:26 AM CST -----  Regarding: RX Refill  Patient of Vivi,      Patient called into the clinic to request a refill on all of his prescriptions.  Patient stated that he is completely out.      Pt contact# [phone]    Thanks,   10:28 am

## 2025-01-13 NOTE — TELEPHONE ENCOUNTER
Last visit with Vivi Salazar, APRN: 8/22/2024  Last visit in [facility] INFECTIOUS DISEASE: 8/22/2024    Patient's next visit in [facility] INFECTIOUS DISEASE: To Be Scheduled     LL 08/22/2024    Please advise on medication refill

## 2025-01-13 NOTE — TELEPHONE ENCOUNTER
Recd refill request.  30 day supply given. Pt mendoza not have an upcoming appt with Vivi. Please schedule.

## 2025-01-13 NOTE — TELEPHONE ENCOUNTER
Phoned patient. Rx sent to pharmacy electronically per provider, Vivi Salazar NP. Phoned patient. Notified of rx sent to pharmacy. Appt followup scheduled for 02/03/2025 at 10:10a.m. Appreciative of call.

## 2025-03-14 ENCOUNTER — OFFICE VISIT (OUTPATIENT)
Dept: INFECTIOUS DISEASES | Facility: CLINIC | Age: 30
End: 2025-03-14
Payer: MEDICAID

## 2025-03-14 ENCOUNTER — LAB VISIT (OUTPATIENT)
Dept: LAB | Facility: HOSPITAL | Age: 30
End: 2025-03-14
Attending: NURSE PRACTITIONER
Payer: MEDICAID

## 2025-03-14 VITALS
RESPIRATION RATE: 10 BRPM | TEMPERATURE: 98 F | WEIGHT: 127.13 LBS | HEART RATE: 84 BPM | DIASTOLIC BLOOD PRESSURE: 82 MMHG | SYSTOLIC BLOOD PRESSURE: 116 MMHG | BODY MASS INDEX: 18.2 KG/M2 | HEIGHT: 70 IN

## 2025-03-14 DIAGNOSIS — R59.0 LYMPHADENOPATHY, INGUINAL: ICD-10-CM

## 2025-03-14 DIAGNOSIS — Z23 NEED FOR VACCINATION: ICD-10-CM

## 2025-03-14 DIAGNOSIS — B20 HIV DISEASE: ICD-10-CM

## 2025-03-14 DIAGNOSIS — B20 HIV DISEASE: Primary | ICD-10-CM

## 2025-03-14 LAB
ALBUMIN SERPL-MCNC: 3.6 G/DL (ref 3.5–5)
ALBUMIN/GLOB SERPL: 0.5 RATIO (ref 1.1–2)
ALP SERPL-CCNC: 83 UNIT/L (ref 40–150)
ALT SERPL-CCNC: 17 UNIT/L (ref 0–55)
ANION GAP SERPL CALC-SCNC: 6 MEQ/L
AST SERPL-CCNC: 19 UNIT/L (ref 5–34)
BACTERIA #/AREA URNS AUTO: ABNORMAL /HPF
BASOPHILS # BLD AUTO: 0.04 X10(3)/MCL
BASOPHILS NFR BLD AUTO: 0.6 %
BILIRUB SERPL-MCNC: 0.2 MG/DL
BILIRUB UR QL STRIP.AUTO: NEGATIVE
BUN SERPL-MCNC: 10.7 MG/DL (ref 8.9–20.6)
C TRACH RRNA UR QL NAA+PROBE: NOT DETECTED
CALCIUM SERPL-MCNC: 9.2 MG/DL (ref 8.4–10.2)
CHLORIDE SERPL-SCNC: 103 MMOL/L (ref 98–107)
CLARITY UR: CLEAR
CO2 SERPL-SCNC: 27 MMOL/L (ref 22–29)
COLOR UR AUTO: YELLOW
CREAT SERPL-MCNC: 0.98 MG/DL (ref 0.72–1.25)
CREAT/UREA NIT SERPL: 11
EOSINOPHIL # BLD AUTO: 0.2 X10(3)/MCL (ref 0–0.9)
EOSINOPHIL NFR BLD AUTO: 3.2 %
ERYTHROCYTE [DISTWIDTH] IN BLOOD BY AUTOMATED COUNT: 13.9 % (ref 11.5–17)
GFR SERPLBLD CREATININE-BSD FMLA CKD-EPI: >60 ML/MIN/1.73/M2
GLOBULIN SER-MCNC: 6.6 GM/DL (ref 2.4–3.5)
GLUCOSE SERPL-MCNC: 98 MG/DL (ref 74–100)
GLUCOSE UR QL STRIP: NORMAL
HCT VFR BLD AUTO: 39.1 % (ref 42–52)
HGB BLD-MCNC: 13 G/DL (ref 14–18)
HGB UR QL STRIP: ABNORMAL
HYALINE CASTS #/AREA URNS LPF: ABNORMAL /LPF
IMM GRANULOCYTES # BLD AUTO: 0.01 X10(3)/MCL (ref 0–0.04)
IMM GRANULOCYTES NFR BLD AUTO: 0.2 %
INR PPP: 1.2
KETONES UR QL STRIP: NEGATIVE
LEUKOCYTE ESTERASE UR QL STRIP: NEGATIVE
LYMPHOCYTES # BLD AUTO: 1.51 X10(3)/MCL (ref 0.6–4.6)
LYMPHOCYTES NFR BLD AUTO: 23.9 %
MCH RBC QN AUTO: 30.4 PG (ref 27–31)
MCHC RBC AUTO-ENTMCNC: 33.2 G/DL (ref 33–36)
MCV RBC AUTO: 91.6 FL (ref 80–94)
MONOCYTES # BLD AUTO: 0.56 X10(3)/MCL (ref 0.1–1.3)
MONOCYTES NFR BLD AUTO: 8.9 %
MUCOUS THREADS URNS QL MICRO: ABNORMAL /LPF
N GONORRHOEA DNA UR QL NAA+PROBE: NOT DETECTED
NEUTROPHILS # BLD AUTO: 4 X10(3)/MCL (ref 2.1–9.2)
NEUTROPHILS NFR BLD AUTO: 63.2 %
NITRITE UR QL STRIP: NEGATIVE
NRBC BLD AUTO-RTO: 0 %
PH UR STRIP: 6.5 [PH]
PLATELET # BLD AUTO: 409 X10(3)/MCL (ref 130–400)
PMV BLD AUTO: 8.9 FL (ref 7.4–10.4)
POTASSIUM SERPL-SCNC: 3.9 MMOL/L (ref 3.5–5.1)
PROT SERPL-MCNC: 10.2 GM/DL (ref 6.4–8.3)
PROT UR QL STRIP: ABNORMAL
PROTHROMBIN TIME: 15.3 SECONDS (ref 11.4–14)
RBC # BLD AUTO: 4.27 X10(6)/MCL (ref 4.7–6.1)
RBC #/AREA URNS AUTO: ABNORMAL /HPF
SODIUM SERPL-SCNC: 136 MMOL/L (ref 136–145)
SP GR UR STRIP.AUTO: 1.03 (ref 1–1.03)
SQUAMOUS #/AREA URNS LPF: ABNORMAL /HPF
T PALLIDUM AB SER QL: REACTIVE
T VAGINALIS RRNA UR QL NAA+PROBE: NOT DETECTED
UROBILINOGEN UR STRIP-ACNC: NORMAL
WBC # BLD AUTO: 6.32 X10(3)/MCL (ref 4.5–11.5)
WBC #/AREA URNS AUTO: ABNORMAL /HPF

## 2025-03-14 PROCEDURE — 87536 HIV-1 QUANT&REVRSE TRNSCRPJ: CPT

## 2025-03-14 PROCEDURE — 36415 COLL VENOUS BLD VENIPUNCTURE: CPT

## 2025-03-14 PROCEDURE — 86361 T CELL ABSOLUTE COUNT: CPT

## 2025-03-14 PROCEDURE — 85610 PROTHROMBIN TIME: CPT

## 2025-03-14 PROCEDURE — 81001 URINALYSIS AUTO W/SCOPE: CPT | Performed by: NURSE PRACTITIONER

## 2025-03-14 PROCEDURE — 86780 TREPONEMA PALLIDUM: CPT

## 2025-03-14 PROCEDURE — 80053 COMPREHEN METABOLIC PANEL: CPT

## 2025-03-14 PROCEDURE — 99214 OFFICE O/P EST MOD 30 MIN: CPT | Mod: PBBFAC | Performed by: NURSE PRACTITIONER

## 2025-03-14 PROCEDURE — 87661 TRICHOMONAS VAGINALIS AMPLIF: CPT | Performed by: NURSE PRACTITIONER

## 2025-03-14 PROCEDURE — 86592 SYPHILIS TEST NON-TREP QUAL: CPT

## 2025-03-14 PROCEDURE — 85025 COMPLETE CBC W/AUTO DIFF WBC: CPT

## 2025-03-14 RX ORDER — CLOTRIMAZOLE 1 %
CREAM (GRAM) TOPICAL 2 TIMES DAILY
COMMUNITY
Start: 2025-01-18

## 2025-03-14 RX ORDER — BICTEGRAVIR SODIUM, EMTRICITABINE, AND TENOFOVIR ALAFENAMIDE FUMARATE 50; 200; 25 MG/1; MG/1; MG/1
1 TABLET ORAL DAILY
Qty: 90 TABLET | Refills: 1 | Status: SHIPPED | OUTPATIENT
Start: 2025-03-14

## 2025-03-14 NOTE — PROGRESS NOTES
SUBJECTIVE:   Lety Alcantara is a 51 year old female who presents to clinic today for the following health issues:    Medication Followup of Levothyroxine     Taking Medication as prescribed: yes    Side Effects:  None    Medication Helping Symptoms:  yes     She had a thyroid medication adjustment back in .  Her thyroid level at that time was showing that her levels are being over replaced.  Since her levothyroxine dose was adjusted back in , she does think she is feeling better overall.  She does not feel anxious, she is sleeping well at night and she is feeling great.  Her weight is stable.  She denies any other acute needs today.      Problem list and histories reviewed & adjusted, as indicated.  Additional history: as documented    Patient Active Problem List   Diagnosis     Mood swings (H)     Seizures (H)     Hypothyroidism due to Hashimoto's thyroiditis     IUD (intrauterine device) in place, Paragard     Past Surgical History:   Procedure Laterality Date      SECTION  01       Social History   Substance Use Topics     Smoking status: Never Smoker     Smokeless tobacco: Never Used     Alcohol use No     Family History   Problem Relation Age of Onset     Diabetes Brother      Coronary Artery Disease Father      Hypertension Father      Hyperlipidemia Father      Depression Father      Hypertension Mother      Hyperlipidemia Mother      Breast Cancer Mother      Other Cancer Mother      Depression Sister      Anxiety Disorder Sister      Depression Daughter      Anxiety Disorder Daughter      Asthma Daughter      Thyroid Disease Sister          Current Outpatient Prescriptions   Medication Sig Dispense Refill     levothyroxine (SYNTHROID/LEVOTHROID) 112 MCG tablet Take 1 tablet (112 mcg) by mouth daily 90 tablet 0     sertraline (ZOLOFT) 50 MG tablet Take 1 tablet (50 mg) by mouth daily 90 tablet 1     Allergies   Allergen Reactions     Molds & Smuts Anaphylaxis     Seafood  Heplisav-B and Menveo Vaccinations given IM left deltoid using aseptic tech. Patient tolerated well. To contact clinic prn.      "Anaphylaxis     Sulfa Drugs Rash     Recent Labs   Lab Test  06/06/18   0847   LDL  126*   HDL  61   TRIG  60   ALT  33   CR  0.63   GFRESTIMATED  >90   GFRESTBLACK  >90   POTASSIUM  4.2   TSH  0.10*      BP Readings from Last 3 Encounters:   09/05/18 124/68   06/06/18 133/81    Wt Readings from Last 3 Encounters:   09/05/18 98 lb (44.5 kg)   06/06/18 98 lb (44.5 kg)            Labs reviewed in EPIC    Reviewed and updated as needed this visit by clinical staff  Tobacco  Med Hx  Surg Hx  Fam Hx  Soc Hx      Reviewed and updated as needed this visit by Provider         ROS:  Constitutional, HEENT, cardiovascular, pulmonary, gi and gu systems are negative, except as otherwise noted.    OBJECTIVE:     /68 (BP Location: Left arm, Patient Position: Sitting)  Pulse (!) 48  Temp 98.1  F (36.7  C) (Tympanic)  Resp 14  Ht 4' 8.5\" (1.435 m)  Wt 98 lb (44.5 kg)  SpO2 98%  BMI 21.58 kg/m2  Body mass index is 21.58 kg/(m^2).  Constitutional: healthy, alert and no distress  Neck: supple, no adenopathy. Thyroid normal to palpation  Cardiovascular: RRR. No murmurs, clicks gallops or rub  Respiratory: Respirations easy and regular. No respiratory distress noted. Lung sounds clear to auscultation.  Neurologic: Gait normal. Reflexes normal and symmetric. Sensation grossly WNL.  Psychiatric: mentation appears normal and affect normal/bright    Diagnostics:  Labs pending    ASSESSMENT/PLAN:     (E03.8,  E06.3) Hypothyroidism due to Hashimoto's thyroiditis  Comment:   Plan: levothyroxine (SYNTHROID/LEVOTHROID) 112 MCG         tablet, TSH with free T4 reflex        I anticipate that her current dose of levothyroxine will be perfect for her.  I did tell her that in the event today's TSH is abnormal, we will do a dose adjustment I will let her know.  Otherwise of today's labs are normal, next TSH level in 1 year.  She verbalizes understanding.        ANAYELI Cruz [facility]  "

## 2025-03-14 NOTE — PROGRESS NOTES
Patient ID: Hanh Hassan 29 y.o.     Chief Complaint:   Chief Complaint   Patient presents with    Followup HIV     Denies problems        HPI:    3/14/25  Hanh is a 28 yo AAM presenting today for HIV f/u visit.  He is virally suppressed on Biktarvy and is tolerating it well. Labs 8/24 VL 50, CD4 398.  Will discontinue Bactrim DS with immune restoration.  He is taking vitamin D weekly, tolerates well. He was seen in ED 12/24 with left groin LAD.  He tells me that he went to [facility] and was diagnosed with UTI at the same time. He reports that the swelling went down almost immediately with antimicrobial treatment and he has been feeling better since this time. Did not go to surgery clinic as referred by ED for evaluation of same. Last sexual encounter 1 month ago, insertive anal intercourse. Denies any oral intercourse. Due for 2nd dose of Menveo & Heplisav B #1 today, amenable to both.    8/22/24  Hanh is a 28 yo AAM here today for HIV f/u visit.  He is taking Biktarvy every day and is tolerating it well. Labs 5/24 , Cd4 419, RPR titer 4 dils. He states that he is doing much better with Biktarvy, no longer missing doses & is taking Bactrim DS as well. He tells me that he has not been sexually active, no longer interested in hanging out with the old crowd as he is not interested in partaking of those things. He is taking weekly vitamin d as prescribed. Declines need for repeat STI screenings today. Ready to start receiving recommended vaccinations. Will start with PCV 20 & Menveo #1 today. Heplisav-B & TDAP to follow.  All questions answered & concerns addressed.     5/21/24  Hanh is a 27 yo AAM presenting today for HIV f/u visit.  He takes Biktarvy daily and tolerates it well. Last visit & labs 8/23 VL 43, CD4 199, RPR titer 4 dils. He tells me that he has been taking Biktarvy consistently with only rare missed dose despite missed appts. He missed last appointment due to friend admitted for suicidal  ideation & needing to watch the house. He is still taking Bactrim DS daily as well. Last sexual encounter a few weeks ago & did not have latex free condom so did engage in condomless sex. Will collect oral swab for ct/gc, self-collect anal swab for ct/gc, and assess urine as well. History of syphilis, will reassess titer today.  He does have some skin discoloration patches, states that he thinks it is from mosquito bites and does itch. Provider concerned for secondary syphilis. Denies any respiratory complaints. He is still taking vitamin d replacement as ordered, will check level today and continue same. All questions answered & concerns addressed.           Past Medical History:   Diagnosis Date    Anemia     Anxiety     HIV infection         History reviewed. No pertinent surgical history.     Social History     Socioeconomic History    Marital status: Single   Tobacco Use    Smoking status: Former     Types: Vaping w/o nicotine    Smokeless tobacco: Never   Substance and Sexual Activity    Alcohol use: Yes     Alcohol/week: 4.0 standard drinks of alcohol     Types: 2 Glasses of wine, 2 Shots of liquor per week     Comment: social    Drug use: Yes     Types: Marijuana     Comment: social    Sexual activity: Yes     Partners: Male, Female     Birth control/protection: Condom, None   Social History Narrative    ** Merged History Encounter **          Social Drivers of Health     Financial Resource Strain: Low Risk  (1/20/2023)    Overall Financial Resource Strain (CARDIA)     Difficulty of Paying Living Expenses: Not hard at all   Food Insecurity: No Food Insecurity (1/20/2023)    Hunger Vital Sign     Worried About Running Out of Food in the Last Year: Never true     Ran Out of Food in the Last Year: Never true   Transportation Needs: No Transportation Needs (1/20/2023)    PRAPARE - Transportation     Lack of Transportation (Medical): No     Lack of Transportation (Non-Medical): No   Physical Activity: Inactive  (1/20/2023)    Exercise Vital Sign     Days of Exercise per Week: 0 days     Minutes of Exercise per Session: 0 min   Stress: No Stress Concern Present (1/20/2023)    [employer] [place] [employer] - Occupational Stress Questionnaire     Feeling of Stress : Not at all   Housing Stability: Low Risk  (1/20/2023)    Housing Stability Vital Sign     Unable to Pay for Housing in the Last Year: No     Number of Places Lived in the Last Year: 1     Unstable Housing in the Last Year: No        Family History   Problem Relation Name Age of Onset    No Known Problems Mother      Lung cancer Father      Heart attack Father      No Known Problems Sister          Review of patient's allergies indicates:   Allergen Reactions    Latex      Other reaction(s): rash    Latex, natural rubber         Immunization History   Administered Date(s) Administered    COVID-19, MRNA, LN-S, PF (Pfizer) (Purple Cap) 06/23/2021, 07/14/2021    DTP 1995, 1995, 1995, 08/30/1996    DTaP 08/08/2000    HPV Quadrivalent 08/01/2012, 10/18/2012, 01/30/2013    Hep B / HiB 1995, 1995    Hepatitis B (recombinant) Adjuvanted, 2 dose 03/14/2025    Hepatitis B, Pediatric/Adolescent 1995    HiB PRP-OMP 1995, 08/30/1996    IPV 08/08/2000    Influenza 10/18/2012    Influenza - Trivalent - PF (PED) 10/18/2012    Influenza A (H1N1) 2009 Monovalent - Intranasal 12/05/2009    MMR 08/30/1996, 08/08/2000    MMRV 06/11/2007    Meningococcal Conjugate (MCV4O) 1 Vial Dose(10yr-55yr) 08/22/2024, 03/14/2025    Meningococcal Conjugate (MCV4P) 06/11/2007, 08/01/2012    OPV 1995, 1995, 1995, 08/30/1996    Pneumococcal Conjugate - 20 Valent 08/22/2024    Tdap 06/11/2007    Varicella 08/08/2000        Review of Systems   Constitutional: Negative.    HENT: Negative.     Eyes: Negative.    Respiratory: Negative.     Cardiovascular: Negative.    Gastrointestinal: Negative.    Genitourinary: Negative.   "  Musculoskeletal: Negative.    Skin: Negative.    Neurological: Negative.    Endo/Heme/Allergies: Negative.    Psychiatric/Behavioral: Negative.     All other systems reviewed and are negative.         Objective:      /82 (BP Location: Left arm, Patient Position: Sitting)   Pulse 84   Temp 98.1 °F (36.7 °C) (Oral)   Resp 10   Ht 5' 10" (1.778 m)   Wt 57.7 kg (127 lb 1.6 oz)   BMI 18.24 kg/m²      Physical Exam  Vitals reviewed.   Constitutional:       General: He is not in acute distress.     Appearance: Normal appearance. He is not toxic-appearing.   HENT:      Mouth/Throat:      Mouth: Mucous membranes are moist.      Pharynx: Oropharynx is clear.   Eyes:      Conjunctiva/sclera: Conjunctivae normal.   Cardiovascular:      Rate and Rhythm: Normal rate and regular rhythm.   Pulmonary:      Effort: Pulmonary effort is normal. No respiratory distress.      Breath sounds: Normal breath sounds.   Abdominal:      General: Abdomen is flat. Bowel sounds are normal.      Palpations: Abdomen is soft.   Musculoskeletal:         General: Normal range of motion.      Cervical back: Normal range of motion.   Lymphadenopathy:      Cervical: Cervical adenopathy present.      Right cervical: Deep cervical adenopathy and posterior cervical adenopathy present.      Left cervical: Deep cervical adenopathy and posterior cervical adenopathy present.      Lower Body: Right inguinal adenopathy present. Left inguinal adenopathy present.      Comments: Inguinal lymph nodes palpable, firm & fixed, approximately 1.5 cm round.   Skin:     General: Skin is warm and dry.   Neurological:      General: No focal deficit present.      Mental Status: He is alert and oriented to person, place, and time. Mental status is at baseline.   Psychiatric:         Mood and Affect: Mood normal.         Behavior: Behavior normal.          Labs:   Lab Results   Component Value Date    WBC 6.32 03/14/2025    HGB 13.0 (L) 03/14/2025    HCT 39.1 (L) " 03/14/2025    MCV 91.6 03/14/2025     (H) 03/14/2025       CMP  Sodium   Date Value Ref Range Status   03/14/2025 136 136 - 145 mmol/L Final     Potassium   Date Value Ref Range Status   03/14/2025 3.9 3.5 - 5.1 mmol/L Final     Chloride   Date Value Ref Range Status   03/14/2025 103 98 - 107 mmol/L Final     CO2   Date Value Ref Range Status   03/14/2025 27 22 - 29 mmol/L Final     Blood Urea Nitrogen   Date Value Ref Range Status   03/14/2025 10.7 8.9 - 20.6 mg/dL Final     Creatinine   Date Value Ref Range Status   03/14/2025 0.98 0.72 - 1.25 mg/dL Final     Calcium   Date Value Ref Range Status   03/14/2025 9.2 8.4 - 10.2 mg/dL Final     Albumin   Date Value Ref Range Status   03/14/2025 3.6 3.5 - 5.0 g/dL Final     Bilirubin Total   Date Value Ref Range Status   03/14/2025 0.2 <=1.5 mg/dL Final     ALP   Date Value Ref Range Status   03/14/2025 83 40 - 150 unit/L Final     AST   Date Value Ref Range Status   03/14/2025 19 5 - 34 unit/L Final     ALT   Date Value Ref Range Status   03/14/2025 17 0 - 55 unit/L Final     eGFR   Date Value Ref Range Status   03/14/2025 >60 mL/min/1.73/m2 Final     Comment:     Estimated GFR calculated using the CKD-EPI creatinine (2021) equation.     Lab Results   Component Value Date    TSH 1.480 05/21/2024     Hep C Ab Interp   Date Value Ref Range Status   05/21/2024 Nonreactive Nonreactive Final     Syphilis Antibody   Date Value Ref Range Status   03/14/2025 Reactive (A) Nonreactive, Equivocal Final     RPR   Date Value Ref Range Status   05/21/2024 Reactive (A) Non-Reactive Final     RPR Titer   Date Value Ref Range Status   05/21/2024 4 dils (A) (none) Final     Cholesterol Total   Date Value Ref Range Status   05/21/2024 190 <=200 mg/dL Final     HDL Cholesterol   Date Value Ref Range Status   05/21/2024 62 (H) 35 - 60 mg/dL Final     Triglyceride   Date Value Ref Range Status   05/21/2024 81 34 - 140 mg/dL Final     Cholesterol/HDL Ratio   Date Value Ref Range  Status   05/21/2024 3 0 - 5 Final     Very Low Density Lipoprotein   Date Value Ref Range Status   05/21/2024 16  Final     LDL Cholesterol   Date Value Ref Range Status   05/21/2024 112.00 50.00 - 140.00 mg/dL Final     Vitamin D   Date Value Ref Range Status   05/21/2024 16 (L) 30 - 80 ng/mL Final     Results for orders placed or performed in visit on 08/23/23   CD4 Lymphocytes   Result Value Ref Range    Patient Age 28     WBC Absolute 3,300 (L) 4,500 - 11,500 /mm3    Lymph Percent 34 28 - 48 %    Lymph Absolute 1,122 (L) 1,260 - 5,520 x10(3)/mcL    CD4 % 17.7 %    CD4 Absolute 198.594 (L) 589 - 1,505 unit/L    T Cell Interp       Decreased absolute lymphocyte count with decreased absolute CD4+ lymphocyte count.    Addi Dacosta M.D.     Narrative    This test was developed and its performance characteristics determined by [facility]. It has not been cleared or approved by the US Food and Drug Administration. The FDA does not require this test to go through premarket FDA review. This test is used for clinical purposes. It should not be regarded as investigational or for research. This laboratory is certified under the Clinical Laboratory Improvement Amendments (CLIA) as qualified to perform high complexity clinical laboratory testing.     Results for orders placed or performed in visit on 08/22/24   HIV-1 RNA, Quantitative, PCR with Reflex to Genotype   Result Value Ref Range    HIV-1 RNA Detect/Quant, P 50 (A) Undetected copies/mL     Results for orders placed or performed in visit on 05/21/24   Quantiferon Gold TB   Result Value Ref Range    QuantiFERON-Tb Gold Plus Result Negative Negative    TB1 Ag minus Nil Result 0.01 IU/mL    TB2 Ag minus Nil Result 0.02 IU/mL    Mitogen minus Nil Result >10.00 IU/mL    Nil Result 0.05 IU/mL     Results for orders placed or performed in visit on 05/21/24   C.trach/N.gonor AMP RNA    Specimen: Throat   Result Value Ref Range    Source  THROAT     Chlamydia trachomatis amplified RNA Negative Negative    Neisseria gonorrhoeae amplified RNA Negative Negative    SOURCE: THROAT      Results for orders placed or performed in visit on 05/21/24   Urinalysis   Result Value Ref Range    Color, UA Colorless (A) Yellow, Light-Yellow, Dark Yellow, Brandi, Straw    Appearance, UA Clear Clear    Specific Gravity, UA 1.016 1.005 - 1.030    pH, UA 7.0 5.0 - 8.5    Protein, UA Negative Negative    Glucose, UA Normal Negative, Normal    Ketones, UA Negative Negative    Blood, UA 1+ (A) Negative    Bilirubin, UA Negative Negative    Urobilinogen, UA Normal 0.2, 1.0, Normal    Nitrites, UA Negative Negative    Leukocyte Esterase, UA Negative Negative    WBC, UA 0-5 None Seen, 0-2, 3-5, 0-5 /HPF    Bacteria, UA None Seen None Seen /HPF    Squamous Epithelial Cells, UA None Seen None Seen /HPF    Hyaline Casts, UA None Seen None Seen /lpf    RBC, UA 0-5 None Seen, 0-2, 3-5, 0-5 /HPF       Imaging: Reviewed most recent relevant imaging studies available, notable results highlighted in this note    Medications:     Current Outpatient Medications   Medication Instructions    chlewgfgn-enptgwdi-dqvgqlx ala (BIKTARVY) -25 mg (25 kg or greater) 1 tablet, Oral, Daily    cetirizine (ZYRTEC) 10 mg, Oral, Daily    clotrimazole (LOTRIMIN) 1 % cream 2 times daily    ergocalciferol (ERGOCALCIFEROL) 50,000 Units, Oral, Every 7 days       Assessment:       Problem List Items Addressed This Visit    None  Visit Diagnoses         HIV disease    -  Primary    Relevant Medications    wcfatnsmg-bjoeenfz-tcyriws ala (BIKTARVY) -25 mg (25 kg or greater)    Other Relevant Orders    HIV-1 RNA, Quantitative, PCR with Reflex to Genotype    CD4 Lymphocytes    CBC Auto Differential (Completed)    Comprehensive Metabolic Panel (Completed)      Need for vaccination        Relevant Medications    mening vac A,C,Y,W135 dip (PF) (MENVEO) 10-5 mcg/0.5 mL vaccine (PREFERRED)(10 - 54 YO) 0.5 mL  (Completed)    hepatitis B (HEPLISAV-B) 20 mcg/0.5 mL vaccine 0.5 mL (Completed)      Lymphadenopathy, inguinal        Relevant Orders    Sexually-Transmitted Infections (STIs) Increased Risk Panel    SYPHILIS ANTIBODY (WITH REFLEX RPR) (Completed)    Urinalysis, Reflex to Urine Culture (Completed)    Protime-INR (Completed)    Ambulatory referral/consult to Interventional Radiology               Plan:      HIV disease  -     gihotsicn-oqjbgziz-ixosgoo ala (BIKTARVY) -25 mg (25 kg or greater); Take 1 tablet by mouth once daily.  Dispense: 90 tablet; Refill: 1  -     HIV-1 RNA, Quantitative, PCR with Reflex to Genotype; Future; Expected date: 03/14/2025  -     CD4 Lymphocytes; Future; Expected date: 03/14/2025  -     CBC Auto Differential; Future; Expected date: 03/14/2025  -     Comprehensive Metabolic Panel; Future; Expected date: 03/14/2025  Dx 1/23  CD4 porfirio 8, VL zenith [id number]  SUSAN: PJP 2023     Adherence and sexual health counseling done.  Use condoms for all sexual encounters.  Blood precautions.   Continue Biktarvy 1 po daily as prescribed.  Labs today.  RTC 1 month with Vivi.     HIV Wellness:  Anal pap: N/A  Oral CT/GC: 5/24 Neg  Anal CT/GC: 5/24 Neg  Urine CT/GC: 5/24 Neg  RPR: 12/21 256 dils, 1/23 64 dils, 5/23 16 dils, 8/23 4 dils, 5/24 2 dils  Ophth: 6/19/23    History of Pneumocystis jirovecii pneumonia  OK to discontinue Bactrim DS at this juncture.     Need for vaccination  -     mening vac A,C,Y,W135 dip (PF) (MENVEO) 10-5 mcg/0.5 mL vaccine (PREFERRED)(10 - 54 YO) 0.5 mL  -     hepatitis B (HEPLISAV-B) 20 mcg/0.5 mL vaccine 0.5 mL  Menveo #2 today.   Heplisav-B #1 today, #2 next visit.   TDAP next visit.     Lymphadenopathy, inguinal  -     Sexually-Transmitted Infections (STIs) Increased Risk Panel; Future; Expected date: 03/14/2025  -     SYPHILIS ANTIBODY (WITH REFLEX RPR); Future; Expected date: 03/14/2025  -     Urinalysis, Reflex to Urine Culture  -     Protime-INR; Future; Expected  date: 03/14/2025  -     Ambulatory referral/consult to Interventional Radiology; Future; Expected date: 03/21/2025  Refer to IR for FNA of left inguinal LAD.     History of syphilis  Baseline titer 256 dils 12/21  Bicillin 2.4 mil units IM 12/21 1/23 64 dils  Bicillin 7.2 mil units IM 2/23 5/23 16 dils  8/23 4 dils  5/24 4 dils.   Repeat titer annually & PRN.      Vitamin D deficiency  Continue Vitamin D2 37219 units weekly.

## 2025-03-15 LAB
RPR SER QL: REACTIVE
RPR SER-TITR: ABNORMAL {TITER}

## 2025-03-17 ENCOUNTER — RESULTS FOLLOW-UP (OUTPATIENT)
Dept: INFECTIOUS DISEASES | Facility: CLINIC | Age: 30
End: 2025-03-17

## 2025-03-17 LAB — HIV1 RNA # PLAS NAA DL=20: 46 COPIES/ML

## 2025-03-17 NOTE — PROGRESS NOTES
Lab results reviewed.  RPR titer has increased from 4 dils to 64 dils in past 10 months.  Please phone pt with results. He will need to return for treatment with Bicillin 2.4 mil units IM x 1 dose.  Partner(s) need to seek treatment as well.     Thank you.

## 2025-03-17 NOTE — PROGRESS NOTES
"Phoned patient. No answer. "I'm sorry but your call cannot be completed at this time. Please try your call again later".  "

## 2025-03-18 ENCOUNTER — TELEPHONE (OUTPATIENT)
Dept: INFECTIOUS DISEASES | Facility: CLINIC | Age: 30
End: 2025-03-18
Payer: MEDICAID

## 2025-03-18 ENCOUNTER — CLINICAL SUPPORT (OUTPATIENT)
Dept: INFECTIOUS DISEASES | Facility: CLINIC | Age: 30
End: 2025-03-18
Payer: MEDICAID

## 2025-03-18 DIAGNOSIS — A53.9 SYPHILIS (ACQUIRED): Primary | ICD-10-CM

## 2025-03-18 PROCEDURE — 96372 THER/PROPH/DIAG INJ SC/IM: CPT | Mod: PBBFAC

## 2025-03-18 PROCEDURE — 99211 OFF/OP EST MAY X REQ PHY/QHP: CPT | Mod: PBBFAC

## 2025-03-18 RX ADMIN — PENICILLIN G BENZATHINE 2.4 MILLION UNITS: 1200000 INJECTION, SUSPENSION INTRAMUSCULAR at 01:03

## 2025-03-18 NOTE — TELEPHONE ENCOUNTER
Phoned patient. Informed of RPR titer has increased from 4 dils to 64 dils in past 10 months. He will need to return for treatment with Bicillin 2.4 mil units IM x 1 dose. Partner(s) need to seek treatment as well. Requests to schedule nurse visit today. Appreciative of call.

## 2025-03-19 LAB
AGE: 29
CD3+CD4+ CELLS # SPEC: 366 UNIT/L (ref 589–1505)
CD3+CD4+ CELLS NFR BLD: 24.1 %
LYMPHOCYTES # BLD AUTO: 1516.8 X10(3)/MCL (ref 1260–5520)
LYMPHOCYTES NFR LN MANUAL: 24 % (ref 28–48)
LYMPHOMA - T-CELL MARKERS SPEC-IMP: ABNORMAL
WBC # BLD AUTO: 6320 /MM3 (ref 4500–11500)

## 2025-04-30 ENCOUNTER — OFFICE VISIT (OUTPATIENT)
Dept: INFECTIOUS DISEASES | Facility: CLINIC | Age: 30
End: 2025-04-30
Payer: MEDICAID

## 2025-04-30 VITALS
WEIGHT: 125.31 LBS | RESPIRATION RATE: 10 BRPM | DIASTOLIC BLOOD PRESSURE: 79 MMHG | BODY MASS INDEX: 17.94 KG/M2 | HEART RATE: 90 BPM | SYSTOLIC BLOOD PRESSURE: 124 MMHG | HEIGHT: 70 IN | TEMPERATURE: 98 F

## 2025-04-30 DIAGNOSIS — Z23 NEED FOR VACCINATION: ICD-10-CM

## 2025-04-30 DIAGNOSIS — B20 HIV DISEASE: Primary | ICD-10-CM

## 2025-04-30 DIAGNOSIS — L73.9 FOLLICULITIS: ICD-10-CM

## 2025-04-30 DIAGNOSIS — Z86.19 HISTORY OF SYPHILIS: ICD-10-CM

## 2025-04-30 PROCEDURE — 90471 IMMUNIZATION ADMIN: CPT | Mod: PBBFAC

## 2025-04-30 PROCEDURE — 99214 OFFICE O/P EST MOD 30 MIN: CPT | Mod: PBBFAC | Performed by: NURSE PRACTITIONER

## 2025-04-30 PROCEDURE — 90739 HEPB VACC 2/4 DOSE ADULT IM: CPT | Mod: PBBFAC

## 2025-04-30 PROCEDURE — 1160F RVW MEDS BY RX/DR IN RCRD: CPT | Mod: CPTII,,, | Performed by: NURSE PRACTITIONER

## 2025-04-30 PROCEDURE — 1159F MED LIST DOCD IN RCRD: CPT | Mod: CPTII,,, | Performed by: NURSE PRACTITIONER

## 2025-04-30 PROCEDURE — 3074F SYST BP LT 130 MM HG: CPT | Mod: CPTII,,, | Performed by: NURSE PRACTITIONER

## 2025-04-30 PROCEDURE — 99214 OFFICE O/P EST MOD 30 MIN: CPT | Mod: S$PBB,,, | Performed by: NURSE PRACTITIONER

## 2025-04-30 PROCEDURE — 90715 TDAP VACCINE 7 YRS/> IM: CPT | Mod: PBBFAC

## 2025-04-30 PROCEDURE — 3078F DIAST BP <80 MM HG: CPT | Mod: CPTII,,, | Performed by: NURSE PRACTITIONER

## 2025-04-30 PROCEDURE — 3008F BODY MASS INDEX DOCD: CPT | Mod: CPTII,,, | Performed by: NURSE PRACTITIONER

## 2025-04-30 PROCEDURE — 90472 IMMUNIZATION ADMIN EACH ADD: CPT | Mod: PBBFAC

## 2025-04-30 RX ORDER — MUPIROCIN 20 MG/G
OINTMENT TOPICAL 3 TIMES DAILY
Qty: 15 G | Refills: 1 | Status: SHIPPED | OUTPATIENT
Start: 2025-04-30

## 2025-04-30 RX ORDER — DOXYCYCLINE HYCLATE 100 MG
200 TABLET ORAL ONCE AS NEEDED
Qty: 10 TABLET | Refills: 2 | Status: SHIPPED | OUTPATIENT
Start: 2025-04-30

## 2025-04-30 RX ORDER — SULFAMETHOXAZOLE AND TRIMETHOPRIM 800; 160 MG/1; MG/1
1 TABLET ORAL
COMMUNITY
Start: 2025-04-27 | End: 2025-04-30

## 2025-04-30 RX ADMIN — CLOSTRIDIUM TETANI TOXOID ANTIGEN (FORMALDEHYDE INACTIVATED), CORYNEBACTERIUM DIPHTHERIAE TOXOID ANTIGEN (FORMALDEHYDE INACTIVATED), BORDETELLA PERTUSSIS TOXOID ANTIGEN (GLUTARALDEHYDE INACTIVATED), BORDETELLA PERTUSSIS FILAMENTOUS HEMAGGLUTININ ANTIGEN (FORMALDEHYDE INACTIVATED), BORDETELLA PERTUSSIS PERTACTIN ANTIGEN, AND BORDETELLA PERTUSSIS FIMBRIAE 2/3 ANTIGEN 0.5 ML: 5; 2; 2.5; 5; 3; 5 INJECTION, SUSPENSION INTRAMUSCULAR at 10:04

## 2025-04-30 RX ADMIN — HEPATITIS B VACCINE (RECOMBINANT) ADJUVANTED 0.5 ML: 20 INJECTION, SOLUTION INTRAMUSCULAR at 10:04

## 2025-04-30 NOTE — PROGRESS NOTES
Heplisav-B and TDAP Vaccinations given IM right deltoid using aseptic tech. Patient tolerated well. To contact clinic prn.

## 2025-04-30 NOTE — PROGRESS NOTES
Patient ID: Hnah Hassan 29 y.o.     Chief Complaint:   Chief Complaint   Patient presents with    Followup HIV     Denies problems        HPI:    4/30/25  Hanh is a 30 yo AAM here today for f/u visit.  He completed Bicillin for syphilis re-infection and tells me that the groin swelling has resolved. He is taking Biktary daily, tolerates well. He has discontinued Bactrim DS. Labs 3/14/25 VL 46, CD4 366, RPR 64 dils. Received Bicillin 2.4 mil units on 3/18/25 as ordered. Discussed Doxy for PEP & he expressed interested in having on hand for as needed. He is due for 2nd dose of Heplisav B and TDAP vaccines today, amenable to same. Will plan to repeat labs next visit. He is not currently scheduled for IR LN biopsy, will cancel referral. Today, he tells me that he is considering going to college for something in the medical field. He is very interested in psychology/mental health. Advised patient to look into [facility] for program options and financial assistance that may be available. Voiced appreciation. All questions answered & concerns addressed.    3/14/25  Hanh is a 30 yo AAM presenting today for HIV f/u visit.  He is virally suppressed on Biktarvy and is tolerating it well. Labs 8/24 VL 50, CD4 398.  Will discontinue Bactrim DS with immune restoration.  He is taking vitamin D weekly, tolerates well. He was seen in ED 12/24 with left groin LAD.  He tells me that he went to [facility] and was diagnosed with UTI at the same time. He reports that the swelling went down almost immediately with antimicrobial treatment and he has been feeling better since this time. Did not go to surgery clinic as referred by ED for evaluation of same. Last sexual encounter 1 month ago, insertive anal intercourse. Denies any oral intercourse. Due for 2nd dose of Menveo & Heplisav B #1 today, amenable to both.     8/22/24  Hanh is a 30 yo AAM here today for HIV f/u visit.  He is taking Biktarvy every day and is tolerating it well. Labs  5/24 , Cd4 419, RPR titer 4 dils. He states that he is doing much better with Biktarvy, no longer missing doses & is taking Bactrim DS as well. He tells me that he has not been sexually active, no longer interested in hanging out with the old crowd as he is not interested in partaking of those things. He is taking weekly vitamin d as prescribed. Declines need for repeat STI screenings today. Ready to start receiving recommended vaccinations. Will start with PCV 20 & Menveo #1 today. Heplisav-B & TDAP to follow.  All questions answered & concerns addressed.           Past Medical History:   Diagnosis Date    Anemia     Anxiety     HIV infection         History reviewed. No pertinent surgical history.     Social History     Socioeconomic History    Marital status: Single   Tobacco Use    Smoking status: Former     Types: Vaping w/o nicotine    Smokeless tobacco: Never   Substance and Sexual Activity    Alcohol use: Yes     Alcohol/week: 4.0 standard drinks of alcohol     Types: 2 Glasses of wine, 2 Shots of liquor per week     Comment: social    Drug use: Yes     Types: Marijuana     Comment: social    Sexual activity: Yes     Partners: Male, Female     Birth control/protection: Condom, None   Social History Narrative    ** Merged History Encounter **          Social Drivers of Health     Financial Resource Strain: Low Risk  (1/20/2023)    Overall Financial Resource Strain (CARDIA)     Difficulty of Paying Living Expenses: Not hard at all   Food Insecurity: No Food Insecurity (1/20/2023)    Hunger Vital Sign     Worried About Running Out of Food in the Last Year: Never true     Ran Out of Food in the Last Year: Never true   Transportation Needs: No Transportation Needs (1/20/2023)    PRAPARE - Transportation     Lack of Transportation (Medical): No     Lack of Transportation (Non-Medical): No   Physical Activity: Inactive (1/20/2023)    Exercise Vital Sign     Days of Exercise per Week: 0 days     Minutes of  Exercise per Session: 0 min   Stress: No Stress Concern Present (1/20/2023)    [employer] - Occupational Stress Questionnaire     Feeling of Stress : Not at all   Housing Stability: Low Risk  (1/20/2023)    Housing Stability Vital Sign     Unable to Pay for Housing in the Last Year: No     Number of Places Lived in the Last Year: 1     Unstable Housing in the Last Year: No        Family History   Problem Relation Name Age of Onset    No Known Problems Mother      Lung cancer Father      Heart attack Father      No Known Problems Sister          Review of patient's allergies indicates:   Allergen Reactions    Latex      Other reaction(s): rash    Latex, natural rubber         Immunization History   Administered Date(s) Administered    COVID-19, MRNA, LN-S, PF (Pfizer) (Purple Cap) 06/23/2021, 07/14/2021    DTP 1995, 1995, 1995, 08/30/1996    DTaP 08/08/2000    HPV Quadrivalent 08/01/2012, 10/18/2012, 01/30/2013    Hep B / HiB 1995, 1995    Hepatitis B (recombinant) Adjuvanted, 2 dose 03/14/2025, 04/30/2025    Hepatitis B, Pediatric/Adolescent 1995    HiB PRP-OMP 1995, 08/30/1996    IPV 08/08/2000    Influenza 10/18/2012    Influenza - Trivalent - PF (PED) 10/18/2012    Influenza A (H1N1) 2009 Monovalent - Intranasal 12/05/2009    MMR 08/30/1996, 08/08/2000    MMRV 06/11/2007    Meningococcal Conjugate (MCV4O) 1 Vial Dose(10yr-55yr) 08/22/2024, 03/14/2025    Meningococcal Conjugate (MCV4P) 06/11/2007, 08/01/2012    OPV 1995, 1995, 1995, 08/30/1996    Pneumococcal Conjugate - 20 Valent 08/22/2024    Tdap 06/11/2007, 04/30/2025    Varicella 08/08/2000        Review of Systems   Constitutional: Negative.    HENT: Negative.     Eyes: Negative.    Respiratory: Negative.     Cardiovascular: Negative.    Gastrointestinal: Negative.    Genitourinary: Negative.    Musculoskeletal: Negative.    Skin: Negative.    Neurological: Negative.   "  Endo/Heme/Allergies: Negative.    Psychiatric/Behavioral: Negative.     All other systems reviewed and are negative.         Objective:      /79 (BP Location: Right arm, Patient Position: Sitting)   Pulse 90   Temp 98 °F (36.7 °C) (Oral)   Resp 10   Ht 5' 10" (1.778 m)   Wt 56.8 kg (125 lb 4.8 oz)   BMI 17.98 kg/m²      Physical Exam  Vitals reviewed.   Constitutional:       General: He is not in acute distress.     Appearance: Normal appearance. He is not toxic-appearing.   HENT:      Mouth/Throat:      Mouth: Mucous membranes are moist.      Pharynx: Oropharynx is clear.   Eyes:      Conjunctiva/sclera: Conjunctivae normal.   Cardiovascular:      Rate and Rhythm: Normal rate and regular rhythm.   Pulmonary:      Effort: Pulmonary effort is normal. No respiratory distress.      Breath sounds: Normal breath sounds.   Abdominal:      General: Abdomen is flat. Bowel sounds are normal.      Palpations: Abdomen is soft.   Musculoskeletal:         General: Normal range of motion.      Cervical back: Normal range of motion.   Lymphadenopathy:      Cervical: No cervical adenopathy.      Lower Body: No right inguinal adenopathy. No left inguinal adenopathy.   Skin:     General: Skin is warm and dry.   Neurological:      General: No focal deficit present.      Mental Status: He is alert and oriented to person, place, and time. Mental status is at baseline.   Psychiatric:         Mood and Affect: Mood normal.         Behavior: Behavior normal.          Labs:   Lab Results   Component Value Date    WBC 6.32 03/14/2025    HGB 13.0 (L) 03/14/2025    HCT 39.1 (L) 03/14/2025    MCV 91.6 03/14/2025     (H) 03/14/2025       CMP  Sodium   Date Value Ref Range Status   03/14/2025 136 136 - 145 mmol/L Final     Potassium   Date Value Ref Range Status   03/14/2025 3.9 3.5 - 5.1 mmol/L Final     Chloride   Date Value Ref Range Status   03/14/2025 103 98 - 107 mmol/L Final     CO2   Date Value Ref Range Status "   03/14/2025 27 22 - 29 mmol/L Final     Glucose   Date Value Ref Range Status   03/14/2025 98 74 - 100 mg/dL Final     Blood Urea Nitrogen   Date Value Ref Range Status   03/14/2025 10.7 8.9 - 20.6 mg/dL Final     Creatinine   Date Value Ref Range Status   03/14/2025 0.98 0.72 - 1.25 mg/dL Final     Calcium   Date Value Ref Range Status   03/14/2025 9.2 8.4 - 10.2 mg/dL Final     Protein Total   Date Value Ref Range Status   03/14/2025 10.2 (H) 6.4 - 8.3 gm/dL Final     Albumin   Date Value Ref Range Status   03/14/2025 3.6 3.5 - 5.0 g/dL Final     Bilirubin Total   Date Value Ref Range Status   03/14/2025 0.2 <=1.5 mg/dL Final     ALP   Date Value Ref Range Status   03/14/2025 83 40 - 150 unit/L Final     AST   Date Value Ref Range Status   03/14/2025 19 5 - 34 unit/L Final     ALT   Date Value Ref Range Status   03/14/2025 17 0 - 55 unit/L Final     eGFR   Date Value Ref Range Status   03/14/2025 >60 mL/min/1.73/m2 Final     Comment:     Estimated GFR calculated using the CKD-EPI creatinine (2021) equation.     Lab Results   Component Value Date    TSH 1.480 05/21/2024     Hep C Ab Interp   Date Value Ref Range Status   05/21/2024 Nonreactive Nonreactive Final     Syphilis Antibody   Date Value Ref Range Status   03/14/2025 Reactive (A) Nonreactive, Equivocal Final     RPR   Date Value Ref Range Status   03/14/2025 Reactive (A) Non-Reactive Final     RPR Titer   Date Value Ref Range Status   03/14/2025 64 dils (A) (none) Final     Cholesterol Total   Date Value Ref Range Status   05/21/2024 190 <=200 mg/dL Final     HDL Cholesterol   Date Value Ref Range Status   05/21/2024 62 (H) 35 - 60 mg/dL Final     Triglyceride   Date Value Ref Range Status   05/21/2024 81 34 - 140 mg/dL Final     Cholesterol/HDL Ratio   Date Value Ref Range Status   05/21/2024 3 0 - 5 Final     Very Low Density Lipoprotein   Date Value Ref Range Status   05/21/2024 16  Final     LDL Cholesterol   Date Value Ref Range Status   05/21/2024  112.00 50.00 - 140.00 mg/dL Final     Vitamin D   Date Value Ref Range Status   05/21/2024 16 (L) 30 - 80 ng/mL Final     Results for orders placed or performed in visit on 03/14/25   CD4 Lymphocytes   Result Value Ref Range    Patient Age 29     WBC Absolute 6,320 4,500 - 11,500 /mm3    Lymph Percent 24 (L) 28 - 48 %    Lymph Absolute 1,516.8 1,260 - 5,520 x10(3)/mcL    CD4 % 24.1 %    CD4 Absolute 366 (L) 589 - 1,505 unit/L    T Cell Interp       Normal absolute lymphocyte count with decreased absolute CD4+ lymphocyte count.    Addi Dacosta M.D.    Narrative    This test was developed and its performance characteristics determined by [facility]. It has not been cleared or approved by the US Food and Drug Administration. The FDA does not require this test to go through premarket FDA review. This test is used for clinical purposes. It should not be regarded as investigational or for research. This laboratory is certified under the Clinical Laboratory Improvement Amendments (CLIA) as qualified to perform high complexity clinical laboratory testing.     Results for orders placed or performed in visit on 03/14/25   HIV-1 RNA, Quantitative, PCR with Reflex to Genotype   Result Value Ref Range    HIV-1 RNA Detect/Quant, P 46 (A) Undetected copies/mL     Results for orders placed or performed in visit on 05/21/24   Quantiferon Gold TB   Result Value Ref Range    QuantiFERON-Tb Gold Plus Result Negative Negative    TB1 Ag minus Nil Result 0.01 IU/mL    TB2 Ag minus Nil Result 0.02 IU/mL    Mitogen minus Nil Result >10.00 IU/mL    Nil Result 0.05 IU/mL     Results for orders placed or performed in visit on 05/21/24   C.trach/N.gonor AMP RNA    Specimen: Throat   Result Value Ref Range    Source THROAT     Chlamydia trachomatis amplified RNA Negative Negative    Neisseria gonorrhoeae amplified RNA Negative Negative    SOURCE: THROAT      Results for orders placed or performed in visit on  05/21/24   Urinalysis   Result Value Ref Range    Color, UA Colorless (A) Yellow, Light-Yellow, Dark Yellow, Brandi, Straw    Appearance, UA Clear Clear    Specific Gravity, UA 1.016 1.005 - 1.030    pH, UA 7.0 5.0 - 8.5    Protein, UA Negative Negative    Glucose, UA Normal Negative, Normal    Ketones, UA Negative Negative    Blood, UA 1+ (A) Negative    Bilirubin, UA Negative Negative    Urobilinogen, UA Normal 0.2, 1.0, Normal    Nitrites, UA Negative Negative    Leukocyte Esterase, UA Negative Negative    WBC, UA 0-5 None Seen, 0-2, 3-5, 0-5 /HPF    Bacteria, UA None Seen None Seen /HPF    Squamous Epithelial Cells, UA None Seen None Seen /HPF    Hyaline Casts, UA None Seen None Seen /lpf    RBC, UA 0-5 None Seen, 0-2, 3-5, 0-5 /HPF       Imaging: Reviewed most recent relevant imaging studies available, notable results highlighted in this note    Medications:     Current Outpatient Medications   Medication Instructions    qeadgccnv-linooppb-pekmczv ala (BIKTARVY) -25 mg (25 kg or greater) 1 tablet, Oral, Daily    cetirizine (ZYRTEC) 10 mg, Oral, Daily    clotrimazole (LOTRIMIN) 1 % cream 2 times daily    doxycycline (VIBRA-TABS) 200 mg, Oral, Once as needed    ergocalciferol (ERGOCALCIFEROL) 50,000 Units, Oral, Every 7 days    mupirocin (BACTROBAN) 2 % ointment Topical (Top), 3 times daily       Assessment:       Problem List Items Addressed This Visit    None  Visit Diagnoses         HIV disease    -  Primary      History of syphilis        Relevant Medications    doxycycline (VIBRA-TABS) 100 MG tablet      Need for vaccination        Relevant Medications    hepatitis B (HEPLISAV-B) 20 mcg/0.5 mL vaccine 0.5 mL (Completed)    Tdap vaccine injection 0.5 mL (Completed)      Folliculitis        Relevant Medications    mupirocin (BACTROBAN) 2 % ointment               Plan:      HIV disease  Dx 1/23  CD4 porfirio 8, VL zenith [id number]  SUSAN: PJP 2023     Adherence and sexual health counseling done.  Use condoms for  all sexual encounters.  Blood precautions.   Continue Biktarvy 1 po daily as prescribed.  RTC 2 month with Vivi.     HIV Wellness:  Anal pap: N/A  Oral CT/GC: 5/24 Neg  Anal CT/GC: 5/24 Neg  Urine CT/GC: 5/24 Neg  RPR: 12/21 256 dils, 1/23 64 dils, 5/23 16 dils, 8/23 4 dils, 5/24 2 dils, 3/25 64 dils.  Ophth: 6/19/23    History of syphilis  -     doxycycline (VIBRA-TABS) 100 MG tablet; Take 2 tablets (200 mg total) by mouth 1 (one) time if needed (high risk encounter, take ASAP and within 72 hours).  Dispense: 10 tablet; Refill: 2  Baseline titer 256 dils 12/21  Bicillin 2.4 mil units IM 12/21 1/23 64 dils  Bicillin 7.2 mil units IM 2/23 5/23 16 dils  8/23 4 dils  5/24 4 dils  3/25 64 dils, Bicillin 2.4 mil units IM     Need for vaccination  -     hepatitis B (HEPLISAV-B) 20 mcg/0.5 mL vaccine 0.5 mL  -     Tdap vaccine injection 0.5 mL  Heplisav-B #2 & TDAP today.     Folliculitis  -     mupirocin (BACTROBAN) 2 % ointment; Apply topically 3 (three) times daily.  Dispense: 15 g; Refill: 1  Mupirocin ointment as needed for infected insect bites.

## 2025-06-02 ENCOUNTER — TELEPHONE (OUTPATIENT)
Dept: INFECTIOUS DISEASES | Facility: CLINIC | Age: 30
End: 2025-06-02
Payer: MEDICAID

## 2025-08-29 ENCOUNTER — OFFICE VISIT (OUTPATIENT)
Dept: INFECTIOUS DISEASES | Facility: CLINIC | Age: 30
End: 2025-08-29
Payer: MEDICAID

## 2025-08-29 ENCOUNTER — LAB VISIT (OUTPATIENT)
Dept: LAB | Facility: HOSPITAL | Age: 30
End: 2025-08-29
Attending: NURSE PRACTITIONER
Payer: MEDICAID

## 2025-08-29 VITALS
BODY MASS INDEX: 19.11 KG/M2 | RESPIRATION RATE: 10 BRPM | SYSTOLIC BLOOD PRESSURE: 112 MMHG | HEIGHT: 70 IN | DIASTOLIC BLOOD PRESSURE: 76 MMHG | HEART RATE: 73 BPM | TEMPERATURE: 98 F | WEIGHT: 133.5 LBS

## 2025-08-29 DIAGNOSIS — B20 HIV DISEASE: Primary | ICD-10-CM

## 2025-08-29 DIAGNOSIS — Z86.19 HISTORY OF SYPHILIS: ICD-10-CM

## 2025-08-29 DIAGNOSIS — E55.9 VITAMIN D DEFICIENCY: ICD-10-CM

## 2025-08-29 DIAGNOSIS — Z11.3 ROUTINE SCREENING FOR STI (SEXUALLY TRANSMITTED INFECTION): ICD-10-CM

## 2025-08-29 DIAGNOSIS — B20 HIV DISEASE: ICD-10-CM

## 2025-08-29 LAB
25(OH)D3+25(OH)D2 SERPL-MCNC: 17 NG/ML (ref 30–80)
ALBUMIN SERPL-MCNC: 3.3 G/DL (ref 3.5–5)
ALBUMIN/GLOB SERPL: 0.8 RATIO (ref 1.1–2)
ALP SERPL-CCNC: 102 UNIT/L (ref 40–150)
ALT SERPL-CCNC: 20 UNIT/L (ref 0–55)
ANION GAP SERPL CALC-SCNC: 5 MEQ/L
AST SERPL-CCNC: 14 UNIT/L (ref 11–45)
BACTERIA #/AREA URNS AUTO: ABNORMAL /HPF
BASOPHILS # BLD AUTO: 0.03 X10(3)/MCL
BASOPHILS NFR BLD AUTO: 0.7 %
BILIRUB SERPL-MCNC: 0.2 MG/DL
BILIRUB UR QL STRIP.AUTO: NEGATIVE
BUN SERPL-MCNC: 6.3 MG/DL (ref 8.9–20.6)
C TRACH DNA SPEC QL NAA+PROBE: NOT DETECTED
C TRACH DNA SPEC QL NAA+PROBE: NOT DETECTED
CALCIUM SERPL-MCNC: 8.6 MG/DL (ref 8.4–10.2)
CHLORIDE SERPL-SCNC: 106 MMOL/L (ref 98–107)
CHOLEST SERPL-MCNC: 120 MG/DL
CHOLEST/HDLC SERPL: 3 {RATIO} (ref 0–5)
CLARITY UR: CLEAR
CO2 SERPL-SCNC: 27 MMOL/L (ref 22–29)
COLOR UR AUTO: ABNORMAL
CREAT SERPL-MCNC: 1.01 MG/DL (ref 0.72–1.25)
CREAT/UREA NIT SERPL: 6
EOSINOPHIL # BLD AUTO: 0.05 X10(3)/MCL (ref 0–0.9)
EOSINOPHIL NFR BLD AUTO: 1.2 %
ERYTHROCYTE [DISTWIDTH] IN BLOOD BY AUTOMATED COUNT: 13.3 % (ref 11.5–17)
EST. AVERAGE GLUCOSE BLD GHB EST-MCNC: 102.5 MG/DL
GFR SERPLBLD CREATININE-BSD FMLA CKD-EPI: >60 ML/MIN/1.73/M2
GLOBULIN SER-MCNC: 4.4 GM/DL (ref 2.4–3.5)
GLUCOSE SERPL-MCNC: 66 MG/DL (ref 74–100)
GLUCOSE UR QL STRIP: NORMAL
HAV AB SER QL IA: REACTIVE
HBA1C MFR BLD: 5.2 %
HBV SURFACE AB SER-ACNC: 68.84 MIU/ML
HBV SURFACE AB SERPL IA-ACNC: REACTIVE M[IU]/ML
HCT VFR BLD AUTO: 42.9 % (ref 42–52)
HCV AB SERPL QL IA: NONREACTIVE
HDLC SERPL-MCNC: 44 MG/DL (ref 35–60)
HGB BLD-MCNC: 14.3 G/DL (ref 14–18)
HGB UR QL STRIP: ABNORMAL
HYALINE CASTS #/AREA URNS LPF: ABNORMAL /LPF
IMM GRANULOCYTES # BLD AUTO: 0 X10(3)/MCL (ref 0–0.04)
IMM GRANULOCYTES NFR BLD AUTO: 0 %
KETONES UR QL STRIP: NEGATIVE
LDLC SERPL CALC-MCNC: 57 MG/DL (ref 50–140)
LEUKOCYTE ESTERASE UR QL STRIP: NEGATIVE
LYMPHOCYTES # BLD AUTO: 1.94 X10(3)/MCL (ref 0.6–4.6)
LYMPHOCYTES NFR BLD AUTO: 48.4 %
MCH RBC QN AUTO: 32.3 PG (ref 27–31)
MCHC RBC AUTO-ENTMCNC: 33.3 G/DL (ref 33–36)
MCV RBC AUTO: 96.8 FL (ref 80–94)
MONOCYTES # BLD AUTO: 0.61 X10(3)/MCL (ref 0.1–1.3)
MONOCYTES NFR BLD AUTO: 15.2 %
N GONORRHOEA DNA SPEC QL NAA+PROBE: DETECTED
N GONORRHOEA DNA SPEC QL NAA+PROBE: NOT DETECTED
NEUTROPHILS # BLD AUTO: 1.38 X10(3)/MCL (ref 2.1–9.2)
NEUTROPHILS NFR BLD AUTO: 34.5 %
NITRITE UR QL STRIP: NEGATIVE
NRBC BLD AUTO-RTO: 0 %
PH UR STRIP: 6 [PH]
PLATELET # BLD AUTO: 262 X10(3)/MCL (ref 130–400)
PMV BLD AUTO: 9.3 FL (ref 7.4–10.4)
POTASSIUM SERPL-SCNC: 3.9 MMOL/L (ref 3.5–5.1)
PROT SERPL-MCNC: 7.7 GM/DL (ref 6.4–8.3)
PROT UR QL STRIP: NEGATIVE
RBC # BLD AUTO: 4.43 X10(6)/MCL (ref 4.7–6.1)
RBC #/AREA URNS AUTO: ABNORMAL /HPF
RPR SER QL: REACTIVE
RPR SER-TITR: ABNORMAL {TITER}
SODIUM SERPL-SCNC: 138 MMOL/L (ref 136–145)
SP GR UR STRIP.AUTO: 1.01 (ref 1–1.03)
SPECIMEN SOURCE: ABNORMAL
SPECIMEN SOURCE: NORMAL
SQUAMOUS #/AREA URNS LPF: ABNORMAL /HPF
T PALLIDUM AB SER QL: REACTIVE
TRIGL SERPL-MCNC: 95 MG/DL (ref 34–140)
TSH SERPL-ACNC: 1.16 UIU/ML (ref 0.35–4.94)
UROBILINOGEN UR STRIP-ACNC: NORMAL
VLDLC SERPL CALC-MCNC: 19 MG/DL
WBC # BLD AUTO: 4.01 X10(3)/MCL (ref 4.5–11.5)
WBC #/AREA URNS AUTO: ABNORMAL /HPF

## 2025-08-29 PROCEDURE — 80053 COMPREHEN METABOLIC PANEL: CPT

## 2025-08-29 PROCEDURE — 81001 URINALYSIS AUTO W/SCOPE: CPT | Performed by: NURSE PRACTITIONER

## 2025-08-29 PROCEDURE — 36415 COLL VENOUS BLD VENIPUNCTURE: CPT

## 2025-08-29 PROCEDURE — 82306 VITAMIN D 25 HYDROXY: CPT

## 2025-08-29 PROCEDURE — 86480 TB TEST CELL IMMUN MEASURE: CPT

## 2025-08-29 PROCEDURE — 86361 T CELL ABSOLUTE COUNT: CPT

## 2025-08-29 PROCEDURE — 99214 OFFICE O/P EST MOD 30 MIN: CPT | Mod: PBBFAC | Performed by: NURSE PRACTITIONER

## 2025-08-29 PROCEDURE — 86706 HEP B SURFACE ANTIBODY: CPT

## 2025-08-29 PROCEDURE — 86780 TREPONEMA PALLIDUM: CPT

## 2025-08-29 PROCEDURE — 87536 HIV-1 QUANT&REVRSE TRNSCRPJ: CPT

## 2025-08-29 PROCEDURE — 85025 COMPLETE CBC W/AUTO DIFF WBC: CPT

## 2025-08-29 PROCEDURE — 86708 HEPATITIS A ANTIBODY: CPT

## 2025-08-29 PROCEDURE — 84443 ASSAY THYROID STIM HORMONE: CPT

## 2025-08-29 PROCEDURE — 86803 HEPATITIS C AB TEST: CPT

## 2025-08-29 PROCEDURE — 86592 SYPHILIS TEST NON-TREP QUAL: CPT

## 2025-08-29 PROCEDURE — 80061 LIPID PANEL: CPT

## 2025-08-29 PROCEDURE — 87491 CHLMYD TRACH DNA AMP PROBE: CPT | Mod: 91 | Performed by: NURSE PRACTITIONER

## 2025-08-29 PROCEDURE — 83036 HEMOGLOBIN GLYCOSYLATED A1C: CPT

## 2025-08-29 RX ORDER — BICTEGRAVIR SODIUM, EMTRICITABINE, AND TENOFOVIR ALAFENAMIDE FUMARATE 50; 200; 25 MG/1; MG/1; MG/1
1 TABLET ORAL DAILY
Qty: 90 TABLET | Refills: 1 | Status: SHIPPED | OUTPATIENT
Start: 2025-08-29

## 2025-08-30 LAB
HIV1 RNA # SERPL NAA+PROBE: <20 COPIES/ML
HIV1 RNA SERPL NAA+PROBE-LOG#: ABNORMAL {LOG_COPIES}/ML

## 2025-09-02 ENCOUNTER — TELEPHONE (OUTPATIENT)
Dept: INFECTIOUS DISEASES | Facility: CLINIC | Age: 30
End: 2025-09-02
Payer: MEDICAID

## 2025-09-02 LAB
AGE: 30
CD3+CD4+ CELLS # SPEC: 612 UNIT/L (ref 589–1505)
CD3+CD4+ CELLS NFR BLD: 31.8 %
LYMPHOCYTES # BLD AUTO: 1924.8 X10(3)/MCL (ref 1260–5520)
LYMPHOCYTES NFR LN MANUAL: 48 % (ref 28–48)
LYMPHOMA - T-CELL MARKERS SPEC-IMP: ABNORMAL
WBC # BLD AUTO: 4010 /MM3 (ref 4500–11500)

## 2025-09-03 LAB
GAMMA INTERFERON BACKGROUND BLD IA-ACNC: 0.03 IU/ML
M TB IFN-G BLD-IMP: NEGATIVE
M TB IFN-G CD4+ BCKGRND COR BLD-ACNC: 0.01 IU/ML
M TB IFN-G CD4+CD8+ BCKGRND COR BLD-ACNC: 0.02 IU/ML
MITOGEN IGNF BCKGRD COR BLD-ACNC: 9.52 IU/ML